# Patient Record
Sex: FEMALE | Race: ASIAN | NOT HISPANIC OR LATINO | URBAN - METROPOLITAN AREA
[De-identification: names, ages, dates, MRNs, and addresses within clinical notes are randomized per-mention and may not be internally consistent; named-entity substitution may affect disease eponyms.]

---

## 2019-06-01 ENCOUNTER — OUTPATIENT (OUTPATIENT)
Dept: OUTPATIENT SERVICES | Facility: HOSPITAL | Age: 44
LOS: 1 days | End: 2019-06-01
Payer: MEDICAID

## 2019-06-01 PROCEDURE — G9001: CPT

## 2019-06-06 DIAGNOSIS — Z71.89 OTHER SPECIFIED COUNSELING: ICD-10-CM

## 2019-12-26 PROBLEM — Z00.00 ENCOUNTER FOR PREVENTIVE HEALTH EXAMINATION: Status: ACTIVE | Noted: 2019-12-26

## 2020-01-10 ENCOUNTER — APPOINTMENT (OUTPATIENT)
Dept: NEUROLOGY | Facility: CLINIC | Age: 45
End: 2020-01-10
Payer: MEDICAID

## 2020-01-10 VITALS
WEIGHT: 135 LBS | HEART RATE: 75 BPM | BODY MASS INDEX: 23.92 KG/M2 | SYSTOLIC BLOOD PRESSURE: 100 MMHG | RESPIRATION RATE: 17 BRPM | OXYGEN SATURATION: 98 % | TEMPERATURE: 97.8 F | HEIGHT: 63 IN | DIASTOLIC BLOOD PRESSURE: 56 MMHG

## 2020-01-10 DIAGNOSIS — F20.9 SCHIZOPHRENIA, UNSPECIFIED: ICD-10-CM

## 2020-01-10 DIAGNOSIS — Z78.9 OTHER SPECIFIED HEALTH STATUS: ICD-10-CM

## 2020-01-10 PROCEDURE — 99204 OFFICE O/P NEW MOD 45 MIN: CPT

## 2020-01-10 NOTE — CONSULT LETTER
[Dear  ___] : Dear  [unfilled], [FreeTextEntry1] : \par \par Thank you very much for allowing me to participate in the care of your patient. Please don't hesitate to contact me with any questions or concerns. \par \par Sincerely,\par Marly Cano MD\par Neurology\par [facility]\par [address]\[place] [address]\par Tel: [phone]\par Email: [email or web address] \par

## 2020-01-10 NOTE — HISTORY OF PRESENT ILLNESS
[FreeTextEntry1] : 44 W who has been diagnosed with schizophrenia who had many admissions to psychiatric hospital and now has court mandated treatment. She only speak Mandarin Chinese. She came with a letter from Dr. canada but I was only able to get . Called brother  and parents  and not able to leave . She states she goes outside at night because she wants to go home to her parents. When asked why she is on so many medications, she states she doesn't know. She knows that her parents don't want her to come home right now.

## 2020-01-10 NOTE — PHYSICAL EXAM
[Person] : oriented to person [FreeTextEntry1] : child like behavior [General Appearance - Alert] : alert [Time] : disoriented to time [Place] : disoriented to place [Fluency] : fluency intact [Cranial Nerves Optic (II)] : visual acuity intact bilaterally,  visual fields full to confrontation, pupils equal round and reactive to light [Current Events] : adequate knowledge of current events [Cranial Nerves Trigeminal (V)] : facial sensation intact symmetrically [Cranial Nerves Oculomotor (III)] : extraocular motion intact [Cranial Nerves Facial (VII)] : face symmetrical [Cranial Nerves Vestibulocochlear (VIII)] : hearing was intact bilaterally [Cranial Nerves Glossopharyngeal (IX)] : tongue and palate midline [Cranial Nerves Hypoglossal (XII)] : there was no tongue deviation with protrusion [Cranial Nerves Accessory (XI - Cranial And Spinal)] : head turning and shoulder shrug symmetric [Motor Strength] : muscle strength was normal in all four extremities [Motor Tone] : muscle tone was normal in all four extremities [Sensation Tactile Decrease] : light touch was intact [Coordination - Dysmetria Impaired Finger-to-Nose Bilateral] : not present [2+] : Patella left 2+ [Hearing Threshold Finger Rub Not [place]] : hearing was normal [Extraocular Movements] : extraocular movements were intact [Abnormal Walk] : normal gait [] : no rash [Full Pulse] : the pedal pulses are present

## 2020-01-14 ENCOUNTER — EMERGENCY (EMERGENCY)
Facility: HOSPITAL | Age: 45
LOS: 1 days | Discharge: HOME | End: 2020-01-14
Attending: EMERGENCY MEDICINE | Admitting: EMERGENCY MEDICINE
Payer: MEDICAID

## 2020-01-14 VITALS
WEIGHT: 130.07 LBS | DIASTOLIC BLOOD PRESSURE: 78 MMHG | SYSTOLIC BLOOD PRESSURE: 111 MMHG | OXYGEN SATURATION: 95 % | HEIGHT: 64 IN | TEMPERATURE: 98 F | RESPIRATION RATE: 18 BRPM | HEART RATE: 88 BPM

## 2020-01-14 PROCEDURE — 99282 EMERGENCY DEPT VISIT SF MDM: CPT

## 2020-01-14 PROCEDURE — 99283 EMERGENCY DEPT VISIT LOW MDM: CPT

## 2020-01-14 NOTE — ED PROVIDER NOTE - ATTENDING CONTRIBUTION TO CARE
pt was sent from [facility] for evaluation. pt lives at the group home , has h/o schizophrenia, left group home and was out for 2 hrs.  pt denies any complaints. no si/hi/hallucinations. no fall /injury.     exam:   General: well appearing, NAD.   HEENT: eyes perrl, nose normal, OP no erythema/exudate/swelling.   cor: RRR, s1s2, 2+rad pulses.   lungs: ctabl, no resp distress.   abd: soft, ntnd.   neuro: a&ox3, cn2-12 intact, CHIRINOS, 5/5 strength c nl sensation all extremities, nl coordination.   MSK: no extremity swelling.  Skin: normal, no rash  psych: no si/hi/hallucinations.  coherent.    AP: for med eval after pt left group home. no acute psych condition. no signs of injury or trauma. pt can return to group home.
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
## 2020-01-14 NOTE — ED PROVIDER NOTE - PATIENT PORTAL LINK FT
You can access the FollowMyHealth Patient Portal offered by [facility] by registering at the following website: [email or web address]. By joining [facility]’s FollowMyHealth portal, you will also be able to view your health information using other applications (apps) compatible with our system.

## 2020-01-14 NOTE — ED ADULT NURSE NOTE - CHIEF COMPLAINT QUOTE
Pt BIB EMS after eloping from [facility] where she is a resident. Pt denies any complaints, pain, SI/HI.

## 2020-01-14 NOTE — ED ADULT NURSE NOTE - TEMPLATE LIST FOR HEAD TO TOE ASSESSMENT
Consent: The patient's consent was obtained including but not limited to risks of crusting, scabbing, blistering, scarring, darker or lighter pigmentary change, recurrence, incomplete removal and infection. Render Post-Care Instructions In Note?: no Medical Necessity Information: It is in your best interest to select a reason for this procedure from the list below. All of these items fulfill various CMS LCD requirements except the new and changing color options. Post-Care Instructions: I reviewed with the patient in detail post-care instructions. Patient is to wear sunprotection, and avoid picking at any of the treated lesions. Pt may apply Vaseline to crusted or scabbing areas. Pared With?: 15 blade Medical Necessity Clause: This procedure was medically necessary because the lesions that were treated were: Detail Level: Simple General Number Of Freeze-Thaw Cycles: 2 freeze-thaw cycles

## 2020-01-14 NOTE — ED ADULT TRIAGE NOTE - CHIEF COMPLAINT QUOTE
Pt BIB EMS after eloping from [facility] where she is a resident. Pt denies any complaints, pain, SI/HI. No

## 2020-01-14 NOTE — ED PROVIDER NOTE - PHYSICAL EXAMINATION
General:     NAD, well-nourished, well-appearing  Eyes: PERRL  Head:     NC/AT, missing teeth  Neck:     trachea midline  Lungs:     CTA b/l  CVS:     RRR  Abd:     +BS, s/nt/nd  Ext:   no deformities, feet covered in dirt   Neuro: Alert, nonfocal

## 2020-01-20 DIAGNOSIS — Z03.89 ENCOUNTER FOR OBSERVATION FOR OTHER SUSPECTED DISEASES AND CONDITIONS RULED OUT: ICD-10-CM

## 2020-01-24 ENCOUNTER — EMERGENCY (EMERGENCY)
Facility: HOSPITAL | Age: 45
LOS: 1 days | Discharge: HOME | End: 2020-01-24
Attending: EMERGENCY MEDICINE | Admitting: EMERGENCY MEDICINE
Payer: MEDICAID

## 2020-01-24 VITALS
RESPIRATION RATE: 17 BRPM | OXYGEN SATURATION: 96 % | SYSTOLIC BLOOD PRESSURE: 105 MMHG | DIASTOLIC BLOOD PRESSURE: 61 MMHG | WEIGHT: 130.07 LBS | TEMPERATURE: 98 F | HEART RATE: 108 BPM

## 2020-01-24 DIAGNOSIS — F79 UNSPECIFIED INTELLECTUAL DISABILITIES: ICD-10-CM

## 2020-01-24 DIAGNOSIS — F23 BRIEF PSYCHOTIC DISORDER: ICD-10-CM

## 2020-01-24 LAB
ALBUMIN SERPL ELPH-MCNC: 2.9 G/DL — LOW (ref 3.3–5)
ALP SERPL-CCNC: 55 U/L — SIGNIFICANT CHANGE UP (ref 40–120)
ALT FLD-CCNC: 14 U/L — SIGNIFICANT CHANGE UP (ref 10–45)
AMPHET UR-MCNC: NEGATIVE — SIGNIFICANT CHANGE UP
ANION GAP SERPL CALC-SCNC: 8 MMOL/L — SIGNIFICANT CHANGE UP (ref 5–17)
APAP SERPL-MCNC: <1 UG/ML — LOW (ref 10–30)
APPEARANCE UR: CLEAR — SIGNIFICANT CHANGE UP
AST SERPL-CCNC: 12 U/L — SIGNIFICANT CHANGE UP (ref 10–40)
BACTERIA # UR AUTO: ABNORMAL /HPF
BARBITURATES UR SCN-MCNC: NEGATIVE — SIGNIFICANT CHANGE UP
BASOPHILS # BLD AUTO: 0.03 K/UL — SIGNIFICANT CHANGE UP (ref 0–0.2)
BASOPHILS NFR BLD AUTO: 0.3 % — SIGNIFICANT CHANGE UP (ref 0–2)
BENZODIAZ UR-MCNC: NEGATIVE — SIGNIFICANT CHANGE UP
BILIRUB SERPL-MCNC: 0.2 MG/DL — SIGNIFICANT CHANGE UP (ref 0.2–1.2)
BILIRUB UR-MCNC: NEGATIVE — SIGNIFICANT CHANGE UP
BUN SERPL-MCNC: 16 MG/DL — SIGNIFICANT CHANGE UP (ref 7–23)
CALCIUM SERPL-MCNC: 8.4 MG/DL — SIGNIFICANT CHANGE UP (ref 8.4–10.5)
CHLORIDE SERPL-SCNC: 108 MMOL/L — SIGNIFICANT CHANGE UP (ref 96–108)
CO2 SERPL-SCNC: 28 MMOL/L — SIGNIFICANT CHANGE UP (ref 22–31)
COCAINE METAB.OTHER UR-MCNC: NEGATIVE — SIGNIFICANT CHANGE UP
COLOR SPEC: YELLOW — SIGNIFICANT CHANGE UP
COMMENT - URINE: SIGNIFICANT CHANGE UP
CREAT SERPL-MCNC: 0.73 MG/DL — SIGNIFICANT CHANGE UP (ref 0.5–1.3)
DIFF PNL FLD: NEGATIVE — SIGNIFICANT CHANGE UP
EOSINOPHIL # BLD AUTO: 0.07 K/UL — SIGNIFICANT CHANGE UP (ref 0–0.5)
EOSINOPHIL NFR BLD AUTO: 0.8 % — SIGNIFICANT CHANGE UP (ref 0–6)
EPI CELLS # UR: SIGNIFICANT CHANGE UP
ETHANOL SERPL-MCNC: <3 MG/DL — SIGNIFICANT CHANGE UP (ref 0–3)
GLUCOSE SERPL-MCNC: 100 MG/DL — HIGH (ref 70–99)
GLUCOSE UR QL: NEGATIVE — SIGNIFICANT CHANGE UP
HCT VFR BLD CALC: 35.8 % — SIGNIFICANT CHANGE UP (ref 34.5–45)
HGB BLD-MCNC: 11.2 G/DL — LOW (ref 11.5–15.5)
IMM GRANULOCYTES NFR BLD AUTO: 1.4 % — SIGNIFICANT CHANGE UP (ref 0–1.5)
KETONES UR-MCNC: NEGATIVE — SIGNIFICANT CHANGE UP
LEUKOCYTE ESTERASE UR-ACNC: ABNORMAL
LYMPHOCYTES # BLD AUTO: 1.85 K/UL — SIGNIFICANT CHANGE UP (ref 1–3.3)
LYMPHOCYTES # BLD AUTO: 20.5 % — SIGNIFICANT CHANGE UP (ref 13–44)
MCHC RBC-ENTMCNC: 30.9 PG — SIGNIFICANT CHANGE UP (ref 27–34)
MCHC RBC-ENTMCNC: 31.3 GM/DL — LOW (ref 32–36)
MCV RBC AUTO: 98.9 FL — SIGNIFICANT CHANGE UP (ref 80–100)
METHADONE UR-MCNC: NEGATIVE — SIGNIFICANT CHANGE UP
MONOCYTES # BLD AUTO: 1.28 K/UL — HIGH (ref 0–0.9)
MONOCYTES NFR BLD AUTO: 14.2 % — HIGH (ref 2–14)
NEUTROPHILS # BLD AUTO: 5.65 K/UL — SIGNIFICANT CHANGE UP (ref 1.8–7.4)
NEUTROPHILS NFR BLD AUTO: 62.8 % — SIGNIFICANT CHANGE UP (ref 43–77)
NITRITE UR-MCNC: NEGATIVE — SIGNIFICANT CHANGE UP
NRBC # BLD: 0 /100 WBCS — SIGNIFICANT CHANGE UP (ref 0–0)
OPIATES UR-MCNC: NEGATIVE — SIGNIFICANT CHANGE UP
PCP SPEC-MCNC: SIGNIFICANT CHANGE UP
PCP UR-MCNC: NEGATIVE — SIGNIFICANT CHANGE UP
PH UR: 8 — SIGNIFICANT CHANGE UP (ref 5–8)
PLATELET # BLD AUTO: 145 K/UL — LOW (ref 150–400)
POTASSIUM SERPL-MCNC: 4.3 MMOL/L — SIGNIFICANT CHANGE UP (ref 3.5–5.3)
POTASSIUM SERPL-SCNC: 4.3 MMOL/L — SIGNIFICANT CHANGE UP (ref 3.5–5.3)
PROT SERPL-MCNC: 6.4 G/DL — SIGNIFICANT CHANGE UP (ref 6–8.3)
PROT UR-MCNC: NEGATIVE — SIGNIFICANT CHANGE UP
RBC # BLD: 3.62 M/UL — LOW (ref 3.8–5.2)
RBC # FLD: 13.4 % — SIGNIFICANT CHANGE UP (ref 10.3–14.5)
RBC CASTS # UR COMP ASSIST: NEGATIVE /HPF — SIGNIFICANT CHANGE UP (ref 0–4)
SALICYLATES SERPL-MCNC: 0.6 MG/DL — LOW (ref 3–30)
SODIUM SERPL-SCNC: 144 MMOL/L — SIGNIFICANT CHANGE UP (ref 135–145)
SP GR SPEC: 1.01 — SIGNIFICANT CHANGE UP (ref 1.01–1.02)
THC UR QL: NEGATIVE — SIGNIFICANT CHANGE UP
UROBILINOGEN FLD QL: NEGATIVE — SIGNIFICANT CHANGE UP
WBC # BLD: 9.01 K/UL — SIGNIFICANT CHANGE UP (ref 3.8–10.5)
WBC # FLD AUTO: 9.01 K/UL — SIGNIFICANT CHANGE UP (ref 3.8–10.5)
WBC UR QL: SIGNIFICANT CHANGE UP /HPF (ref 0–5)

## 2020-01-24 PROCEDURE — 80053 COMPREHEN METABOLIC PANEL: CPT

## 2020-01-24 PROCEDURE — 36415 COLL VENOUS BLD VENIPUNCTURE: CPT

## 2020-01-24 PROCEDURE — 93005 ELECTROCARDIOGRAM TRACING: CPT

## 2020-01-24 PROCEDURE — 90792 PSYCH DIAG EVAL W/MED SRVCS: CPT | Mod: GT,GC

## 2020-01-24 PROCEDURE — 80164 ASSAY DIPROPYLACETIC ACD TOT: CPT

## 2020-01-24 PROCEDURE — 81025 URINE PREGNANCY TEST: CPT

## 2020-01-24 PROCEDURE — 70450 CT HEAD/BRAIN W/O DYE: CPT | Mod: 26

## 2020-01-24 PROCEDURE — 85027 COMPLETE CBC AUTOMATED: CPT

## 2020-01-24 PROCEDURE — 87086 URINE CULTURE/COLONY COUNT: CPT

## 2020-01-24 PROCEDURE — 99285 EMERGENCY DEPT VISIT HI MDM: CPT

## 2020-01-24 PROCEDURE — 80307 DRUG TEST PRSMV CHEM ANLYZR: CPT

## 2020-01-24 PROCEDURE — 81001 URINALYSIS AUTO W/SCOPE: CPT

## 2020-01-24 PROCEDURE — 70450 CT HEAD/BRAIN W/O DYE: CPT

## 2020-01-24 PROCEDURE — 93010 ELECTROCARDIOGRAM REPORT: CPT

## 2020-01-24 RX ORDER — CEFUROXIME AXETIL 250 MG
500 TABLET ORAL ONCE
Refills: 0 | Status: COMPLETED | OUTPATIENT
Start: 2020-01-24 | End: 2020-01-24

## 2020-01-24 RX ADMIN — Medication 500 MILLIGRAM(S): at 22:54

## 2020-01-24 NOTE — ED BEHAVIORAL HEALTH ASSESSMENT NOTE - NS ED BHA MED ROS RESPIRATORY
How Severe Are They?: mild
Is This A New Presentation, Or A Follow-Up?: Follow Up [facility]
No complaints

## 2020-01-24 NOTE — ED BEHAVIORAL HEALTH ASSESSMENT NOTE - RISK ASSESSMENT
Chronically, patient with elevated suicide risk including psychotic disorder, non compliance with medication, poor social support, and prior possible attempts. Acutely, patient is at low risk of self harm as she denies current suicidal ideation, intent or plan. Low Acute Suicide Risk
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
## 2020-01-24 NOTE — ED BEHAVIORAL HEALTH ASSESSMENT NOTE - OTHER PAST PSYCHIATRIC HISTORY (INCLUDE DETAILS REGARDING ONSET, COURSE OF ILLNESS, INPATIENT/OUTPATIENT TREATMENT)
Hx of multiple prior inpatient admissions and possible hx of prior suicide attempts per collateral. Currently seen by ACT team and living in CR at [facility].

## 2020-01-24 NOTE — ED BEHAVIORAL HEALTH NOTE - BEHAVIORAL HEALTH NOTE
===================  PRE-HOSPITAL COURSE  ===================  SOURCE:  BIB EMS, no runsheet available   DETAILS:   Per RN - EMS reportedly found pt. roaming parking lot behind school nearby her group home/facility     ============  ED COURSE   ============  SOURCE:  Primary RN Nickie   ARRIVAL:  EMS, unaccompanied   BELONGINGS:  clothing/coat   BEHAVIOR: pt. hygiene/grooming fair, calm/cooperative, provided blood and urine willingly, has eaten and drank in ED, flat affect reported   TREATMENT:  no medications or restraints needed   VISITORS:  no visitors observed

## 2020-01-24 NOTE — ED BEHAVIORAL HEALTH ASSESSMENT NOTE - MEDICAL ISSUES AND PLAN (INCLUDE STANDING AND PRN MEDICATION)
Patient started on Ceftin in ED for UTI and given 500mg. Will continue at 250mg BID for 6 days. Can restart patient on omeprazole 20mg for GERD.

## 2020-01-24 NOTE — ED ADULT TRIAGE NOTE - CHIEF COMPLAINT QUOTE
pt presents to ED with EMS. Pt was found in a parking lot behind a school in [place] walking around for 2 hours. The pt is chinese speaking.  was used and as per , the pt is repeating the same thing over and over. pt presents to ED with EMS. Pt was found in a parking lot behind a school in [place] walking around for 2 hours. The pt is mandarin speaking.  was used and as per , the pt is repeating the same thing over and over.

## 2020-01-24 NOTE — ED PROVIDER NOTE - PHYSICAL EXAMINATION
General:     NAD  Eyes: PERRL  Head:     NC/AT, EOMI, oral mucosa moist  Neck:     trachea midline  Lungs:     CTA b/l  CVS:     RRR  Abd:     +BS, s/nt/nd  Ext:   no deformities   Neuro: knows name and . unsure where she is

## 2020-01-24 NOTE — ED BEHAVIORAL HEALTH ASSESSMENT NOTE - OTHER
ACT Team Personnel Sarah Carrion [phone] Police other residents at the CR None "mad" patient is not a native English speaker.

## 2020-01-24 NOTE — ED PROVIDER NOTE - CLINICAL SUMMARY MEDICAL DECISION MAKING FREE TEXT BOX
pt 58 yo f hx schizophrenia from [facility] presents to ED with EMS. Pt was found in a parking lot behind a school in [place] walking around for 2 hours. The pt is mandarin speaking.  was used and as per , the pt is repeating the same thing over and over.  pt came in as noname and recognized by provider. Called pts housing facility and spoke to Ryne who stated pt was supposed to be supervised 24 hours and they were not aware she was missing. [phone]  pt resides at [address] in [place]- now called nadira [facility].   spoke to Sarah from ACT team [phone] and pt is court mandated to take her medications. has been refusing medications, hearing voices, past hx suicidal ideations. pts family is not involved in her care  seroquel 100mg, depakote 1000, invegra 250mg   used mandarin  raleigh [id number]- pt knew name and  but would not speak or answer any more questions  awaiting telepsych evaluation

## 2020-01-24 NOTE — ED ADULT NURSE NOTE - CHIEF COMPLAINT QUOTE
pt presents to ED with EMS. Pt was found in a parking lot behind a school in [place] walking around for 2 hours. The pt is mandarin speaking.  was used and as per , the pt is repeating the same thing over and over.

## 2020-01-24 NOTE — ED BEHAVIORAL HEALTH ASSESSMENT NOTE - ACTIVATING EVENTS/STRESSORS
Current or pending social isolation/Perceived burden on family or others/Inadequate social supports/Non-compliant or not receiving treatment

## 2020-01-24 NOTE — ED PROVIDER NOTE - OBJECTIVE STATEMENT
pt 58 yo f hx schizophrenia from [facility] presents to ED with EMS. Pt was found in a parking lot behind a school in [place] walking around for 2 hours. The pt is mandarin speaking.  was used and as per , the pt is repeating the same thing over and over.  pt came in as noname and recognized by provider. Called pts housing facility and spoke to Ryne who stated pt was supossed to be supervised 24 hours and they were not aware she was missing. [phone] pt 56 yo f hx schizophrenia from [facility] presents to ED with EMS. Pt was found in a parking lot behind a school in [place] walking around for 2 hours. The pt is mandarin speaking.  was used and as per , the pt is repeating the same thing over and over.  pt came in as noname and recognized by provider. Called pts housing facility and spoke to Ryne who stated pt was supposed to be supervised 24 hours and they were not aware she was missing. [phone]  pt resides at [address] [place] [address] in [place]- now called nadira [facility].   spoke to Sarah from ACT team [phone] and pt is court mandated to take her medications. has been refusing medications, hearing voices, past hx suicidal ideations. pts family is not involved in her care  seroquel 100mg, depakote 1000, invegra 250mg   used mandarin  raleigh [id number]- pt knew name and  but would not speak or answer any more questions

## 2020-01-24 NOTE — ED PROVIDER NOTE - PROGRESS NOTE DETAILS
dr Mojica:  pt is medically cleared for inpatient psych.  UA + for UTI. pt otherwise asymptomatic. given ceftin 500mg in ED. recommend ceftin 500mg PO BID for 5 days. pt evaluated by telepsych, who recommends 2PC,psych admission

## 2020-01-24 NOTE — ED BEHAVIORAL HEALTH ASSESSMENT NOTE - VIOLENCE RISK FACTORS:
Noncompliance with treatment/Irritability/Community stressors that increase the risk of destabilization

## 2020-01-24 NOTE — ED BEHAVIORAL HEALTH ASSESSMENT NOTE - HPI (INCLUDE ILLNESS QUALITY, SEVERITY, DURATION, TIMING, CONTEXT, MODIFYING FACTORS, ASSOCIATED SIGNS AND SYMPTOMS)
45 year old Chinese woman, Mandarin speaking only currently domiciled at [facility] with PMH of allergic rhinitis, vitamin D deficiency, GERD per psyches and past psychiatric hx of schizophrenia, non compliant with outpatient care x 1 day, BIB police after being found loitering around local school. On interview and exam, patient is alert and oriented to person only. She does not know the month (answers December, but does know that it is winter) or year (thinks it is 1974) and says that her father brought her into the hospital. She denies issues with sleep or appetite. She notes low energy and is unsure of how her concentration has been. She denies currently suicidal ideation, intent or plan and she denies current homicidal ideation, intent or plan. She notes that she does her voices and when asked what they say to her, she responds in unintelligible language. She denies anxiety or assault. She does not know what meds she takes and she does not know what allergies she has. She denies tobacco, alcohol or drug use but when asked about sexual activity, she refuses to respond.  She says that she has no friends, spends all of her day in the house, doesn't enjoy music or television and doesn't like to have fun. She says that her mood is "mad" for "no reason." Today, she says that she took medication and she is asking for someone to get her brother.     Per Sarah Carrion, ACT team personnel, patient's symptoms have been worsening over the past month with unknown provocation. She has been leaving the house, acting erratically, has not been completing ADLs, and does not know her address which are all changes from baseline. Patient moved to the  during the summer of 2019 after being abandoned by her parents and brothers, who [place] between the [place] and [place]. Per Sarah, patient does have a possible hx of suicide attempts and will have trantrums when she becomes upset. To Sarah's knowledge, patient is not on OCPs, and may have the cognitive level of a young child. Patient has several prior admissions to inpatient psychiatry. She occasionally has followed with a Mandarin speaking psychiatrist at [facility] where she has had prior inpatient admissions. Patient has her own power of  and financially managed by the ACT team, receiving SSI and SSD. Trauma hx is unknown. Per Sarah, patient receives monthly Invega IM (last injection the beginning of January 2020), VPA 1000mg QAM (refused today).    Per chart review, patient presented to same ED one week prior on 1/14/20 after being found loitering outside. At that time, patient was found to be hypothermic and was treated and discharged back to CR.

## 2020-01-24 NOTE — ED BEHAVIORAL HEALTH ASSESSMENT NOTE - CASE SUMMARY
Patient is a 45 year old Chinese woman currently domiciled at a  with a past psychiatric history significant for schizophrenia, currently followed by ACT team, who presents to the ED in a decompensated psychotic state. Based on presentation patient needs inpatient admission for psychiatric stabilization and medication optimization.

## 2020-01-24 NOTE — ED BEHAVIORAL HEALTH ASSESSMENT NOTE - SUMMARY
45 year old Chinese woman, Mandarin speaking only currently domiciled at [facility] with PMH of allergic rhinitis, vitamin D deficiency, GERD per psyches and past psychiatric hx of schizophrenia, non compliant with outpatient care x 1 day, BIB police after being found loitering around local school. Patient with similar presentation to ED 1 week prior and was discharged back to . Per ACT team, patient has progressively deteriorated over the past month and has been non compliant with medication over the past day. Patient with unknown trauma hx, history of possible suicide attempts, and numerous prior admissions. She denies suicidal ideation, intent or plan or homicidal ideation, intent or plan but does endorse AH. On exam, patient with very poor dentition, tachycardia, blunted affected and irritable mood.   leuks on UA and patient started on abx in the ED. Given known psychiatric hx and subacute decline, plan to admit to inpatient for higher level of care.

## 2020-01-24 NOTE — ED BEHAVIORAL HEALTH ASSESSMENT NOTE - DESCRIPTION
Presented to ED and found to have tachycardia on initial vitals. Tolerated EKG, CBC, BMP, CT Brain and was calm and cooperative. Found to have bacteria and moderate leuks on UA and started on 500mg dose of ceftin. CT Brain with no acute concern but found to have advanced cerebral volume loss. Per psyches, patient with GERD, allergic rhinitis, vitamin D deficiency Currently lives in [place]; abandoned by family

## 2020-01-24 NOTE — ED BEHAVIORAL HEALTH ASSESSMENT NOTE - NS ED MD SCRIBE BH ASMENT SECTIONS
MEDICATION/MEDICAL REVIEW OF SYSTEMS/FORMULATION/SUICIDALITY RISK ASSESSMENT/HOMICIDALITY / AGGRESSION/PLAN/TELEPSYCHIATRY/DEMOGRAPHICS/BACKGROUND INFORMATION/HPI/SUBSTANCE USE/REVIEW OF ED CHART/ED COURSE/OTHER PAST PSYCHIATRY HISTORY/PAST MEDICAL HISTORY/FAMILY HISTORY/SOCIAL HISTORY/MENTAL STATUS EXAM/AXIS

## 2020-01-24 NOTE — ED BEHAVIORAL HEALTH ASSESSMENT NOTE - DETAILS
Limited capacity Per collateral, patient with possible prior suicide attempts. Currently, denies suicidal ideation, intent or plan. Plan discussed with ED attending. ED attending to complete legals. Will discuss case with KALIE

## 2020-01-24 NOTE — ED PROVIDER NOTE - ATTENDING CONTRIBUTION TO CARE
Kristopher with [facility]. pt 58 yo f hx schizophrenia from [facility] presents to ED with EMS. Pt was found in a parking lot behind a school in [place] walking around for 2 hours. The pt is mandarin speaking.  was used and as per , the pt is repeating the same thing over and over.  pt came in as noname and recognized by provider. Called pts housing facility and spoke to Ryne who stated pt was supposed to be supervised 24 hours and they were not aware she was missing. [phone]  pt resides at [address] in [place]- now called nadira [facility].   spoke to Sarah from ACT team [phone] and pt is court mandated to take her medications. has been refusing medications, hearing voices, past hx suicidal ideations. pts family is not involved in her care  seroquel 100mg, depakote 1000, invegra 250mg   used mandarin  raleigh [id number]- pt knew name and  but would not speak or answer any more questions  awaiting telepsych evaluation  Patient signed out to incoming physician.  All decisions regarding the progression of care will be made at their discretion.   I performed a face to face bedside interview with patient regarding history of present illness, review of symptoms and past medical history. I completed an independent physical exam.  I have discussed the patient's plan of care with Physician Assistant (PA). I agree with note as stated above, having amended the EMR as needed to reflect my findings.   This includes History of Present Illness, HIV, Past Medical/Surgical/Family/Social History, Allergies and Home Medications, Review of Systems, Physical Exam, and any Progress Notes during the time I functioned as the attending physician for this patient.

## 2020-01-25 ENCOUNTER — INPATIENT (INPATIENT)
Facility: HOSPITAL | Age: 45
LOS: 26 days | Discharge: HOME | End: 2020-02-21
Attending: PSYCHIATRY & NEUROLOGY | Admitting: PSYCHIATRY & NEUROLOGY
Payer: MEDICAID

## 2020-01-25 VITALS — HEIGHT: 60 IN | WEIGHT: 141.1 LBS | TEMPERATURE: 97 F

## 2020-01-25 VITALS
SYSTOLIC BLOOD PRESSURE: 111 MMHG | TEMPERATURE: 98 F | RESPIRATION RATE: 16 BRPM | DIASTOLIC BLOOD PRESSURE: 75 MMHG | OXYGEN SATURATION: 98 % | HEART RATE: 88 BPM

## 2020-01-25 DIAGNOSIS — F23 BRIEF PSYCHOTIC DISORDER: ICD-10-CM

## 2020-01-25 PROBLEM — F25.9 SCHIZOAFFECTIVE DISORDER, UNSPECIFIED: Chronic | Status: ACTIVE | Noted: 2020-01-14

## 2020-01-25 PROCEDURE — 99222 1ST HOSP IP/OBS MODERATE 55: CPT

## 2020-01-25 RX ORDER — DIPHENHYDRAMINE HCL 50 MG
50 CAPSULE ORAL EVERY 6 HOURS
Refills: 0 | Status: DISCONTINUED | OUTPATIENT
Start: 2020-01-25 | End: 2020-02-21

## 2020-01-25 RX ORDER — CEFUROXIME AXETIL 250 MG
500 TABLET ORAL EVERY 12 HOURS
Refills: 0 | Status: DISCONTINUED | OUTPATIENT
Start: 2020-01-25 | End: 2020-01-27

## 2020-01-25 RX ORDER — HALOPERIDOL DECANOATE 100 MG/ML
5 INJECTION INTRAMUSCULAR EVERY 6 HOURS
Refills: 0 | Status: DISCONTINUED | OUTPATIENT
Start: 2020-01-25 | End: 2020-02-21

## 2020-01-25 RX ORDER — DIPHENHYDRAMINE HCL 50 MG
50 CAPSULE ORAL ONCE
Refills: 0 | Status: DISCONTINUED | OUTPATIENT
Start: 2020-01-25 | End: 2020-02-21

## 2020-01-25 RX ORDER — DIVALPROEX SODIUM 500 MG/1
1000 TABLET, DELAYED RELEASE ORAL AT BEDTIME
Refills: 0 | Status: DISCONTINUED | OUTPATIENT
Start: 2020-01-25 | End: 2020-02-21

## 2020-01-25 RX ORDER — HALOPERIDOL DECANOATE 100 MG/ML
5 INJECTION INTRAMUSCULAR ONCE
Refills: 0 | Status: DISCONTINUED | OUTPATIENT
Start: 2020-01-25 | End: 2020-02-21

## 2020-01-25 RX ORDER — PANTOPRAZOLE SODIUM 20 MG/1
40 TABLET, DELAYED RELEASE ORAL
Refills: 0 | Status: DISCONTINUED | OUTPATIENT
Start: 2020-01-25 | End: 2020-02-21

## 2020-01-25 RX ADMIN — HALOPERIDOL DECANOATE 5 MILLIGRAM(S): 100 INJECTION INTRAMUSCULAR at 11:00

## 2020-01-25 RX ADMIN — Medication 50 MILLIGRAM(S): at 11:00

## 2020-01-25 RX ADMIN — Medication 2 MILLIGRAM(S): at 11:00

## 2020-01-25 RX ADMIN — Medication 500 MILLIGRAM(S): at 21:46

## 2020-01-25 RX ADMIN — DIVALPROEX SODIUM 1000 MILLIGRAM(S): 500 TABLET, DELAYED RELEASE ORAL at 21:46

## 2020-01-25 RX ADMIN — PANTOPRAZOLE SODIUM 40 MILLIGRAM(S): 20 TABLET, DELAYED RELEASE ORAL at 08:27

## 2020-01-25 RX ADMIN — Medication 500 MILLIGRAM(S): at 09:05

## 2020-01-26 LAB
CULTURE RESULTS: SIGNIFICANT CHANGE UP
SPECIMEN SOURCE: SIGNIFICANT CHANGE UP
VALPROATE SERPL-MCNC: 81 UG/ML — SIGNIFICANT CHANGE UP (ref 50–100)

## 2020-01-26 PROCEDURE — 99232 SBSQ HOSP IP/OBS MODERATE 35: CPT

## 2020-01-26 RX ADMIN — Medication 500 MILLIGRAM(S): at 20:56

## 2020-01-26 RX ADMIN — Medication 500 MILLIGRAM(S): at 09:09

## 2020-01-26 RX ADMIN — DIVALPROEX SODIUM 1000 MILLIGRAM(S): 500 TABLET, DELAYED RELEASE ORAL at 20:56

## 2020-01-26 RX ADMIN — PANTOPRAZOLE SODIUM 40 MILLIGRAM(S): 20 TABLET, DELAYED RELEASE ORAL at 09:09

## 2020-01-26 NOTE — CHART NOTE - NSCHARTNOTEFT_GEN_A_CORE
Screening Medical Evaluation  Patient Admitted from: [place] ED    [facility] admitting diagnosis: Brief psychotic disorder    PAST MEDICAL & SURGICAL HISTORY:  Schizoaffective disorder        Allergies    No Known Allergies    Intolerances        Social History:     FAMILY HISTORY:      MEDICATIONS  (STANDING):  cefuroxime   Tablet 500 milliGRAM(s) Oral every 12 hours  diVALproex ER 1000 milliGRAM(s) Oral at bedtime  pantoprazole    Tablet 40 milliGRAM(s) Oral before breakfast    MEDICATIONS  (PRN):  diphenhydrAMINE 50 milliGRAM(s) Oral every 6 hours PRN agitation  diphenhydrAMINE   Injectable 50 milliGRAM(s) IntraMuscular once PRN Assaultive behavior  haloperidol     Tablet 5 milliGRAM(s) Oral every 6 hours PRN agitation  haloperidol    Injectable 5 milliGRAM(s) IntraMuscular once PRN agitation  LORazepam     Tablet 2 milliGRAM(s) Oral every 6 hours PRN anxiety/agitation  LORazepam   Injectable 2 milliGRAM(s) IntraMuscular once PRN Agitation      Vital Signs Last 24 Hrs  T(C): 36.9 (25 Jan 2020 19:52), Max: 36.9 (25 Jan 2020 19:52)  T(F): 98.4 (25 Jan 2020 19:52), Max: 98.4 (25 Jan 2020 19:52)  HR: 94  BP: 119/76  BP(mean): --  RR: 18  SpO2: 100  CAPILLARY BLOOD GLUCOSE            PHYSICAL EXAM:  GENERAL: NAD, well-developed  HEAD:  Atraumatic, Normocephalic  EYES: EOMI, PERRLA, conjunctiva and sclera clear  NECK: Supple.  CHEST/LUNG: Clear to auscultation bilaterally; No wheeze  HEART: Regular rate and rhythm; No murmurs, rubs, or gallops  ABDOMEN: Soft, Nontender, Nondistended; Bowel sounds present  EXTREMITIES:  2+ Peripheral Pulses, No cyanosis, or edema  PSYCH: AAOx3  NEUROLOGY: non-focal  SKIN: No rashes or lesions    LABS:                    RADIOLOGY & ADDITIONAL TESTS:    Assessment and Plan:  45 year old female Mandarin speaking presenting today from [place] ED to [facility] with admitting diagnosis of  Brief psychotic disorder with PMH of GERD. +Leuks on UA and patient started on abx in the ED. Continue ceftin 500mg BID. With help of phone  Denies  any fever, chills, headache, pain, chest pain, SOB, abdominal pain, N/V/D/C. Physical exam unremarkable.  1)  Brief psychotic disorder : Follow care plan as per primary team.  2) UTI: Continue ceftin 500mg BID x 5days.  3) GERD: Continue protonix 40mg oral daily.

## 2020-01-27 PROCEDURE — 99232 SBSQ HOSP IP/OBS MODERATE 35: CPT

## 2020-01-27 RX ORDER — CEFUROXIME AXETIL 250 MG
500 TABLET ORAL EVERY 12 HOURS
Refills: 0 | Status: COMPLETED | OUTPATIENT
Start: 2020-01-27 | End: 2020-01-30

## 2020-01-27 RX ORDER — QUETIAPINE FUMARATE 200 MG/1
50 TABLET, FILM COATED ORAL AT BEDTIME
Refills: 0 | Status: DISCONTINUED | OUTPATIENT
Start: 2020-01-27 | End: 2020-01-29

## 2020-01-27 RX ADMIN — PANTOPRAZOLE SODIUM 40 MILLIGRAM(S): 20 TABLET, DELAYED RELEASE ORAL at 09:35

## 2020-01-27 RX ADMIN — QUETIAPINE FUMARATE 50 MILLIGRAM(S): 200 TABLET, FILM COATED ORAL at 20:47

## 2020-01-27 RX ADMIN — Medication 500 MILLIGRAM(S): at 20:47

## 2020-01-27 RX ADMIN — Medication 500 MILLIGRAM(S): at 09:35

## 2020-01-27 RX ADMIN — DIVALPROEX SODIUM 1000 MILLIGRAM(S): 500 TABLET, DELAYED RELEASE ORAL at 20:47

## 2020-01-28 PROCEDURE — 99232 SBSQ HOSP IP/OBS MODERATE 35: CPT

## 2020-01-28 RX ADMIN — Medication 500 MILLIGRAM(S): at 20:32

## 2020-01-28 RX ADMIN — DIVALPROEX SODIUM 1000 MILLIGRAM(S): 500 TABLET, DELAYED RELEASE ORAL at 20:32

## 2020-01-28 RX ADMIN — QUETIAPINE FUMARATE 50 MILLIGRAM(S): 200 TABLET, FILM COATED ORAL at 20:32

## 2020-01-28 RX ADMIN — PANTOPRAZOLE SODIUM 40 MILLIGRAM(S): 20 TABLET, DELAYED RELEASE ORAL at 09:09

## 2020-01-28 RX ADMIN — Medication 500 MILLIGRAM(S): at 09:09

## 2020-01-29 LAB
CHOLEST SERPL-MCNC: 151 MG/DL — SIGNIFICANT CHANGE UP (ref 120–199)
HBA1C BLD-MCNC: 5.2 % — SIGNIFICANT CHANGE UP (ref 4–5.6)
HDLC SERPL-MCNC: 46 MG/DL — SIGNIFICANT CHANGE UP (ref 45–65)
LIPID PNL WITH DIRECT LDL SERPL: 87 MG/DL — SIGNIFICANT CHANGE UP
TRIGL SERPL-MCNC: 87 MG/DL — SIGNIFICANT CHANGE UP (ref 10–149)
TSH SERPL-MCNC: 1.68 UIU/ML — SIGNIFICANT CHANGE UP (ref 0.27–4.2)

## 2020-01-29 PROCEDURE — 99231 SBSQ HOSP IP/OBS SF/LOW 25: CPT

## 2020-01-29 RX ORDER — ARIPIPRAZOLE 15 MG/1
5 TABLET ORAL DAILY
Refills: 0 | Status: DISCONTINUED | OUTPATIENT
Start: 2020-01-29 | End: 2020-01-30

## 2020-01-29 RX ADMIN — Medication 500 MILLIGRAM(S): at 08:04

## 2020-01-29 RX ADMIN — PANTOPRAZOLE SODIUM 40 MILLIGRAM(S): 20 TABLET, DELAYED RELEASE ORAL at 08:04

## 2020-01-29 RX ADMIN — DIVALPROEX SODIUM 1000 MILLIGRAM(S): 500 TABLET, DELAYED RELEASE ORAL at 20:51

## 2020-01-29 RX ADMIN — Medication 500 MILLIGRAM(S): at 20:51

## 2020-01-30 DIAGNOSIS — R41.0 DISORIENTATION, UNSPECIFIED: ICD-10-CM

## 2020-01-30 PROCEDURE — 99231 SBSQ HOSP IP/OBS SF/LOW 25: CPT

## 2020-01-30 RX ORDER — OLANZAPINE 15 MG/1
5 TABLET, FILM COATED ORAL AT BEDTIME
Refills: 0 | Status: DISCONTINUED | OUTPATIENT
Start: 2020-01-30 | End: 2020-02-03

## 2020-01-30 RX ADMIN — DIVALPROEX SODIUM 1000 MILLIGRAM(S): 500 TABLET, DELAYED RELEASE ORAL at 20:38

## 2020-01-30 RX ADMIN — Medication 2 MILLIGRAM(S): at 00:31

## 2020-01-30 RX ADMIN — ARIPIPRAZOLE 5 MILLIGRAM(S): 15 TABLET ORAL at 08:39

## 2020-01-30 RX ADMIN — Medication 500 MILLIGRAM(S): at 08:39

## 2020-01-30 RX ADMIN — OLANZAPINE 5 MILLIGRAM(S): 15 TABLET, FILM COATED ORAL at 20:38

## 2020-01-30 RX ADMIN — Medication 2 MILLIGRAM(S): at 23:22

## 2020-01-30 RX ADMIN — PANTOPRAZOLE SODIUM 40 MILLIGRAM(S): 20 TABLET, DELAYED RELEASE ORAL at 08:39

## 2020-01-31 PROCEDURE — 99232 SBSQ HOSP IP/OBS MODERATE 35: CPT

## 2020-01-31 RX ORDER — CLONAZEPAM 1 MG
0.5 TABLET ORAL AT BEDTIME
Refills: 0 | Status: DISCONTINUED | OUTPATIENT
Start: 2020-01-31 | End: 2020-02-04

## 2020-01-31 RX ADMIN — Medication 0.5 MILLIGRAM(S): at 21:50

## 2020-01-31 RX ADMIN — DIVALPROEX SODIUM 1000 MILLIGRAM(S): 500 TABLET, DELAYED RELEASE ORAL at 21:50

## 2020-01-31 RX ADMIN — Medication 50 MILLIGRAM(S): at 21:50

## 2020-01-31 RX ADMIN — OLANZAPINE 5 MILLIGRAM(S): 15 TABLET, FILM COATED ORAL at 21:50

## 2020-01-31 RX ADMIN — PANTOPRAZOLE SODIUM 40 MILLIGRAM(S): 20 TABLET, DELAYED RELEASE ORAL at 08:51

## 2020-01-31 RX ADMIN — Medication 2 MILLIGRAM(S): at 21:50

## 2020-01-31 RX ADMIN — HALOPERIDOL DECANOATE 5 MILLIGRAM(S): 100 INJECTION INTRAMUSCULAR at 21:50

## 2020-02-01 ENCOUNTER — OUTPATIENT (OUTPATIENT)
Dept: OUTPATIENT SERVICES | Facility: HOSPITAL | Age: 45
LOS: 1 days | End: 2020-02-01

## 2020-02-01 RX ADMIN — PANTOPRAZOLE SODIUM 40 MILLIGRAM(S): 20 TABLET, DELAYED RELEASE ORAL at 09:42

## 2020-02-01 RX ADMIN — OLANZAPINE 5 MILLIGRAM(S): 15 TABLET, FILM COATED ORAL at 20:06

## 2020-02-01 RX ADMIN — Medication 0.5 MILLIGRAM(S): at 20:06

## 2020-02-01 RX ADMIN — DIVALPROEX SODIUM 1000 MILLIGRAM(S): 500 TABLET, DELAYED RELEASE ORAL at 20:06

## 2020-02-02 RX ADMIN — Medication 50 MILLIGRAM(S): at 14:41

## 2020-02-02 RX ADMIN — DIVALPROEX SODIUM 1000 MILLIGRAM(S): 500 TABLET, DELAYED RELEASE ORAL at 20:22

## 2020-02-02 RX ADMIN — HALOPERIDOL DECANOATE 5 MILLIGRAM(S): 100 INJECTION INTRAMUSCULAR at 14:41

## 2020-02-02 RX ADMIN — OLANZAPINE 5 MILLIGRAM(S): 15 TABLET, FILM COATED ORAL at 20:22

## 2020-02-02 RX ADMIN — PANTOPRAZOLE SODIUM 40 MILLIGRAM(S): 20 TABLET, DELAYED RELEASE ORAL at 08:46

## 2020-02-02 RX ADMIN — Medication 0.5 MILLIGRAM(S): at 20:22

## 2020-02-03 PROCEDURE — 99231 SBSQ HOSP IP/OBS SF/LOW 25: CPT

## 2020-02-03 RX ORDER — OLANZAPINE 15 MG/1
10 TABLET, FILM COATED ORAL AT BEDTIME
Refills: 0 | Status: DISCONTINUED | OUTPATIENT
Start: 2020-02-03 | End: 2020-02-07

## 2020-02-03 RX ADMIN — Medication 0.5 MILLIGRAM(S): at 20:40

## 2020-02-03 RX ADMIN — PANTOPRAZOLE SODIUM 40 MILLIGRAM(S): 20 TABLET, DELAYED RELEASE ORAL at 08:51

## 2020-02-03 RX ADMIN — HALOPERIDOL DECANOATE 5 MILLIGRAM(S): 100 INJECTION INTRAMUSCULAR at 13:40

## 2020-02-03 RX ADMIN — DIVALPROEX SODIUM 1000 MILLIGRAM(S): 500 TABLET, DELAYED RELEASE ORAL at 20:40

## 2020-02-03 RX ADMIN — Medication 50 MILLIGRAM(S): at 13:40

## 2020-02-03 RX ADMIN — OLANZAPINE 10 MILLIGRAM(S): 15 TABLET, FILM COATED ORAL at 20:40

## 2020-02-04 PROCEDURE — 99232 SBSQ HOSP IP/OBS MODERATE 35: CPT

## 2020-02-04 RX ORDER — CLONAZEPAM 1 MG
0.5 TABLET ORAL AT BEDTIME
Refills: 0 | Status: DISCONTINUED | OUTPATIENT
Start: 2020-02-04 | End: 2020-02-11

## 2020-02-04 RX ADMIN — DIVALPROEX SODIUM 1000 MILLIGRAM(S): 500 TABLET, DELAYED RELEASE ORAL at 20:42

## 2020-02-04 RX ADMIN — PANTOPRAZOLE SODIUM 40 MILLIGRAM(S): 20 TABLET, DELAYED RELEASE ORAL at 08:04

## 2020-02-04 RX ADMIN — OLANZAPINE 10 MILLIGRAM(S): 15 TABLET, FILM COATED ORAL at 20:42

## 2020-02-04 RX ADMIN — Medication 0.5 MILLIGRAM(S): at 20:04

## 2020-02-05 PROCEDURE — 99232 SBSQ HOSP IP/OBS MODERATE 35: CPT

## 2020-02-05 RX ADMIN — PANTOPRAZOLE SODIUM 40 MILLIGRAM(S): 20 TABLET, DELAYED RELEASE ORAL at 07:38

## 2020-02-05 RX ADMIN — OLANZAPINE 10 MILLIGRAM(S): 15 TABLET, FILM COATED ORAL at 20:27

## 2020-02-05 RX ADMIN — Medication 0.5 MILLIGRAM(S): at 20:00

## 2020-02-05 RX ADMIN — DIVALPROEX SODIUM 1000 MILLIGRAM(S): 500 TABLET, DELAYED RELEASE ORAL at 20:27

## 2020-02-06 LAB — VALPROATE SERPL-MCNC: 75.2 UG/ML — SIGNIFICANT CHANGE UP (ref 50–100)

## 2020-02-06 PROCEDURE — 99232 SBSQ HOSP IP/OBS MODERATE 35: CPT

## 2020-02-06 RX ADMIN — DIVALPROEX SODIUM 1000 MILLIGRAM(S): 500 TABLET, DELAYED RELEASE ORAL at 20:57

## 2020-02-06 RX ADMIN — PANTOPRAZOLE SODIUM 40 MILLIGRAM(S): 20 TABLET, DELAYED RELEASE ORAL at 07:59

## 2020-02-06 RX ADMIN — OLANZAPINE 10 MILLIGRAM(S): 15 TABLET, FILM COATED ORAL at 20:57

## 2020-02-06 RX ADMIN — Medication 0.5 MILLIGRAM(S): at 20:57

## 2020-02-07 DIAGNOSIS — Z71.89 OTHER SPECIFIED COUNSELING: ICD-10-CM

## 2020-02-07 PROCEDURE — 99231 SBSQ HOSP IP/OBS SF/LOW 25: CPT

## 2020-02-07 RX ORDER — OLANZAPINE 15 MG/1
10 TABLET, FILM COATED ORAL AT BEDTIME
Refills: 0 | Status: COMPLETED | OUTPATIENT
Start: 2020-02-07 | End: 2020-02-07

## 2020-02-07 RX ORDER — OLANZAPINE 15 MG/1
12.5 TABLET, FILM COATED ORAL AT BEDTIME
Refills: 0 | Status: DISCONTINUED | OUTPATIENT
Start: 2020-02-08 | End: 2020-02-14

## 2020-02-07 RX ADMIN — PANTOPRAZOLE SODIUM 40 MILLIGRAM(S): 20 TABLET, DELAYED RELEASE ORAL at 09:02

## 2020-02-07 RX ADMIN — DIVALPROEX SODIUM 1000 MILLIGRAM(S): 500 TABLET, DELAYED RELEASE ORAL at 20:28

## 2020-02-07 RX ADMIN — Medication 0.5 MILLIGRAM(S): at 20:28

## 2020-02-07 RX ADMIN — OLANZAPINE 10 MILLIGRAM(S): 15 TABLET, FILM COATED ORAL at 20:28

## 2020-02-08 RX ADMIN — DIVALPROEX SODIUM 1000 MILLIGRAM(S): 500 TABLET, DELAYED RELEASE ORAL at 21:31

## 2020-02-08 RX ADMIN — OLANZAPINE 12.5 MILLIGRAM(S): 15 TABLET, FILM COATED ORAL at 21:31

## 2020-02-08 RX ADMIN — Medication 0.5 MILLIGRAM(S): at 21:31

## 2020-02-09 RX ADMIN — Medication 0.5 MILLIGRAM(S): at 21:10

## 2020-02-09 RX ADMIN — DIVALPROEX SODIUM 1000 MILLIGRAM(S): 500 TABLET, DELAYED RELEASE ORAL at 21:10

## 2020-02-09 RX ADMIN — PANTOPRAZOLE SODIUM 40 MILLIGRAM(S): 20 TABLET, DELAYED RELEASE ORAL at 08:28

## 2020-02-09 RX ADMIN — OLANZAPINE 12.5 MILLIGRAM(S): 15 TABLET, FILM COATED ORAL at 21:10

## 2020-02-10 PROCEDURE — 99232 SBSQ HOSP IP/OBS MODERATE 35: CPT

## 2020-02-10 RX ADMIN — OLANZAPINE 12.5 MILLIGRAM(S): 15 TABLET, FILM COATED ORAL at 20:35

## 2020-02-10 RX ADMIN — Medication 0.5 MILLIGRAM(S): at 20:16

## 2020-02-10 RX ADMIN — DIVALPROEX SODIUM 1000 MILLIGRAM(S): 500 TABLET, DELAYED RELEASE ORAL at 20:35

## 2020-02-10 RX ADMIN — PANTOPRAZOLE SODIUM 40 MILLIGRAM(S): 20 TABLET, DELAYED RELEASE ORAL at 07:36

## 2020-02-11 PROCEDURE — 99231 SBSQ HOSP IP/OBS SF/LOW 25: CPT

## 2020-02-11 RX ADMIN — DIVALPROEX SODIUM 1000 MILLIGRAM(S): 500 TABLET, DELAYED RELEASE ORAL at 21:11

## 2020-02-11 RX ADMIN — OLANZAPINE 12.5 MILLIGRAM(S): 15 TABLET, FILM COATED ORAL at 21:10

## 2020-02-11 RX ADMIN — PANTOPRAZOLE SODIUM 40 MILLIGRAM(S): 20 TABLET, DELAYED RELEASE ORAL at 07:49

## 2020-02-12 PROCEDURE — 99231 SBSQ HOSP IP/OBS SF/LOW 25: CPT

## 2020-02-12 RX ORDER — CLONAZEPAM 1 MG
0.5 TABLET ORAL AT BEDTIME
Refills: 0 | Status: DISCONTINUED | OUTPATIENT
Start: 2020-02-12 | End: 2020-02-18

## 2020-02-12 RX ADMIN — Medication 0.5 MILLIGRAM(S): at 20:41

## 2020-02-12 RX ADMIN — OLANZAPINE 12.5 MILLIGRAM(S): 15 TABLET, FILM COATED ORAL at 20:41

## 2020-02-12 RX ADMIN — HALOPERIDOL DECANOATE 5 MILLIGRAM(S): 100 INJECTION INTRAMUSCULAR at 00:48

## 2020-02-12 RX ADMIN — Medication 50 MILLIGRAM(S): at 00:48

## 2020-02-12 RX ADMIN — DIVALPROEX SODIUM 1000 MILLIGRAM(S): 500 TABLET, DELAYED RELEASE ORAL at 20:41

## 2020-02-13 PROCEDURE — 99232 SBSQ HOSP IP/OBS MODERATE 35: CPT

## 2020-02-13 RX ORDER — PALIPERIDONE 1.5 MG/1
234 TABLET, EXTENDED RELEASE ORAL ONCE
Refills: 0 | Status: COMPLETED | OUTPATIENT
Start: 2020-02-16 | End: 2020-02-16

## 2020-02-13 RX ORDER — TUBERCULIN PURIFIED PROTEIN DERIVATIVE 5 [IU]/.1ML
5 INJECTION, SOLUTION INTRADERMAL ONCE
Refills: 0 | Status: COMPLETED | OUTPATIENT
Start: 2020-02-14 | End: 2020-02-14

## 2020-02-13 RX ADMIN — PANTOPRAZOLE SODIUM 40 MILLIGRAM(S): 20 TABLET, DELAYED RELEASE ORAL at 09:04

## 2020-02-13 RX ADMIN — DIVALPROEX SODIUM 1000 MILLIGRAM(S): 500 TABLET, DELAYED RELEASE ORAL at 20:43

## 2020-02-13 RX ADMIN — OLANZAPINE 12.5 MILLIGRAM(S): 15 TABLET, FILM COATED ORAL at 20:43

## 2020-02-13 RX ADMIN — Medication 0.5 MILLIGRAM(S): at 20:08

## 2020-02-14 PROCEDURE — 99231 SBSQ HOSP IP/OBS SF/LOW 25: CPT

## 2020-02-14 RX ORDER — OLANZAPINE 15 MG/1
12.5 TABLET, FILM COATED ORAL AT BEDTIME
Refills: 0 | Status: DISCONTINUED | OUTPATIENT
Start: 2020-02-14 | End: 2020-02-18

## 2020-02-14 RX ADMIN — TUBERCULIN PURIFIED PROTEIN DERIVATIVE 5 UNIT(S): 5 INJECTION, SOLUTION INTRADERMAL at 10:10

## 2020-02-14 RX ADMIN — Medication 0.5 MILLIGRAM(S): at 21:11

## 2020-02-14 RX ADMIN — OLANZAPINE 12.5 MILLIGRAM(S): 15 TABLET, FILM COATED ORAL at 21:11

## 2020-02-14 RX ADMIN — DIVALPROEX SODIUM 1000 MILLIGRAM(S): 500 TABLET, DELAYED RELEASE ORAL at 21:11

## 2020-02-14 RX ADMIN — PANTOPRAZOLE SODIUM 40 MILLIGRAM(S): 20 TABLET, DELAYED RELEASE ORAL at 08:13

## 2020-02-15 RX ORDER — LANOLIN ALCOHOL/MO/W.PET/CERES
3 CREAM (GRAM) TOPICAL ONCE
Refills: 0 | Status: COMPLETED | OUTPATIENT
Start: 2020-02-15 | End: 2020-02-15

## 2020-02-15 RX ADMIN — DIVALPROEX SODIUM 1000 MILLIGRAM(S): 500 TABLET, DELAYED RELEASE ORAL at 20:04

## 2020-02-15 RX ADMIN — OLANZAPINE 12.5 MILLIGRAM(S): 15 TABLET, FILM COATED ORAL at 20:04

## 2020-02-15 RX ADMIN — PANTOPRAZOLE SODIUM 40 MILLIGRAM(S): 20 TABLET, DELAYED RELEASE ORAL at 08:44

## 2020-02-15 RX ADMIN — Medication 3 MILLIGRAM(S): at 01:47

## 2020-02-15 RX ADMIN — Medication 0.5 MILLIGRAM(S): at 20:04

## 2020-02-16 RX ADMIN — Medication 0.5 MILLIGRAM(S): at 20:02

## 2020-02-16 RX ADMIN — TUBERCULIN PURIFIED PROTEIN DERIVATIVE 5 UNIT(S): 5 INJECTION, SOLUTION INTRADERMAL at 09:21

## 2020-02-16 RX ADMIN — PANTOPRAZOLE SODIUM 40 MILLIGRAM(S): 20 TABLET, DELAYED RELEASE ORAL at 08:44

## 2020-02-16 RX ADMIN — DIVALPROEX SODIUM 1000 MILLIGRAM(S): 500 TABLET, DELAYED RELEASE ORAL at 20:02

## 2020-02-16 RX ADMIN — PALIPERIDONE 234 MILLIGRAM(S): 1.5 TABLET, EXTENDED RELEASE ORAL at 09:19

## 2020-02-16 RX ADMIN — OLANZAPINE 12.5 MILLIGRAM(S): 15 TABLET, FILM COATED ORAL at 20:02

## 2020-02-17 RX ADMIN — PANTOPRAZOLE SODIUM 40 MILLIGRAM(S): 20 TABLET, DELAYED RELEASE ORAL at 09:46

## 2020-02-18 PROCEDURE — 99232 SBSQ HOSP IP/OBS MODERATE 35: CPT

## 2020-02-18 RX ORDER — OLANZAPINE 15 MG/1
15 TABLET, FILM COATED ORAL AT BEDTIME
Refills: 0 | Status: DISCONTINUED | OUTPATIENT
Start: 2020-02-18 | End: 2020-02-21

## 2020-02-18 RX ORDER — BENZTROPINE MESYLATE 1 MG
0.5 TABLET ORAL
Refills: 0 | Status: DISCONTINUED | OUTPATIENT
Start: 2020-02-18 | End: 2020-02-21

## 2020-02-18 RX ORDER — CLONAZEPAM 1 MG
1 TABLET ORAL AT BEDTIME
Refills: 0 | Status: DISCONTINUED | OUTPATIENT
Start: 2020-02-18 | End: 2020-02-21

## 2020-02-18 RX ADMIN — PANTOPRAZOLE SODIUM 40 MILLIGRAM(S): 20 TABLET, DELAYED RELEASE ORAL at 08:14

## 2020-02-18 RX ADMIN — OLANZAPINE 15 MILLIGRAM(S): 15 TABLET, FILM COATED ORAL at 20:38

## 2020-02-18 RX ADMIN — Medication 0.5 MILLIGRAM(S): at 08:59

## 2020-02-18 RX ADMIN — DIVALPROEX SODIUM 1000 MILLIGRAM(S): 500 TABLET, DELAYED RELEASE ORAL at 20:38

## 2020-02-18 RX ADMIN — Medication 1 MILLIGRAM(S): at 20:38

## 2020-02-18 RX ADMIN — Medication 0.5 MILLIGRAM(S): at 20:38

## 2020-02-19 PROCEDURE — 99232 SBSQ HOSP IP/OBS MODERATE 35: CPT

## 2020-02-19 PROCEDURE — 71045 X-RAY EXAM CHEST 1 VIEW: CPT | Mod: 26

## 2020-02-19 RX ADMIN — PANTOPRAZOLE SODIUM 40 MILLIGRAM(S): 20 TABLET, DELAYED RELEASE ORAL at 08:19

## 2020-02-19 RX ADMIN — Medication 1 MILLIGRAM(S): at 20:11

## 2020-02-19 RX ADMIN — Medication 0.5 MILLIGRAM(S): at 08:19

## 2020-02-19 RX ADMIN — Medication 0.5 MILLIGRAM(S): at 20:18

## 2020-02-19 RX ADMIN — OLANZAPINE 15 MILLIGRAM(S): 15 TABLET, FILM COATED ORAL at 20:18

## 2020-02-19 RX ADMIN — DIVALPROEX SODIUM 1000 MILLIGRAM(S): 500 TABLET, DELAYED RELEASE ORAL at 20:18

## 2020-02-20 RX ADMIN — OLANZAPINE 15 MILLIGRAM(S): 15 TABLET, FILM COATED ORAL at 20:28

## 2020-02-20 RX ADMIN — Medication 0.5 MILLIGRAM(S): at 09:33

## 2020-02-20 RX ADMIN — Medication 50 MILLIGRAM(S): at 00:30

## 2020-02-20 RX ADMIN — DIVALPROEX SODIUM 1000 MILLIGRAM(S): 500 TABLET, DELAYED RELEASE ORAL at 20:28

## 2020-02-20 RX ADMIN — Medication 1 MILLIGRAM(S): at 20:28

## 2020-02-20 RX ADMIN — PANTOPRAZOLE SODIUM 40 MILLIGRAM(S): 20 TABLET, DELAYED RELEASE ORAL at 09:33

## 2020-02-20 RX ADMIN — Medication 50 MILLIGRAM(S): at 23:58

## 2020-02-20 RX ADMIN — Medication 0.5 MILLIGRAM(S): at 20:28

## 2020-02-21 VITALS — TEMPERATURE: 98 F | HEART RATE: 89 BPM | SYSTOLIC BLOOD PRESSURE: 132 MMHG | DIASTOLIC BLOOD PRESSURE: 70 MMHG

## 2020-02-21 PROCEDURE — 99238 HOSP IP/OBS DSCHRG MGMT 30/<: CPT

## 2020-02-21 RX ORDER — CLONAZEPAM 1 MG
1 TABLET ORAL
Qty: 14 | Refills: 0
Start: 2020-02-21 | End: 2020-03-05

## 2020-02-21 RX ORDER — CLONAZEPAM 1 MG
1 TABLET ORAL
Qty: 30 | Refills: 0
Start: 2020-02-21 | End: 2020-03-21

## 2020-02-21 RX ORDER — DIVALPROEX SODIUM 500 MG/1
2 TABLET, DELAYED RELEASE ORAL
Qty: 28 | Refills: 0
Start: 2020-02-21 | End: 2020-03-05

## 2020-02-21 RX ORDER — BENZTROPINE MESYLATE 1 MG
0 TABLET ORAL
Qty: 0 | Refills: 0 | DISCHARGE

## 2020-02-21 RX ORDER — PALIPERIDONE 1.5 MG/1
0 TABLET, EXTENDED RELEASE ORAL
Qty: 0 | Refills: 0 | DISCHARGE

## 2020-02-21 RX ORDER — DIVALPROEX SODIUM 500 MG/1
2 TABLET, DELAYED RELEASE ORAL
Qty: 60 | Refills: 0
Start: 2020-02-21 | End: 2020-03-21

## 2020-02-21 RX ORDER — DIVALPROEX SODIUM 500 MG/1
2 TABLET, DELAYED RELEASE ORAL
Qty: 0 | Refills: 0 | DISCHARGE

## 2020-02-21 RX ORDER — BENZTROPINE MESYLATE 1 MG
1 TABLET ORAL
Qty: 60 | Refills: 0
Start: 2020-02-21 | End: 2020-03-21

## 2020-02-21 RX ORDER — FLUOXETINE HCL 10 MG
1 CAPSULE ORAL
Qty: 0 | Refills: 0 | DISCHARGE

## 2020-02-21 RX ORDER — BENZTROPINE MESYLATE 1 MG
1 TABLET ORAL
Qty: 28 | Refills: 0
Start: 2020-02-21 | End: 2020-03-05

## 2020-02-21 RX ORDER — OLANZAPINE 15 MG/1
1 TABLET, FILM COATED ORAL
Qty: 14 | Refills: 0
Start: 2020-02-21 | End: 2020-03-05

## 2020-02-21 RX ORDER — OLANZAPINE 15 MG/1
1 TABLET, FILM COATED ORAL
Qty: 30 | Refills: 0
Start: 2020-02-21 | End: 2020-03-21

## 2020-02-21 RX ADMIN — PANTOPRAZOLE SODIUM 40 MILLIGRAM(S): 20 TABLET, DELAYED RELEASE ORAL at 07:54

## 2020-02-21 RX ADMIN — Medication 0.5 MILLIGRAM(S): at 07:54

## 2020-12-16 ENCOUNTER — EMERGENCY (EMERGENCY)
Facility: HOSPITAL | Age: 45
LOS: 1 days | Discharge: HOME | End: 2020-12-16
Attending: EMERGENCY MEDICINE
Payer: MEDICAID

## 2020-12-16 VITALS
DIASTOLIC BLOOD PRESSURE: 82 MMHG | OXYGEN SATURATION: 95 % | SYSTOLIC BLOOD PRESSURE: 132 MMHG | RESPIRATION RATE: 18 BRPM | TEMPERATURE: 98 F | HEART RATE: 111 BPM

## 2020-12-16 DIAGNOSIS — F25.9 SCHIZOAFFECTIVE DISORDER, UNSPECIFIED: ICD-10-CM

## 2020-12-16 LAB
ALBUMIN SERPL ELPH-MCNC: 4.4 G/DL — SIGNIFICANT CHANGE UP (ref 3.3–5)
ALP SERPL-CCNC: 76 U/L — SIGNIFICANT CHANGE UP (ref 40–120)
ALT FLD-CCNC: 24 U/L — SIGNIFICANT CHANGE UP (ref 10–45)
ANION GAP SERPL CALC-SCNC: 12 MMOL/L — SIGNIFICANT CHANGE UP (ref 5–17)
APAP SERPL-MCNC: <15 UG/ML — SIGNIFICANT CHANGE UP (ref 10–30)
APPEARANCE UR: CLEAR — SIGNIFICANT CHANGE UP
AST SERPL-CCNC: 17 U/L — SIGNIFICANT CHANGE UP (ref 10–40)
BACTERIA # UR AUTO: NEGATIVE — SIGNIFICANT CHANGE UP
BASOPHILS # BLD AUTO: 0.03 K/UL — SIGNIFICANT CHANGE UP (ref 0–0.2)
BASOPHILS NFR BLD AUTO: 0.4 % — SIGNIFICANT CHANGE UP (ref 0–2)
BILIRUB SERPL-MCNC: 0.3 MG/DL — SIGNIFICANT CHANGE UP (ref 0.2–1.2)
BILIRUB UR-MCNC: NEGATIVE — SIGNIFICANT CHANGE UP
BUN SERPL-MCNC: 19 MG/DL — SIGNIFICANT CHANGE UP (ref 7–23)
CALCIUM SERPL-MCNC: 9.7 MG/DL — SIGNIFICANT CHANGE UP (ref 8.4–10.5)
CHLORIDE SERPL-SCNC: 101 MMOL/L — SIGNIFICANT CHANGE UP (ref 96–108)
CO2 SERPL-SCNC: 25 MMOL/L — SIGNIFICANT CHANGE UP (ref 22–31)
COLOR SPEC: YELLOW — SIGNIFICANT CHANGE UP
CREAT SERPL-MCNC: 0.63 MG/DL — SIGNIFICANT CHANGE UP (ref 0.5–1.3)
DIFF PNL FLD: NEGATIVE — SIGNIFICANT CHANGE UP
EOSINOPHIL # BLD AUTO: 0.12 K/UL — SIGNIFICANT CHANGE UP (ref 0–0.5)
EOSINOPHIL NFR BLD AUTO: 1.5 % — SIGNIFICANT CHANGE UP (ref 0–6)
EPI CELLS # UR: 4 /HPF — SIGNIFICANT CHANGE UP
ETHANOL SERPL-MCNC: SIGNIFICANT CHANGE UP MG/DL (ref 0–10)
GLUCOSE SERPL-MCNC: 148 MG/DL — HIGH (ref 70–99)
GLUCOSE UR QL: ABNORMAL
HCG SERPL-ACNC: <2 MIU/ML — SIGNIFICANT CHANGE UP
HCT VFR BLD CALC: 40.6 % — SIGNIFICANT CHANGE UP (ref 34.5–45)
HGB BLD-MCNC: 12.9 G/DL — SIGNIFICANT CHANGE UP (ref 11.5–15.5)
HYALINE CASTS # UR AUTO: 0 /LPF — SIGNIFICANT CHANGE UP (ref 0–2)
IMM GRANULOCYTES NFR BLD AUTO: 0.6 % — SIGNIFICANT CHANGE UP (ref 0–1.5)
KETONES UR-MCNC: NEGATIVE — SIGNIFICANT CHANGE UP
LEUKOCYTE ESTERASE UR-ACNC: ABNORMAL
LYMPHOCYTES # BLD AUTO: 2.49 K/UL — SIGNIFICANT CHANGE UP (ref 1–3.3)
LYMPHOCYTES # BLD AUTO: 30.7 % — SIGNIFICANT CHANGE UP (ref 13–44)
MAGNESIUM SERPL-MCNC: 2 MG/DL — SIGNIFICANT CHANGE UP (ref 1.6–2.6)
MCHC RBC-ENTMCNC: 29.9 PG — SIGNIFICANT CHANGE UP (ref 27–34)
MCHC RBC-ENTMCNC: 31.8 GM/DL — LOW (ref 32–36)
MCV RBC AUTO: 94 FL — SIGNIFICANT CHANGE UP (ref 80–100)
MONOCYTES # BLD AUTO: 0.71 K/UL — SIGNIFICANT CHANGE UP (ref 0–0.9)
MONOCYTES NFR BLD AUTO: 8.7 % — SIGNIFICANT CHANGE UP (ref 2–14)
NEUTROPHILS # BLD AUTO: 4.72 K/UL — SIGNIFICANT CHANGE UP (ref 1.8–7.4)
NEUTROPHILS NFR BLD AUTO: 58.1 % — SIGNIFICANT CHANGE UP (ref 43–77)
NITRITE UR-MCNC: NEGATIVE — SIGNIFICANT CHANGE UP
NRBC # BLD: 0 /100 WBCS — SIGNIFICANT CHANGE UP (ref 0–0)
PH UR: 6 — SIGNIFICANT CHANGE UP (ref 5–8)
PHOSPHATE SERPL-MCNC: 3.6 MG/DL — SIGNIFICANT CHANGE UP (ref 2.5–4.5)
PLATELET # BLD AUTO: 286 K/UL — SIGNIFICANT CHANGE UP (ref 150–400)
POTASSIUM SERPL-MCNC: 4 MMOL/L — SIGNIFICANT CHANGE UP (ref 3.5–5.3)
POTASSIUM SERPL-SCNC: 4 MMOL/L — SIGNIFICANT CHANGE UP (ref 3.5–5.3)
PROT SERPL-MCNC: 7.2 G/DL — SIGNIFICANT CHANGE UP (ref 6–8.3)
PROT UR-MCNC: SIGNIFICANT CHANGE UP
RBC # BLD: 4.32 M/UL — SIGNIFICANT CHANGE UP (ref 3.8–5.2)
RBC # FLD: 12.8 % — SIGNIFICANT CHANGE UP (ref 10.3–14.5)
RBC CASTS # UR COMP ASSIST: 1 /HPF — SIGNIFICANT CHANGE UP (ref 0–4)
SALICYLATES SERPL-MCNC: <2 MG/DL — LOW (ref 15–30)
SARS-COV-2 RNA SPEC QL NAA+PROBE: SIGNIFICANT CHANGE UP
SODIUM SERPL-SCNC: 138 MMOL/L — SIGNIFICANT CHANGE UP (ref 135–145)
SP GR SPEC: 1.03 — HIGH (ref 1.01–1.02)
UROBILINOGEN FLD QL: ABNORMAL
WBC # BLD: 8.12 K/UL — SIGNIFICANT CHANGE UP (ref 3.8–10.5)
WBC # FLD AUTO: 8.12 K/UL — SIGNIFICANT CHANGE UP (ref 3.8–10.5)
WBC UR QL: 11 /HPF — HIGH (ref 0–5)

## 2020-12-16 PROCEDURE — 99285 EMERGENCY DEPT VISIT HI MDM: CPT

## 2020-12-16 PROCEDURE — 71045 X-RAY EXAM CHEST 1 VIEW: CPT | Mod: 26

## 2020-12-16 PROCEDURE — 90792 PSYCH DIAG EVAL W/MED SRVCS: CPT | Mod: 95

## 2020-12-16 RX ORDER — CEFTRIAXONE 500 MG/1
1000 INJECTION, POWDER, FOR SOLUTION INTRAMUSCULAR; INTRAVENOUS ONCE
Refills: 0 | Status: DISCONTINUED | OUTPATIENT
Start: 2020-12-16 | End: 2020-12-16

## 2020-12-16 RX ORDER — CEFPODOXIME PROXETIL 100 MG
100 TABLET ORAL ONCE
Refills: 0 | Status: COMPLETED | OUTPATIENT
Start: 2020-12-16 | End: 2020-12-16

## 2020-12-16 NOTE — ED BEHAVIORAL HEALTH ASSESSMENT NOTE - RISK ASSESSMENT
increased risk of aggression due to unpredictable behavior, impulsivity, aggression earlier today, lack of insight, medication non-adherence Low Acute Suicide Risk
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
## 2020-12-16 NOTE — ED BEHAVIORAL HEALTH ASSESSMENT NOTE - DETAILS
discussed with SW from residence pending bed availability per chart, probable past suicide attempt but nothing recent
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
## 2020-12-16 NOTE — ED ADULT NURSE NOTE - ED STAT RN HANDOFF DETAILS
report given to Sahra Roe RN Report given to EMS, night nurse Lew already gave report to 2N RN at [facility]
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
## 2020-12-16 NOTE — ED BEHAVIORAL HEALTH ASSESSMENT NOTE - CURRENT MEDICATION
zyprexa 15mg daily  klon 1mg daily  depakote 500mg bid   invega sustenna 234 RAMOS monthly last received 12/15/2020
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
## 2020-12-16 NOTE — ED BEHAVIORAL HEALTH ASSESSMENT NOTE - OTHER PAST PSYCHIATRIC HISTORY (INCLUDE DETAILS REGARDING ONSET, COURSE OF ILLNESS, INPATIENT/OUTPATIENT TREATMENT)
AOT order from 6/28/2019 to 6/28/2020  ACT team: [facility] [employer]   Inpatient Hosp: [facility] 1/2016, [place] 2/2016 (66 days), [facility] 4/2016 (433 days), [facility] 1/2020 (27 days).
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
## 2020-12-16 NOTE — ED PROVIDER NOTE - PROGRESS NOTE DETAILS
Dr. Jayesh Loja, PGY-3: spoke w/  Epi. Reports pt has been noncompliant w/ meds for approx 1 month. Has been having outbursts at the group home where she creams and cries and then will be quiet. Denies to staff that she has any problems. Psychiatrist is Dr. Gatito Torres [phone]. Dr. Jayesh Loja, PGY-3: spoke w/ tele psych who will eval pt. Dr. Jayesh Loja, PGY-3: spoke w/ tele psych. Pt will be 2PC. +UA. Ordered cefpodoxime. Pt to take 100 mg of cefpodoxime twice a day for 5 days Attending Maura Carty: pt's heart rate improved. pt afebrile. nontoxic appearing. d/w psych. . start abx for UTI
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
## 2020-12-16 NOTE — ED PROVIDER NOTE - PHYSICAL EXAMINATION
Gen: NAD, AOx3, able to make needs known, non-toxic  Head: NCAT  HEENT: EOMI, oral mucosa moist, normal conjunctiva  Lung: CTAB, no respiratory distress, no wheezes/rhonchi/rales B/L, speaking in full sentences  CV: RRR, no murmurs  Abd: soft, NTND, no guarding, no CVA tenderness  MSK: no visible deformities  Neuro: Appears non focal  Skin: Warm, well perfused  Psych: poor eye contact, internally preoccupied, calm, cooperative, no tangential speech Gen: NAD, AOx3, able to make needs known, non-toxic  Head: NCAT  HEENT: EOMI, oral mucosa moist, normal conjunctiva  Lung: CTAB, no respiratory distress, no wheezes/rhonchi/rales B/L, speaking in full sentences  CV: RRR, no murmurs  Abd: soft, NTND, no guarding, no CVA tenderness  MSK: no visible deformities  Neuro: Appears non focal  Skin: Warm, well perfused  Psych: poor eye contact, internally preoccupied, calm, cooperative, no tangential speech  Attending Maura Carty: Gen: NAD, heent: atrauamtic, eomi, perrla, mmm, op pink, uvula midline, neck; nttp, no nuchal rigidity, chest: nttp, no crepitus, cv: rrr, no murmurs, lungs: ctab, abd: soft, nontender, nondistended, no peritoneal signs, +BS, no guarding, ext: wwp, neg homans, skin: no rash, neuro: awake and alert, following commands,stable gait, moving all extremities, strength intact

## 2020-12-16 NOTE — ED PROVIDER NOTE - ATTENDING CONTRIBUTION TO CARE
Attending MD Maura Carty:  I personally have seen and examined this patient.  Resident note reviewed and agree on plan of care and except where noted.  See HPI, PE, and MDM for details.

## 2020-12-17 ENCOUNTER — INPATIENT (INPATIENT)
Facility: HOSPITAL | Age: 45
LOS: 27 days | Discharge: HOME | End: 2021-01-14
Attending: PSYCHIATRY & NEUROLOGY | Admitting: PSYCHIATRY & NEUROLOGY
Payer: MEDICAID

## 2020-12-17 VITALS
RESPIRATION RATE: 18 BRPM | TEMPERATURE: 98 F | DIASTOLIC BLOOD PRESSURE: 88 MMHG | HEART RATE: 99 BPM | OXYGEN SATURATION: 97 % | SYSTOLIC BLOOD PRESSURE: 132 MMHG

## 2020-12-17 VITALS — TEMPERATURE: 98 F | HEIGHT: 56 IN

## 2020-12-17 DIAGNOSIS — F33.9 MAJOR DEPRESSIVE DISORDER, RECURRENT, UNSPECIFIED: ICD-10-CM

## 2020-12-17 PROBLEM — F20.9 SCHIZOPHRENIA, UNSPECIFIED: Chronic | Status: ACTIVE | Noted: 2020-01-24

## 2020-12-17 LAB
AMMONIA BLD-MCNC: 21 UMOL/L — SIGNIFICANT CHANGE UP (ref 11–55)
SARS-COV-2 IGG SERPL QL IA: NEGATIVE — SIGNIFICANT CHANGE UP
SARS-COV-2 IGM SERPL IA-ACNC: <0.1 INDEX — SIGNIFICANT CHANGE UP
TSH SERPL-MCNC: 1.34 UIU/ML — SIGNIFICANT CHANGE UP (ref 0.27–4.2)
VALPROATE SERPL-MCNC: <3 UG/ML — LOW (ref 50–100)

## 2020-12-17 PROCEDURE — 80053 COMPREHEN METABOLIC PANEL: CPT

## 2020-12-17 PROCEDURE — 83735 ASSAY OF MAGNESIUM: CPT

## 2020-12-17 PROCEDURE — 82140 ASSAY OF AMMONIA: CPT

## 2020-12-17 PROCEDURE — 85025 COMPLETE CBC W/AUTO DIFF WBC: CPT

## 2020-12-17 PROCEDURE — 71045 X-RAY EXAM CHEST 1 VIEW: CPT

## 2020-12-17 PROCEDURE — 80164 ASSAY DIPROPYLACETIC ACD TOT: CPT

## 2020-12-17 PROCEDURE — 81001 URINALYSIS AUTO W/SCOPE: CPT

## 2020-12-17 PROCEDURE — 93005 ELECTROCARDIOGRAM TRACING: CPT

## 2020-12-17 PROCEDURE — 99285 EMERGENCY DEPT VISIT HI MDM: CPT

## 2020-12-17 PROCEDURE — 84443 ASSAY THYROID STIM HORMONE: CPT

## 2020-12-17 PROCEDURE — 87635 SARS-COV-2 COVID-19 AMP PRB: CPT

## 2020-12-17 PROCEDURE — 87086 URINE CULTURE/COLONY COUNT: CPT

## 2020-12-17 PROCEDURE — 84100 ASSAY OF PHOSPHORUS: CPT

## 2020-12-17 PROCEDURE — 86769 SARS-COV-2 COVID-19 ANTIBODY: CPT

## 2020-12-17 PROCEDURE — 80307 DRUG TEST PRSMV CHEM ANLYZR: CPT

## 2020-12-17 PROCEDURE — 84702 CHORIONIC GONADOTROPIN TEST: CPT

## 2020-12-17 RX ORDER — DIVALPROEX SODIUM 500 MG/1
500 TABLET, DELAYED RELEASE ORAL
Refills: 0 | Status: DISCONTINUED | OUTPATIENT
Start: 2020-12-17 | End: 2020-12-24

## 2020-12-17 RX ORDER — OLANZAPINE 15 MG/1
15 TABLET, FILM COATED ORAL AT BEDTIME
Refills: 0 | Status: DISCONTINUED | OUTPATIENT
Start: 2020-12-17 | End: 2021-01-05

## 2020-12-17 RX ORDER — DIPHENHYDRAMINE HCL 50 MG
50 CAPSULE ORAL EVERY 4 HOURS
Refills: 0 | Status: DISCONTINUED | OUTPATIENT
Start: 2020-12-17 | End: 2021-01-14

## 2020-12-17 RX ORDER — HALOPERIDOL DECANOATE 100 MG/ML
7.5 INJECTION INTRAMUSCULAR EVERY 4 HOURS
Refills: 0 | Status: DISCONTINUED | OUTPATIENT
Start: 2020-12-17 | End: 2020-12-22

## 2020-12-17 RX ORDER — DIPHENHYDRAMINE HCL 50 MG
50 CAPSULE ORAL ONCE
Refills: 0 | Status: DISCONTINUED | OUTPATIENT
Start: 2020-12-17 | End: 2021-01-14

## 2020-12-17 RX ORDER — CLONAZEPAM 1 MG
1 TABLET ORAL
Refills: 0 | Status: DISCONTINUED | OUTPATIENT
Start: 2020-12-17 | End: 2020-12-22

## 2020-12-17 RX ORDER — HALOPERIDOL DECANOATE 100 MG/ML
7.5 INJECTION INTRAMUSCULAR ONCE
Refills: 0 | Status: DISCONTINUED | OUTPATIENT
Start: 2020-12-17 | End: 2020-12-22

## 2020-12-17 RX ADMIN — Medication 100 MILLIGRAM(S): at 00:32

## 2020-12-17 RX ADMIN — OLANZAPINE 15 MILLIGRAM(S): 15 TABLET, FILM COATED ORAL at 21:37

## 2020-12-17 RX ADMIN — Medication 1 MILLIGRAM(S): at 21:37

## 2020-12-17 RX ADMIN — DIVALPROEX SODIUM 500 MILLIGRAM(S): 500 TABLET, DELAYED RELEASE ORAL at 21:37

## 2020-12-17 NOTE — CHART NOTE - NSCHARTNOTEFT_GEN_A_CORE
ED SW: Chart reviewed for inpatient mental health authorization. Telepsych  transfer to [address]. Admitting dx: Schizoaffective d/o. Legal Status: 2PC.  authorization obtained and emailed to Ju[employer].    Insurance: [employer]  Policy #: [id number]  AUTH #: [id number]  Days approved: 14 (12/17-12/30)  Next review: 12/30 with Tory (P. [phone] ext. [phone])

## 2020-12-17 NOTE — BH INPATIENT PSYCHIATRY ASSESSMENT NOTE - NSBHCHARTREVIEWVS_PSY_A_CORE FT
Vital Signs Last 24 Hrs  T(C): 36.8 (17 Dec 2020 10:37), Max: 37.2 (16 Dec 2020 21:18)  T(F): 98.3 (17 Dec 2020 10:37), Max: 98.9 (16 Dec 2020 21:18)  HR: 99 (17 Dec 2020 09:05) (92 - 120)  BP: 132/88 (17 Dec 2020 09:05) (106/64 - 132/88)  BP(mean): 103 (17 Dec 2020 09:05) (78 - 103)  RR: 18 (17 Dec 2020 09:05) (18 - 20)  SpO2: 97% (17 Dec 2020 09:05) (95% - 97%)

## 2020-12-17 NOTE — BH INPATIENT PSYCHIATRY ASSESSMENT NOTE - CURRENT MEDICATION
MEDICATIONS  (STANDING):  clonazePAM  Tablet 1 milliGRAM(s) Oral two times a day  diVALproex  milliGRAM(s) Oral two times a day  OLANZapine 15 milliGRAM(s) Oral at bedtime    MEDICATIONS  (PRN):  diphenhydrAMINE 50 milliGRAM(s) Oral every 4 hours PRN Rash and/or Itching  diphenhydrAMINE   Injectable 50 milliGRAM(s) IntraMuscular once PRN agitation  haloperidol     Tablet 7.5 milliGRAM(s) Oral every 4 hours PRN agitation  haloperidol    Injectable 7.5 milliGRAM(s) IntraMuscular once PRN agitation  LORazepam     Tablet 3 milliGRAM(s) Oral every 4 hours PRN agitation  LORazepam   Injectable 3 milliGRAM(s) IntraMuscular once PRN Agitation

## 2020-12-17 NOTE — ED ADULT NURSE REASSESSMENT NOTE - NS ED NURSE REASSESS COMMENT FT1
EMS to tx patient to [facility].  phone used to inform patient, all questions answered (English [phone]). VS reassessed. Safety maintained. Belongings returned, no valuables.

## 2020-12-17 NOTE — BH INPATIENT PSYCHIATRY ASSESSMENT NOTE - NSTXDCOPNOINTERMD_PSY_ALL_CORE
Pt will be encouraged to be compliant with medications and treatment in the hospital and community.
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
## 2020-12-17 NOTE — BH INPATIENT PSYCHIATRY ASSESSMENT NOTE - MSE UNSTRUCTURED FT
woman, appears discheveled, fair grooming, pleasant, minimally cooperative with interview, AAO to person but not place or situation, appropriate eye contact. Motor: No psychomotor abnormalities. Speech: Poverty of speech, difficult to assess due to use of . Mood: "good" Affect: euthymic, oddly related, smiles inappropriately TP: disorganized, TC: No SI/HI/AH/VH elicited. Perceptions: internally preoccupied. Insight: poor. Judgement: poor. Attention: fair. Gait: normal.

## 2020-12-17 NOTE — BH SOCIAL WORK INITIAL PSYCHOSOCIAL EVALUATION - OTHER PAST PSYCHIATRIC HISTORY (INCLUDE DETAILS REGARDING ONSET, COURSE OF ILLNESS, INPATIENT/OUTPATIENT TREATMENT)
As per ED Behavioral Health Assessment Note on 12/16/2020: "44yo Chinese woman, Mandarin-speaking, domiciled at [facility], PMHx of allergic rhinitis, GERD, and a PPHx of schizoaffective disorder, multiple hospitalizations including a 440-day hospitalization from 4/2016-6/2017, most recently hospitalized in 1/2020 for 1 month. She is brought to the hospital by EMS activated by her residence for agitation, aggression and medication non-adherence."

## 2020-12-17 NOTE — BH INPATIENT PSYCHIATRY ASSESSMENT NOTE - OTHER PAST PSYCHIATRIC HISTORY (INCLUDE DETAILS REGARDING ONSET, COURSE OF ILLNESS, INPATIENT/OUTPATIENT TREATMENT)
Per Chart Review:  - PPH Dx of schizoaffective disorder  - Inpatient Hosp: [facility] 1/2016, [facility] 2/2016 (66 days), [facility] 4/2016 (433 days), [facility] 1/2020 (27 days).  - AOT order from 6/28/2019 to 6/28/2020  - [facility]Anna Coleman #[phone])

## 2020-12-17 NOTE — BH INPATIENT PSYCHIATRY ASSESSMENT NOTE - CASE SUMMARY
45F w/schizoaffective disorder.  Pt was admitted with aggression in context of medication noncompliance.  Restarting home meds.

## 2020-12-17 NOTE — ED ADULT NURSE REASSESSMENT NOTE - NS ED NURSE REASSESS COMMENT FT1
RN: Assumed care from RN Lew @0700. Pt asleep. 1:1 constant observer at bedside. Safe environment maintained. To be transferred to [facility]. Awaiting transportation.
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
## 2020-12-17 NOTE — BH INPATIENT PSYCHIATRY ASSESSMENT NOTE - NSDCCRITERIA_PSY_ALL_CORE
When pt is no longer an acute or imminent risk of harm to self or others, and is able to care for self safely, pt may then be discharged.

## 2020-12-17 NOTE — BH INPATIENT PSYCHIATRY ASSESSMENT NOTE - DESCRIPTION
- Domiciled at [facility] (now [facility])  - Per chart, was abandoned by family, pt has her own power of  (as of 1/2020)
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
## 2020-12-17 NOTE — BH INPATIENT PSYCHIATRY ASSESSMENT NOTE - NSBHPSYCHMEDSHX_PSY_A_CORE
Normocephalic and atraumatic. Musculoskeletal:         General: Swelling, tenderness and signs of injury present. Left wrist: He exhibits tenderness and bony tenderness. Arms:    Skin:     Capillary Refill: Capillary refill takes less than 2 seconds. Neurological:      Mental Status: He is alert. Sensory: No sensory deficit. Psychiatric:         Mood and Affect: Mood normal.         Behavior: Behavior normal.         DIFFERENTIAL DIAGNOSIS:   Distal radius fracture     DIAGNOSTIC RESULTS     EKG: All EKG's are interpreted by the Emergency Department Physician who either signs or Co-signs this chart in the absence of a cardiologist.      RADIOLOGY: non-plain film images(s) such as CT, Ultrasound and MRI are read by the radiologist.       XR RADIUS ULNA LEFT (2 VIEWS) (Final result)   Result time 11/07/20 13:21:35   Final result by Lani Cyr MD (11/07/20 13:21:35)                 Impression:      Acute fractures of the distal left radial diaphysis and metaphysis with mild volar angulation. **This report has been created using voice recognition software. It may contain minor errors which are inherent in voice recognition technology. **     Final report electronically signed by Dr. Lani Cyr on 11/7/2020 1:21 PM             Narrative:     PROCEDURE: XR RADIUS ULNA LEFT (2 VIEWS)     CLINICAL INFORMATION: trauma. Danielle Half. Pain. COMPARISON: No prior study. TECHNIQUE: AP and lateral views of the left forearm. FINDINGS:         There is an acute obliquely oriented fracture through the distal left radial diaphysis. This extends to the metaphysis. There is mild volar regulation.      There is no fracture of the ulna. The distal radial and ulnar epiphyses and physes are normal. The soft tissues are normal. There are no abnormalities at the level of the elbow or wrist joint.                    LABS:   Labs Reviewed - No data to display    EMERGENCY DEPARTMENT COURSE:   Vitals: Vitals:    11/07/20 1258   BP: 122/73   Pulse: 101   Resp: 18   Temp: 98.1 °F (36.7 °C)   Weight: 60 lb (27.2 kg)     On exam patient seems to demonstrate distal radius pain there is some swelling to the distal radius. X-ray of the left radius ulna shows an acute fracture of the distal left radius diaphysis and metaphysis with mild volar angulation. Good distal pulses are noted good capillary refill is noted. No proximal tenderness is noted no elbow tenderness is noted. Sugar tong splint was applied. Post splint no neurovascular compromise was noted. The patient will be scheduled to meet with orthopedics. Care instructions were discussed with the father. Tylenol or Motrin for pain is recommended. CRITICAL CARE:       CONSULTS:      PROCEDURES:  None    FINAL IMPRESSION      1. Closed fracture of distal end of left radius, unspecified fracture morphology, initial encounter          DISPOSITION/PLAN   Home. Care instructions provided. Follow up with orthopedics. PATIENT REFERRED TO:  No follow-up provider specified.     DISCHARGE MEDICATIONS:  Discharge Medication List as of 11/7/2020  1:41 PM          (Please note that portions of this note were completed with a voice recognition program.  Efforts were made to edit the dictations but occasionally words are mis-transcribed.)    MD Mickie David MD  11/07/20 7145 yes...

## 2020-12-17 NOTE — BH PATIENT PROFILE - HOME MEDICATIONS
clonazePAM 1 mg oral tablet , 1 tab(s) orally once a day (at bedtime) MDD:MAX DAILY DOSE 1 MG  OLANZapine 15 mg oral tablet , 1 tab(s) orally once a day (at bedtime)  benztropine 0.5 mg oral tablet , 1 tab(s) orally 2 times a day  divalproex sodium 500 mg oral tablet, extended release , 2 tab(s) orally once a day (at bedtime)

## 2020-12-17 NOTE — BH INPATIENT PSYCHIATRY ASSESSMENT NOTE - NSBHMETABOLIC_PSY_ALL_CORE_FT
BMI: BMI (kg/m2): 35.4 (12-17-20 @ 10:37)  HbA1c: Hemoglobin A1C, Whole Blood: 5.2 % (01-29-20 @ 08:52)    Glucose:   BP: --  Lipid Panel: Date/Time: 01-29-20 @ 08:52  Cholesterol, Serum: 151  Direct LDL: 87  HDL Cholesterol, Serum: 46  Total Cholesterol/HDL Ration Measurement: --  Triglycerides, Serum: 87

## 2020-12-17 NOTE — BH INPATIENT PSYCHIATRY ASSESSMENT NOTE - NSTXPSYCHOINTERMD_PSY_ALL_CORE
Assessment: 46yo Chinese woman with PPHx of schizoaffective disorder admitted for agitation, aggression and medication non-adherence, presentation on admission c/w psychosis. Patient is likely decompensation in setting of medication non-adherence, as per collateral patient may not have been taking medication as prescribed. Given her hx of chronic delusions resulting in medication non-compliance, patient may benefit from RAMOS.     Plan:  - Admit to 2N  - Patient requires inpatient admission for safety and stabilization.  - Continue home meds. Encourage medication compliance. Consider RAMOS when closer to discharge.   - Legal: 2PC  - Safety: Patient is risk for aggression. At this time does not merit CO. Will continue to monitor.  - Dispo: pending sxs improvement.   - Collateral.  Assessment: 46yo Chinese woman with PPHx of schizoaffective disorder admitted for agitation, aggression and medication non-adherence, presentation on admission c/w psychosis. Patient is likely decompensation in setting of medication non-adherence, as per collateral patient may not have been taking medication as prescribed.      Plan:  - Admit to 2N  - Patient requires inpatient admission for safety and stabilization.  - Continue home meds. Encourage medication compliance.   - Psych, standing: Depakote 500mg twice daily, klonopin 1mg once daily, olanzapine 15mg once daily, and invega sustenna 234mg RAMOS monthly  - Psych, PRNs: Haldol 7.5 mg PO/IM, Ativan 3 mg PO/IM, Benadryl 50 PO/IM PRN aggression/agitation.  - Medical: NTD  - Substance: NTD  - Legal: 2PC  - Safety: Patient is risk for aggression. At this time does not merit CO. Will continue to monitor.  - Dispo: pending sxs improvement.   - Collateral.

## 2020-12-17 NOTE — BH INPATIENT PSYCHIATRY ASSESSMENT NOTE - NSBHASSESSSUMMFT_PSY_ALL_CORE
44yo Chinese woman with PPHx of schizoaffective disorder admitted for agitation, aggression and medication non-adherence. Assessment: 44yo Chinese woman with PPHx of schizoaffective disorder admitted for agitation, aggression and medication non-adherence, presentation on admission c/w psychosis. Patient is likely decompensation in setting of medication non-adherence, as per collateral patient may not have been taking medication as prescribed.      Plan:  - Admit to 2N  - Patient requires inpatient admission for safety and stabilization.  - Continue home meds. Encourage medication compliance.   - Psych, standing: Depakote 500mg twice daily, klonopin 1mg once daily, olanzapine 15mg once daily, and invega sustenna 234mg RAMOS monthly  - Psych, PRNs: Haldol 7.5 mg PO/IM, Ativan 3 mg PO/IM, Benadryl 50 PO/IM PRN aggression/agitation.  - Medical: NTD  - Substance: NTD  - Legal: 2PC  - Safety: Patient is risk for aggression. At this time does not merit CO. Will continue to monitor.  - Dispo: pending sxs improvement.   - Collateral.

## 2020-12-17 NOTE — PSYCHIATRIC REHAB INITIAL EVALUATION - NSBHPRRECOMMEND_PSY_ALL_CORE
Writer met with patient in order to orient patient to unit, and introduce patient to psychiatric staff and department functions. Pt’s primary language is Mandarin. Writer is fluent in Mandarin. Therefore, the meeting was conduct in Mandarin. Patient’s thought was disorganized. Pt provided writer with limited information. Pt was calm during initial interview. However, pt was unable to tolerate the meeting and left in middle of the meeting.  Writer was unable to collaborate with patient to establish a psych rehab goal. Therefore, pt’s psych rehab goal was picked by writer.  Psychiatric rehabilitation staff will continue to engage patient daily in order to develop therapeutic rapport. In response to COVID19, unit programming will be re-evaluated on a consistent basis in effort to maintain safety guidelines.

## 2020-12-17 NOTE — ED ADULT NURSE REASSESSMENT NOTE - NS ED NURSE REASSESS COMMENT FT1
report given to Lisette RN @ [facility], 2 [facility] ems was called for transport. 1:1 CO for aggression maintained.

## 2020-12-17 NOTE — BH INPATIENT PSYCHIATRY ASSESSMENT NOTE - PAST PSYCHOTROPIC MEDICATION
zyprexa 15mg daily  klonopin 1mg daily  depakote 500mg bid   invega sustenna 234 RAMOS monthly, last received 12/15/2020

## 2020-12-18 LAB
CULTURE RESULTS: SIGNIFICANT CHANGE UP
SPECIMEN SOURCE: SIGNIFICANT CHANGE UP

## 2020-12-18 PROCEDURE — 99231 SBSQ HOSP IP/OBS SF/LOW 25: CPT

## 2020-12-18 RX ADMIN — DIVALPROEX SODIUM 500 MILLIGRAM(S): 500 TABLET, DELAYED RELEASE ORAL at 21:12

## 2020-12-18 RX ADMIN — OLANZAPINE 15 MILLIGRAM(S): 15 TABLET, FILM COATED ORAL at 21:12

## 2020-12-18 RX ADMIN — DIVALPROEX SODIUM 500 MILLIGRAM(S): 500 TABLET, DELAYED RELEASE ORAL at 08:43

## 2020-12-18 RX ADMIN — Medication 1 MILLIGRAM(S): at 21:12

## 2020-12-18 RX ADMIN — Medication 1 MILLIGRAM(S): at 08:43

## 2020-12-18 NOTE — BH INPATIENT PSYCHIATRY PROGRESS NOTE - MSE UNSTRUCTURED FT
woman, sitting in bed, appears disheveled, fair grooming, pleasant, minimally cooperative with interview, appropriate eye contact. Motor: No psychomotor abnormalities. Speech: Normal rate/volume/tone. Poverty of speech, at times mumbled, difficult to assess due to use of . Mood: "pretty good" Affect: euthymic, oddly related, smiles inappropriately TP: Grossly linear, able to answer direct questions, difficult to assess due to POC TC: POC, No SI/HI/AH/VH elicited. Perceptions: internally preoccupied. Insight: poor. Judgement: poor. Attention: fair. Gait: normal.

## 2020-12-18 NOTE — BH INPATIENT PSYCHIATRY PROGRESS NOTE - CASE SUMMARY
Pt continues to minimize or deny all symptoms or events leading to admission.  Pt known to have poor insight.  Continue meds as ordered for now.

## 2020-12-18 NOTE — BH INPATIENT PSYCHIATRY PROGRESS NOTE - NSBHASSESSSUMMFT_PSY_ALL_CORE
Assessment: 46yo mandarin-speaking woman w/ schizoaffective disorder admitted for agitation, aggression in context of medication non-adherence, presentation on admission c/w psychosis. Today, patient remains in good behavioral control, speech disorganized, appears internally preoccupied, still with limited insight into illness or admission.     Plan:  - Patient requires inpatient admission for safety and stabilization.  - Continue home meds. Encourage medication compliance.   - Psych, standing: Depakote 500mg twice daily, klonopin 1mg once daily, olanzapine 15mg once daily, and invega sustenna 234mg RAMOS monthly  - Psych, PRNs: Haldol 7.5 mg PO/IM, Ativan 3 mg PO/IM, Benadryl 50 PO/IM PRN aggression/agitation.  - Medical: NTD  - Substance: NTD  - Legal: 2PC  - Safety: Patient is risk for aggression. At this time does not merit CO. Will continue to monitor.  - Dispo: pending sxs improvement.   - Collateral.

## 2020-12-18 NOTE — BH TREATMENT PLAN - NSTXPSYCHOINTERMD_PSY_ALL_CORE
Assessment: 44yo Chinese woman with PPHx of schizoaffective disorder admitted for agitation, aggression and medication non-adherence, presentation on admission c/w psychosis. Patient is likely decompensation in setting of medication non-adherence, as per collateral patient may not have been taking medication as prescribed.      Plan:  - Admit to 2N  - Patient requires inpatient admission for safety and stabilization.  - Continue home meds. Encourage medication compliance.   - Psych, standing: Depakote 500mg twice daily, klonopin 1mg once daily, olanzapine 15mg once daily, and invega sustenna 234mg RAMOS monthly  - Psych, PRNs: Haldol 7.5 mg PO/IM, Ativan 3 mg PO/IM, Benadryl 50 PO/IM PRN aggression/agitation.  - Medical: NTD  - Substance: NTD  - Legal: 2PC  - Safety: Patient is risk for aggression. At this time does not merit CO. Will continue to monitor.  - Dispo: pending sxs improvement.   - Collateral.  Medication management

## 2020-12-18 NOTE — BH INPATIENT PSYCHIATRY PROGRESS NOTE - NSBHFUPINTERVALHXFT_PSY_A_CORE
Chart reviewed, and case discussed with treatment team. No acute overnight events reported. Slept well per sleep log. Required no PRN over past 24 hours.   Patient seen this afternoon in her bedroom by writer. Interview conducted with [employer] , id #[id number] (Benjamin). Of note, history limited as  reported patient responses often mumbled, and  could not tell at times if she was speaking in Mandarin. Pt reports feeling "pretty good." Denies depressed mood, anxiety, SI/HI, AH/VH. No issues with peers or staff. No complaints.

## 2020-12-19 LAB
AMPHET UR-MCNC: NEGATIVE — SIGNIFICANT CHANGE UP
BARBITURATES, URINE.: NEGATIVE — SIGNIFICANT CHANGE UP
BENZODIAZ UR-MCNC: NEGATIVE — SIGNIFICANT CHANGE UP
COCAINE METAB.OTHER UR-MCNC: NEGATIVE — SIGNIFICANT CHANGE UP
CREATININE, URINE THERAPEUTIC: 112.7 MG/DL — SIGNIFICANT CHANGE UP
METHADONE UR-MCNC: NEGATIVE — SIGNIFICANT CHANGE UP
METHAQUALONE UR QL: NEGATIVE — SIGNIFICANT CHANGE UP
METHAQUALONE UR-MCNC: NEGATIVE — SIGNIFICANT CHANGE UP
OPIATES UR-MCNC: NEGATIVE — SIGNIFICANT CHANGE UP
PCP UR-MCNC: NEGATIVE — SIGNIFICANT CHANGE UP
PROPOXYPH UR QL: NEGATIVE — SIGNIFICANT CHANGE UP
THC UR QL: NEGATIVE — SIGNIFICANT CHANGE UP

## 2020-12-19 PROCEDURE — 99231 SBSQ HOSP IP/OBS SF/LOW 25: CPT

## 2020-12-19 RX ADMIN — OLANZAPINE 15 MILLIGRAM(S): 15 TABLET, FILM COATED ORAL at 21:26

## 2020-12-19 RX ADMIN — Medication 1 MILLIGRAM(S): at 08:38

## 2020-12-19 RX ADMIN — DIVALPROEX SODIUM 500 MILLIGRAM(S): 500 TABLET, DELAYED RELEASE ORAL at 08:38

## 2020-12-19 RX ADMIN — DIVALPROEX SODIUM 500 MILLIGRAM(S): 500 TABLET, DELAYED RELEASE ORAL at 21:25

## 2020-12-19 RX ADMIN — Medication 1 MILLIGRAM(S): at 21:26

## 2020-12-19 NOTE — BH INPATIENT PSYCHIATRY PROGRESS NOTE - NSBHFUPINTERVALHXFT_PSY_A_CORE
No overnight events.   [id number].  Pt reports she is sleeping and eating well.  Sometimes hears things when she is waking up from sleep.

## 2020-12-19 NOTE — BH INPATIENT PSYCHIATRY PROGRESS NOTE - MSE UNSTRUCTURED FT
Pt found sitting in day room.  Calm.  Speech laconic.  Mood is "ok," affect neutral.  [place] TP, limited associations.  Poverty of TC, but denies SIIP or HIIP.  Insight limited, judgment fair on interview, language fluent, gait intact.  Attention fair.

## 2020-12-20 PROCEDURE — 99231 SBSQ HOSP IP/OBS SF/LOW 25: CPT

## 2020-12-20 RX ADMIN — Medication 1 MILLIGRAM(S): at 20:19

## 2020-12-20 RX ADMIN — OLANZAPINE 15 MILLIGRAM(S): 15 TABLET, FILM COATED ORAL at 20:20

## 2020-12-20 RX ADMIN — DIVALPROEX SODIUM 500 MILLIGRAM(S): 500 TABLET, DELAYED RELEASE ORAL at 20:19

## 2020-12-20 RX ADMIN — DIVALPROEX SODIUM 500 MILLIGRAM(S): 500 TABLET, DELAYED RELEASE ORAL at 08:22

## 2020-12-20 RX ADMIN — Medication 1 MILLIGRAM(S): at 08:22

## 2020-12-20 NOTE — BH INPATIENT PSYCHIATRY PROGRESS NOTE - MSE UNSTRUCTURED FT
Pt found resting in room.  Calm.  No psychomotor slowing or agitation.  Speech regular rate.  Mood is "good," affect neutral.  [place] TP.  Limited associations.  TC: Denies AVH, SIIP, HIIP.  Insight limited, judgment fair on interview, language fluent, gait intact.

## 2020-12-20 NOTE — BH INPATIENT PSYCHIATRY PROGRESS NOTE - NSBHFUPINTERVALHXFT_PSY_A_CORE
No overnight events.   [id number].  Pt reports she is feeling good and calm.  No other questions or complaints today.

## 2020-12-21 PROCEDURE — 99231 SBSQ HOSP IP/OBS SF/LOW 25: CPT

## 2020-12-21 RX ADMIN — OLANZAPINE 15 MILLIGRAM(S): 15 TABLET, FILM COATED ORAL at 20:31

## 2020-12-21 RX ADMIN — Medication 1 MILLIGRAM(S): at 20:31

## 2020-12-21 RX ADMIN — Medication 1 MILLIGRAM(S): at 08:12

## 2020-12-21 RX ADMIN — DIVALPROEX SODIUM 500 MILLIGRAM(S): 500 TABLET, DELAYED RELEASE ORAL at 20:31

## 2020-12-21 RX ADMIN — DIVALPROEX SODIUM 500 MILLIGRAM(S): 500 TABLET, DELAYED RELEASE ORAL at 08:12

## 2020-12-21 NOTE — BH INPATIENT PSYCHIATRY PROGRESS NOTE - CASE SUMMARY
Pt continues to deny all symptoms.  In behavioral control.  Suspect mostly negative sx of psychosis at this point.  Continue meds.

## 2020-12-21 NOTE — BH INPATIENT PSYCHIATRY PROGRESS NOTE - NSBHFUPINTERVALHXFT_PSY_A_CORE
No overnight events. No PRNs over past 24 hours. Patient compliant with medications.   #[id number].  Pt reports she is feeling "very good."  No other questions or complaints today. Denies SI/HI, AH/VH. No pain/constipation.

## 2020-12-21 NOTE — BH INPATIENT PSYCHIATRY PROGRESS NOTE - MSE UNSTRUCTURED FT
Pt found in day room. Calm.  No psychomotor slowing or agitation.  Speech regular rate.  Mood is "very good," affect neutral.  [place] TP.  Limited associations.  TC: Denies AVH, SIIP, HIIP.  Insight limited, judgment fair on interview, language fluent, gait intact.

## 2020-12-22 PROCEDURE — 99231 SBSQ HOSP IP/OBS SF/LOW 25: CPT

## 2020-12-22 RX ORDER — CLONAZEPAM 1 MG
1 TABLET ORAL
Refills: 0 | Status: DISCONTINUED | OUTPATIENT
Start: 2020-12-22 | End: 2020-12-28

## 2020-12-22 RX ORDER — HALOPERIDOL DECANOATE 100 MG/ML
7.5 INJECTION INTRAMUSCULAR ONCE
Refills: 0 | Status: DISCONTINUED | OUTPATIENT
Start: 2020-12-22 | End: 2021-01-14

## 2020-12-22 RX ORDER — HALOPERIDOL DECANOATE 100 MG/ML
7.5 INJECTION INTRAMUSCULAR EVERY 4 HOURS
Refills: 0 | Status: DISCONTINUED | OUTPATIENT
Start: 2020-12-22 | End: 2021-01-14

## 2020-12-22 RX ADMIN — Medication 1 MILLIGRAM(S): at 20:22

## 2020-12-22 RX ADMIN — Medication 1 MILLIGRAM(S): at 08:12

## 2020-12-22 RX ADMIN — DIVALPROEX SODIUM 500 MILLIGRAM(S): 500 TABLET, DELAYED RELEASE ORAL at 08:12

## 2020-12-22 RX ADMIN — OLANZAPINE 15 MILLIGRAM(S): 15 TABLET, FILM COATED ORAL at 20:22

## 2020-12-22 RX ADMIN — DIVALPROEX SODIUM 500 MILLIGRAM(S): 500 TABLET, DELAYED RELEASE ORAL at 20:23

## 2020-12-22 NOTE — BH INPATIENT PSYCHIATRY PROGRESS NOTE - MSE UNSTRUCTURED FT
Pt found in day room. Calm, cooperative  No psychomotor slowing or agitation.  Speech regular rate, soft volume, mumbled.  Mood is "very good," affect neutral.  [place] TP.  Limited associations.  TC: POC. Denies AVH, SIIP, HIIP.  Insight limited, judgment fair on interview, language fluent, gait intact.

## 2020-12-22 NOTE — BH INPATIENT PSYCHIATRY PROGRESS NOTE - NSBHFUPINTERVALHXFT_PSY_A_CORE
No overnight events. No PRNs over past 24 hours. Patient compliant with medications.   #[id number] (Dena).  Pt states "I feel great today." No other questions or complaints. Denies SI/HI.

## 2020-12-22 NOTE — BH INPATIENT PSYCHIATRY PROGRESS NOTE - CASE SUMMARY
Pt continues to deny all symptoms.  In behavioral control.  Suspect mostly negative sx of psychosis at this point.  Continue meds.   Pt in behavioral control.  Continue meds as ordered.

## 2020-12-23 RX ADMIN — Medication 1 MILLIGRAM(S): at 08:06

## 2020-12-23 RX ADMIN — DIVALPROEX SODIUM 500 MILLIGRAM(S): 500 TABLET, DELAYED RELEASE ORAL at 20:27

## 2020-12-23 RX ADMIN — DIVALPROEX SODIUM 500 MILLIGRAM(S): 500 TABLET, DELAYED RELEASE ORAL at 08:06

## 2020-12-23 RX ADMIN — Medication 1 MILLIGRAM(S): at 20:26

## 2020-12-23 RX ADMIN — OLANZAPINE 15 MILLIGRAM(S): 15 TABLET, FILM COATED ORAL at 20:26

## 2020-12-23 NOTE — BH INPATIENT PSYCHIATRY PROGRESS NOTE - MSE UNSTRUCTURED FT
Pt found walking in day room, appears disheveled, dressed appropriately, pleasant, calm, cooperative. Motor: psychomotor slowing.  Speech regular rate, soft volume, regular tone.  Mood is "very good." Affect neutral, flat. TP concrete, limited associations.  TC: POC. Denies AVH, SI/HI.  Insight limited, judgment fair on interview, language fluent, gait intact.

## 2020-12-23 NOTE — BH INPATIENT PSYCHIATRY PROGRESS NOTE - MODIFICATIONS
Modifications were made to above resident note.  Patient interviewed and evaluated with resident present to follow up on symptoms and the case has been reviewed with the treatment team this morning.   I was physically present during the service to the patient and personally examined the patient and was directly involved in the management and recommendations of the care provided to the patient.  I have reviewed the resident's progress note and the mental status examination and I agree with its contents and made changes as indicated

## 2020-12-23 NOTE — BH INPATIENT PSYCHIATRY PROGRESS NOTE - CASE SUMMARY
Pt in behavioral control.  Continue meds as ordered. Patient remains acutely ill with symptoms as noted above and requires acute inpatient care for acute treatment of psychosis.   Patient does not require constant observation at this time and will follow with Q 20 minute checks. Will continue with treatment plan as described above and will follow for response.
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
## 2020-12-24 PROCEDURE — 99231 SBSQ HOSP IP/OBS SF/LOW 25: CPT

## 2020-12-24 RX ORDER — DIVALPROEX SODIUM 500 MG/1
1000 TABLET, DELAYED RELEASE ORAL AT BEDTIME
Refills: 0 | Status: DISCONTINUED | OUTPATIENT
Start: 2020-12-24 | End: 2020-12-24

## 2020-12-24 RX ORDER — DIVALPROEX SODIUM 500 MG/1
1000 TABLET, DELAYED RELEASE ORAL AT BEDTIME
Refills: 0 | Status: DISCONTINUED | OUTPATIENT
Start: 2020-12-24 | End: 2021-01-14

## 2020-12-24 RX ADMIN — DIVALPROEX SODIUM 500 MILLIGRAM(S): 500 TABLET, DELAYED RELEASE ORAL at 08:27

## 2020-12-24 RX ADMIN — Medication 1 MILLIGRAM(S): at 21:00

## 2020-12-24 RX ADMIN — OLANZAPINE 15 MILLIGRAM(S): 15 TABLET, FILM COATED ORAL at 21:00

## 2020-12-24 RX ADMIN — Medication 1 MILLIGRAM(S): at 08:31

## 2020-12-24 RX ADMIN — DIVALPROEX SODIUM 1000 MILLIGRAM(S): 500 TABLET, DELAYED RELEASE ORAL at 21:00
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
## 2020-12-24 NOTE — BH INPATIENT PSYCHIATRY PROGRESS NOTE - MODIFICATIONS
Patient interviewed and evaluated with resident present to follow up on symptoms and the case has been reviewed with the treatment team this morning.   I was physically present during the service to the patient and personally examined the patient and was directly involved in the management and recommendations of the care provided to the patient.  I have reviewed the resident's progress note and the mental status examination and I agree with its contents and made changes as indicated Patient interviewed and evaluated with resident present with Kaushik   to follow up on symptoms and the case has been reviewed with the treatment team this morning.   I was physically present during the service to the patient and personally examined the patient and was directly involved in the management and recommendations of the care provided to the patient.  I have reviewed the resident's progress note and the mental status examination and I agree with its contents and made changes as indicated

## 2020-12-24 NOTE — BH INPATIENT PSYCHIATRY PROGRESS NOTE - MSE UNSTRUCTURED FT
Pt found walking in day room, appears disheveled, dressed appropriately, pleasant, calm, cooperative, somnolent. Motor: psychomotor slowing.  Speech regular rate, soft volume, regular tone, mumbled and difficult to understand.  Mood is "very good." Affect neutral, flat. TP concrete, limited associations.  TC: POC. Denies AVH, SI/HI.  Insight limited, judgment fair on interview, language fluent, gait intact.
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
## 2020-12-24 NOTE — BH INPATIENT PSYCHIATRY PROGRESS NOTE - CASE SUMMARY
Patient remains acutely ill with symptoms as noted above and requires acute inpatient care for acute treatment of psychosis.   Patient does not require constant observation at this time and will follow with Q 20 minute checks. Will continue with treatment plan as described above and will follow for response.

## 2020-12-24 NOTE — BH INPATIENT PSYCHIATRY PROGRESS NOTE - NSBHFUPINTERVALHXFT_PSY_A_CORE
No overnight events. No PRNs over past 24 hours. Slept well per sleep log, awaking around 5 am. Patient seen by team in quiet room, history taken with Mandarin  #[id number]. Of note, patient appeared somnolent and per  was mumbling and poor historian. Pt states she feels "fine." Complains of being sleepy and wanting to sleep. Endorses good sleep and appetite. Denies depressed mood, anxiety, SI/HI, AH/VH. Denies pain/constipation.

## 2020-12-25 LAB
ALBUMIN SERPL ELPH-MCNC: 3.9 G/DL — SIGNIFICANT CHANGE UP (ref 3.3–5)
ALP SERPL-CCNC: 59 U/L — SIGNIFICANT CHANGE UP (ref 40–120)
ALT FLD-CCNC: 16 U/L — SIGNIFICANT CHANGE UP (ref 4–33)
ANION GAP SERPL CALC-SCNC: 15 MMOL/L — HIGH (ref 7–14)
AST SERPL-CCNC: 15 U/L — SIGNIFICANT CHANGE UP (ref 4–32)
BASOPHILS # BLD AUTO: 0.02 K/UL — SIGNIFICANT CHANGE UP (ref 0–0.2)
BASOPHILS NFR BLD AUTO: 0.3 % — SIGNIFICANT CHANGE UP (ref 0–2)
BILIRUB SERPL-MCNC: 0.3 MG/DL — SIGNIFICANT CHANGE UP (ref 0.2–1.2)
BUN SERPL-MCNC: 17 MG/DL — SIGNIFICANT CHANGE UP (ref 7–23)
CALCIUM SERPL-MCNC: 9.2 MG/DL — SIGNIFICANT CHANGE UP (ref 8.4–10.5)
CHLORIDE SERPL-SCNC: 106 MMOL/L — SIGNIFICANT CHANGE UP (ref 98–107)
CO2 SERPL-SCNC: 21 MMOL/L — LOW (ref 22–31)
CREAT SERPL-MCNC: 0.59 MG/DL — SIGNIFICANT CHANGE UP (ref 0.5–1.3)
EOSINOPHIL # BLD AUTO: 0.08 K/UL — SIGNIFICANT CHANGE UP (ref 0–0.5)
EOSINOPHIL NFR BLD AUTO: 1.2 % — SIGNIFICANT CHANGE UP (ref 0–6)
GLUCOSE SERPL-MCNC: 193 MG/DL — HIGH (ref 70–99)
HCT VFR BLD CALC: 36.2 % — SIGNIFICANT CHANGE UP (ref 34.5–45)
HGB BLD-MCNC: 11.3 G/DL — LOW (ref 11.5–15.5)
IANC: 4.17 K/UL — SIGNIFICANT CHANGE UP (ref 1.5–8.5)
IMM GRANULOCYTES NFR BLD AUTO: 0.9 % — SIGNIFICANT CHANGE UP (ref 0–1.5)
LYMPHOCYTES # BLD AUTO: 1.67 K/UL — SIGNIFICANT CHANGE UP (ref 1–3.3)
LYMPHOCYTES # BLD AUTO: 25.9 % — SIGNIFICANT CHANGE UP (ref 13–44)
MCHC RBC-ENTMCNC: 28.8 PG — SIGNIFICANT CHANGE UP (ref 27–34)
MCHC RBC-ENTMCNC: 31.2 GM/DL — LOW (ref 32–36)
MCV RBC AUTO: 92.1 FL — SIGNIFICANT CHANGE UP (ref 80–100)
MONOCYTES # BLD AUTO: 0.46 K/UL — SIGNIFICANT CHANGE UP (ref 0–0.9)
MONOCYTES NFR BLD AUTO: 7.1 % — SIGNIFICANT CHANGE UP (ref 2–14)
NEUTROPHILS # BLD AUTO: 4.17 K/UL — SIGNIFICANT CHANGE UP (ref 1.8–7.4)
NEUTROPHILS NFR BLD AUTO: 64.6 % — SIGNIFICANT CHANGE UP (ref 43–77)
NRBC # BLD: 0 /100 WBCS — SIGNIFICANT CHANGE UP
NRBC # FLD: 0 K/UL — SIGNIFICANT CHANGE UP
PLATELET # BLD AUTO: 241 K/UL — SIGNIFICANT CHANGE UP (ref 150–400)
POTASSIUM SERPL-MCNC: 4 MMOL/L — SIGNIFICANT CHANGE UP (ref 3.5–5.3)
POTASSIUM SERPL-SCNC: 4 MMOL/L — SIGNIFICANT CHANGE UP (ref 3.5–5.3)
PROT SERPL-MCNC: 6.3 G/DL — SIGNIFICANT CHANGE UP (ref 6–8.3)
RBC # BLD: 3.93 M/UL — SIGNIFICANT CHANGE UP (ref 3.8–5.2)
RBC # FLD: 12.8 % — SIGNIFICANT CHANGE UP (ref 10.3–14.5)
SODIUM SERPL-SCNC: 142 MMOL/L — SIGNIFICANT CHANGE UP (ref 135–145)
VALPROATE SERPL-MCNC: 79.8 UG/ML — SIGNIFICANT CHANGE UP (ref 50–100)
WBC # BLD: 6.46 K/UL — SIGNIFICANT CHANGE UP (ref 3.8–10.5)
WBC # FLD AUTO: 6.46 K/UL — SIGNIFICANT CHANGE UP (ref 3.8–10.5)

## 2020-12-25 RX ADMIN — OLANZAPINE 15 MILLIGRAM(S): 15 TABLET, FILM COATED ORAL at 21:25

## 2020-12-25 RX ADMIN — Medication 1 MILLIGRAM(S): at 21:24

## 2020-12-25 RX ADMIN — DIVALPROEX SODIUM 1000 MILLIGRAM(S): 500 TABLET, DELAYED RELEASE ORAL at 21:24

## 2020-12-25 RX ADMIN — Medication 1 MILLIGRAM(S): at 08:33

## 2020-12-26 RX ADMIN — OLANZAPINE 15 MILLIGRAM(S): 15 TABLET, FILM COATED ORAL at 21:22

## 2020-12-26 RX ADMIN — DIVALPROEX SODIUM 1000 MILLIGRAM(S): 500 TABLET, DELAYED RELEASE ORAL at 21:22

## 2020-12-26 RX ADMIN — Medication 1 MILLIGRAM(S): at 09:01

## 2020-12-26 RX ADMIN — Medication 1 MILLIGRAM(S): at 21:22

## 2020-12-27 RX ADMIN — OLANZAPINE 15 MILLIGRAM(S): 15 TABLET, FILM COATED ORAL at 21:05

## 2020-12-27 RX ADMIN — Medication 1 MILLIGRAM(S): at 21:05

## 2020-12-27 RX ADMIN — Medication 1 MILLIGRAM(S): at 08:57

## 2020-12-27 RX ADMIN — DIVALPROEX SODIUM 1000 MILLIGRAM(S): 500 TABLET, DELAYED RELEASE ORAL at 21:05

## 2020-12-28 PROCEDURE — 99232 SBSQ HOSP IP/OBS MODERATE 35: CPT

## 2020-12-28 RX ORDER — CLONAZEPAM 1 MG
1 TABLET ORAL AT BEDTIME
Refills: 0 | Status: DISCONTINUED | OUTPATIENT
Start: 2020-12-28 | End: 2020-12-29

## 2020-12-28 RX ORDER — CLONAZEPAM 1 MG
0.5 TABLET ORAL DAILY
Refills: 0 | Status: DISCONTINUED | OUTPATIENT
Start: 2020-12-28 | End: 2020-12-29

## 2020-12-28 RX ADMIN — OLANZAPINE 15 MILLIGRAM(S): 15 TABLET, FILM COATED ORAL at 20:59

## 2020-12-28 RX ADMIN — Medication 1 MILLIGRAM(S): at 08:09

## 2020-12-28 RX ADMIN — Medication 1 MILLIGRAM(S): at 20:59

## 2020-12-28 RX ADMIN — DIVALPROEX SODIUM 1000 MILLIGRAM(S): 500 TABLET, DELAYED RELEASE ORAL at 20:59
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
## 2020-12-28 NOTE — BH INPATIENT PSYCHIATRY PROGRESS NOTE - NSBHFUPINTERVALHXFT_PSY_A_CORE
No acute overnight events. Awoke at 4:45 AM per sleep log. No PRNs over past 24 hours. Patient compliant with medications. Patient seen this am by NP and resident MD, history taken with Mandarin  #[id number]. Of note, patient appeared somnolent and was intermittently falling asleep during interview. Pt states she feels "very good." When questioned, patient endorses feeling sleepy. She denies issues with sleep overnight. Patient endorses depressed mood, stating “I’m missing home.” She further states her home “changed recently,” but was unable to clarify where home was. Otherwise no complaints. Patient denies SI/HI/thoughts of self-harm. Denies AH/VH. Denies pain/constipation.

## 2020-12-28 NOTE — BH INPATIENT PSYCHIATRY PROGRESS NOTE - MSE UNSTRUCTURED FT
Pt found walking in day room, disheveled, dressed appropriately, calm, cooperative, appears lethargic, intermittently falling asleep during interview. Motor: psychomotor slowing.  Speech regular rate, soft volume, regular tone, at times mumbled.  Mood is "very good." Affect neutral, flat. TP concrete, limited associations.  TC: POC. Denies AVH, SI/HI.  Insight limited, judgment fair on interview, language fluent, gait intact.

## 2020-12-29 PROCEDURE — 99231 SBSQ HOSP IP/OBS SF/LOW 25: CPT

## 2020-12-29 RX ORDER — CLONAZEPAM 1 MG
1 TABLET ORAL AT BEDTIME
Refills: 0 | Status: DISCONTINUED | OUTPATIENT
Start: 2020-12-29 | End: 2021-01-05

## 2020-12-29 RX ORDER — CLONAZEPAM 1 MG
0.5 TABLET ORAL DAILY
Refills: 0 | Status: DISCONTINUED | OUTPATIENT
Start: 2020-12-29 | End: 2020-12-31

## 2020-12-29 RX ADMIN — Medication 0.5 MILLIGRAM(S): at 08:34

## 2020-12-29 RX ADMIN — Medication 1 MILLIGRAM(S): at 20:11

## 2020-12-29 RX ADMIN — OLANZAPINE 15 MILLIGRAM(S): 15 TABLET, FILM COATED ORAL at 20:10

## 2020-12-29 RX ADMIN — DIVALPROEX SODIUM 1000 MILLIGRAM(S): 500 TABLET, DELAYED RELEASE ORAL at 20:10

## 2020-12-29 NOTE — BH INPATIENT PSYCHIATRY PROGRESS NOTE - CASE SUMMARY
Pt continues to be in behavioral control.  Presentation on inpatient unit difficult to reconcile with conflicting collateral reports.  Pt appeared oversedated on unit recently, however is on less meds as per ACT team now (no oral invega supplementation on unit), also unclear how much actual noncompliance occurred.  If pt was as compliant as ACT team states she may have been, then pt should have not exhibited behavioral issues and would have been more sedated.  CR report of noncompliance with reduced med regimen could be consistent with increase in behavioral disturbances.  Continue to monitor.

## 2020-12-29 NOTE — BH INPATIENT PSYCHIATRY PROGRESS NOTE - NSBHFUPINTERVALHXFT_PSY_A_CORE
No acute overnight events. Slept well per sleep log. No PRNs over past 24 hours. Patient compliant with medications. Patient seen this am by writer and attending, history taken with Mandarin  #[id number]. History limited as patient reportedly mumbling. Patient states she feels "very good." Reports she is not as tired today. When asked if she needs help with anything, patient states, "no help me." Otherwise no complaints. Denies pain/constipation.

## 2020-12-29 NOTE — BH INPATIENT PSYCHIATRY PROGRESS NOTE - MSE UNSTRUCTURED FT
Pt found walking in day room, disheveled, dressed appropriately, calm, cooperative, is more awake today. Motor: psychomotor slowing.  Speech regular rate, soft volume, regular tone, at times mumbled.  Mood is "very good." Affect neutral, flat. TP concrete, limited associations.  TC: POC. Denies AVH, SI/HI.  Insight limited, judgment fair on interview, language fluent, gait intact.
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
## 2020-12-30 PROCEDURE — 99231 SBSQ HOSP IP/OBS SF/LOW 25: CPT

## 2020-12-30 RX ADMIN — Medication 1 MILLIGRAM(S): at 20:19

## 2020-12-30 RX ADMIN — DIVALPROEX SODIUM 1000 MILLIGRAM(S): 500 TABLET, DELAYED RELEASE ORAL at 20:19

## 2020-12-30 RX ADMIN — OLANZAPINE 15 MILLIGRAM(S): 15 TABLET, FILM COATED ORAL at 20:19

## 2020-12-30 RX ADMIN — Medication 0.5 MILLIGRAM(S): at 09:22

## 2020-12-30 NOTE — BH INPATIENT PSYCHIATRY PROGRESS NOTE - CASE SUMMARY
Pt continues to be in behavioral control.  Presentation on inpatient unit difficult to reconcile with conflicting collateral reports.  Pt appeared oversedated on unit recently, however is on less meds as per ACT team now (no oral invega supplementation on unit), also unclear how much actual noncompliance occurred.  If pt was as compliant as ACT team states she may have been, then pt should have not exhibited behavioral issues and would have been more sedated.  CR report of noncompliance with reduced med regimen could be consistent with increase in behavioral disturbances.  Continue to monitor.   Limited cooperation with interview, vague, minimizing - known to be baseline.  Pt continues to be in behavioral control.  Continue meds as ordered.

## 2020-12-30 NOTE — BH INPATIENT PSYCHIATRY PROGRESS NOTE - MSE UNSTRUCTURED FT
Pt found walking in day room, dressed in gowns, calm. Motor: psychomotor slowing.  Speech regular rate, soft volume, regular tone, at times mumbled.  Mood is "good." Affect neutral, flat. TP concrete, limited associations.  TC: POC. Denies AVH, SI/HI.  Insight limited, judgment fair on interview, language fluent, gait intact.

## 2020-12-30 NOTE — BH INPATIENT PSYCHIATRY PROGRESS NOTE - NSBHFUPINTERVALHXFT_PSY_A_CORE
No acute overnight events. No PRNs over past 24 hours. Patient compliant with medications. Patient seen by writer and attending, history taken with Mandarin  #[id number]. Patient states she feels "not so good." When asked to clarify, patient states "nothing really happened, just don't feel good." Denies pain, physical discomfort. Reports her mood is "great mood." Interview cut short as patient states, "I don't want to talk about it anymore" and walked away.
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
## 2020-12-31 LAB — VALPROATE SERPL-MCNC: 106.9 UG/ML — HIGH (ref 50–100)

## 2020-12-31 PROCEDURE — 99231 SBSQ HOSP IP/OBS SF/LOW 25: CPT

## 2020-12-31 RX ORDER — CLONAZEPAM 1 MG
0.5 TABLET ORAL DAILY
Refills: 0 | Status: DISCONTINUED | OUTPATIENT
Start: 2020-12-31 | End: 2020-12-31

## 2020-12-31 RX ADMIN — DIVALPROEX SODIUM 1000 MILLIGRAM(S): 500 TABLET, DELAYED RELEASE ORAL at 21:32

## 2020-12-31 RX ADMIN — OLANZAPINE 15 MILLIGRAM(S): 15 TABLET, FILM COATED ORAL at 21:32

## 2020-12-31 RX ADMIN — Medication 1 MILLIGRAM(S): at 21:32

## 2020-12-31 NOTE — BH INPATIENT PSYCHIATRY PROGRESS NOTE - NSBHFUPINTERVALHXFT_PSY_A_CORE
No acute overnight events. No PRNs over past 24 hours. Patient compliant with medications. Patient seen by writer and attending, history taken with Mandarin  #[id number]. Patient states she feels very well. Denies pain. No complaints.

## 2020-12-31 NOTE — BH INPATIENT PSYCHIATRY PROGRESS NOTE - MSE UNSTRUCTURED FT
Pt found walking in day room, dressed in gowns, calm. Motor: psychomotor slowing.  Speech regular rate, soft volume, regular tone, at times mumbled.  Mood is "very well." Affect neutral, flat. TP concrete, limited associations.  TC: POC. Denies AVH, SI/HI.  Insight limited, judgment fair on interview, language fluent, gait intact.
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
## 2020-12-31 NOTE — BH INPATIENT PSYCHIATRY PROGRESS NOTE - CASE SUMMARY
Limited cooperation with interview, vague, minimizing - known to be baseline.  Pt continues to be in behavioral control.  Continue meds as ordered. Pt in behavioral control.  No complaints.  Dispo planning.

## 2021-01-01 RX ADMIN — OLANZAPINE 15 MILLIGRAM(S): 15 TABLET, FILM COATED ORAL at 20:23

## 2021-01-01 RX ADMIN — DIVALPROEX SODIUM 1000 MILLIGRAM(S): 500 TABLET, DELAYED RELEASE ORAL at 20:23

## 2021-01-01 RX ADMIN — Medication 1 MILLIGRAM(S): at 20:24

## 2021-01-02 RX ADMIN — OLANZAPINE 15 MILLIGRAM(S): 15 TABLET, FILM COATED ORAL at 20:54

## 2021-01-02 RX ADMIN — DIVALPROEX SODIUM 1000 MILLIGRAM(S): 500 TABLET, DELAYED RELEASE ORAL at 20:54

## 2021-01-02 RX ADMIN — Medication 1 MILLIGRAM(S): at 20:55

## 2021-01-03 RX ADMIN — DIVALPROEX SODIUM 1000 MILLIGRAM(S): 500 TABLET, DELAYED RELEASE ORAL at 20:47

## 2021-01-03 RX ADMIN — Medication 1 MILLIGRAM(S): at 20:47

## 2021-01-03 RX ADMIN — OLANZAPINE 15 MILLIGRAM(S): 15 TABLET, FILM COATED ORAL at 20:47

## 2021-01-04 PROCEDURE — 99232 SBSQ HOSP IP/OBS MODERATE 35: CPT

## 2021-01-04 RX ORDER — OLANZAPINE 15 MG/1
1 TABLET, FILM COATED ORAL
Qty: 30 | Refills: 0
Start: 2021-01-04 | End: 2021-02-02

## 2021-01-04 RX ORDER — DIVALPROEX SODIUM 500 MG/1
2 TABLET, DELAYED RELEASE ORAL
Qty: 60 | Refills: 0
Start: 2021-01-04 | End: 2021-02-02

## 2021-01-04 RX ORDER — PALIPERIDONE 1.5 MG/1
3 TABLET, EXTENDED RELEASE ORAL AT BEDTIME
Refills: 0 | Status: DISCONTINUED | OUTPATIENT
Start: 2021-01-04 | End: 2021-01-14

## 2021-01-04 RX ORDER — OLANZAPINE 15 MG/1
1 TABLET, FILM COATED ORAL
Qty: 0 | Refills: 0 | DISCHARGE
Start: 2021-01-04

## 2021-01-04 RX ORDER — DIVALPROEX SODIUM 500 MG/1
2 TABLET, DELAYED RELEASE ORAL
Qty: 0 | Refills: 0 | DISCHARGE
Start: 2021-01-04

## 2021-01-04 RX ORDER — CLONAZEPAM 1 MG
1 TABLET ORAL
Qty: 0 | Refills: 0 | DISCHARGE
Start: 2021-01-04

## 2021-01-04 RX ORDER — CLONAZEPAM 1 MG
1 TABLET ORAL
Qty: 30 | Refills: 0
Start: 2021-01-04 | End: 2021-02-02

## 2021-01-04 RX ORDER — BENZTROPINE MESYLATE 1 MG
0.5 TABLET ORAL
Refills: 0 | Status: DISCONTINUED | OUTPATIENT
Start: 2021-01-04 | End: 2021-01-14

## 2021-01-04 RX ADMIN — Medication 1 MILLIGRAM(S): at 20:48

## 2021-01-04 RX ADMIN — Medication 0.5 MILLIGRAM(S): at 20:49

## 2021-01-04 RX ADMIN — PALIPERIDONE 3 MILLIGRAM(S): 1.5 TABLET, EXTENDED RELEASE ORAL at 20:49

## 2021-01-04 RX ADMIN — OLANZAPINE 15 MILLIGRAM(S): 15 TABLET, FILM COATED ORAL at 20:49

## 2021-01-04 RX ADMIN — DIVALPROEX SODIUM 1000 MILLIGRAM(S): 500 TABLET, DELAYED RELEASE ORAL at 20:50

## 2021-01-04 NOTE — BH DISCHARGE NOTE NURSING/SOCIAL WORK/PSYCH REHAB - PATIENT PORTAL LINK FT
You can access the FollowMyHealth Patient Portal offered by [facility] by registering at the following website: [email or web address]. By joining [facility]’s FollowMyHealth portal, you will also be able to view your health information using other applications (apps) compatible with our system.

## 2021-01-04 NOTE — BH DISCHARGE NOTE NURSING/SOCIAL WORK/PSYCH REHAB - NSBHREFERPURPOSE1_PSY_ALL_CORE
Please note, you also have Stephanie ANDERSEN, your workers name is and her number is/[facility]) Please note, you also have Stephanie ANDERSEN, your workers name is Germania Bakerley and she can be reached at [phone]./[facility]) Please note, you also have Stephanie ANDERSEN, your workers name is Germania Romeo and she can be reached at [phone]. Thank you./[facility]) Please note, you also have Stephanie ANDERSEN, your workers name is Germania Romeo and she can be reached at [phone]. Thank you./[facility])/Ambulatory Clinic

## 2021-01-04 NOTE — BH INPATIENT PSYCHIATRY PROGRESS NOTE - MSE UNSTRUCTURED FT
Pt found walking in day room, dressed in gowns, calm. Motor: psychomotor slowing.  Speech regular rate, soft volume, regular tone, at times mumbled.  Mood is "ok." Affect neutral, flat. TP concrete, limited associations.  TC: POC. Denies AVH, SI/HI.  Insight limited, judgment fair on interview, language fluent, gait intact. Pt found walking in day room, dressed in gowns, calm. Motor: psychomotor slowing.  Speech regular rate, loud volume, mumbled. At times incoherent. Unclear if speaking jibberish or speaking an unknown dialect. Mood is "I'm not well." Affect irritable, labile. TP concrete, limited associations. TC: POC, + paranoid delusions. Denies AVH, SI/HI.  Insight limited, judgment limited, language fluent, gait intact.

## 2021-01-04 NOTE — BH INPATIENT PSYCHIATRY DISCHARGE NOTE - NSDCMRMEDTOKEN_GEN_ALL_CORE_FT
clonazePAM 1 mg oral tablet: 1 tab(s) orally once a day (at bedtime)  divalproex sodium 500 mg oral tablet, extended release: 2 tab(s) orally once a day (at bedtime)  OLANZapine 15 mg oral tablet: 1 tab(s) orally once a day (at bedtime)   clonazePAM 1 mg oral tablet: 1 tab(s) orally once a day (at bedtime) MDD:1mg  clonazePAM 1 mg oral tablet: 1 tab(s) orally once a day (at bedtime)  divalproex sodium 500 mg oral tablet, extended release: 2 tab(s) orally once a day (at bedtime)  OLANZapine 15 mg oral tablet: 1 tab(s) orally once a day (at bedtime)  OLANZapine 15 mg oral tablet: 1 tab(s) orally once a day (at bedtime) MDD:15mg

## 2021-01-04 NOTE — BH SAFETY PLAN - WARNING SIGN 1
Patient refused the safety plan.  Patient is discharged back to her group home. Writer encouraged the patient to seek group home staff support if needed.  Hearing voices

## 2021-01-04 NOTE — BH INPATIENT PSYCHIATRY PROGRESS NOTE - NSBHROSSYSTEMS_PSY_ALL_CORE
Psychiatric

## 2021-01-04 NOTE — BH DISCHARGE NOTE NURSING/SOCIAL WORK/PSYCH REHAB - NSDCPRRECOMMEND_PSY_ALL_CORE
Rehabilitation staff recommends patient continue to demonstrate medication/treatment compliance for symptom management.
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
## 2021-01-04 NOTE — BH INPATIENT PSYCHIATRY PROGRESS NOTE - CASE SUMMARY
Pt in behavioral control.  No complaints.  Dispo planning. Pt after 18 days of being in behavioral control, became agitated, screaming nonsensically (Mandarin speaking staff present and confirms pt incoherent), and posturing aggressively.  Also reported vaguely this AM that there is someone trying to attack her from the shadows (but noted it was not in hospital or at [facility]).  Will continue admission for now and add back 3 mg po Invega for this evening.

## 2021-01-04 NOTE — BH INPATIENT PSYCHIATRY DISCHARGE NOTE - HPI (INCLUDE ILLNESS QUALITY, SEVERITY, DURATION, TIMING, CONTEXT, MODIFYING FACTORS, ASSOCIATED SIGNS AND SYMPTOMS)
44yo Chinese woman, Mandarin-speaking, domiciled at [facility], PMHx of allergic rhinitis, GERD, and a PPHx of schizoaffective disorder, multiple hospitalizations including a 440-day hospitalization from 4/2016-6/2017, most recently hospitalized in 1/2020 for 1 month, admitted from ED to [facility] on 9.27 status for agitation, aggression and medication non-adherence. Patient seen in her room with treatment team. Of note, patient was a poor historian and history is limited. Patient states she is here for a "physical checkup of my entire body." When asked what is wrong with her body, patient states, "nothing is wrong, I don't know why I am here." When asked where she is, patient states, "a medical hospital, but I'm not sure the type." Per , patient was making statements that did not make sense, such as, "safe hospital inspection." Patient smiled and laughed unprompted during interview. When asked what was funny she said "nothing." Patient denies issues and aggression at her group home. Denies depression SI/HI. Denies acute psych ros. Patient gave permission to contact collateral. At this point, patient stated "no more questions."    See  Chart Note filed on this date for additional collateral.

## 2021-01-04 NOTE — BH INPATIENT PSYCHIATRY PROGRESS NOTE - NSBHFUPINTERVALHXFT_PSY_A_CORE
No acute overnight events. No PRNs over past 24 hours. Did not sleep well per sleep log. Patient compliant with medications. Patient seen by writer and attending, history taken with Mandarin  #[id number]. Patient states she feels well, no complaints. When told patient is going back to group home today, patient makes vague statement about concerns about "someone trying to hurt me always hiding in the shadow." When questioned, she states she feels safe returning to the group home. Denies SI/HI. Denies hallucinations.  No acute overnight events. No PRNs over past 24 hours. Did not sleep well per sleep log. Patient compliant with medications. Patient seen by writer and attending, history taken with Mandarin  #[id number]. Patient states she feels well, no complaints. When told patient the plan to go back to her group home today, patient maked a vague statement about concerns about "someone trying to hurt me, always hiding in the shadow." When questioned, she states she feels safe returning to the group home. Denies SI/HI. Denies hallucinations.     Later, patient refused to be discharged. Patient seen by writer and staff. Patient refusing to go home, stating to group specialist in [facility], "I'm not well, I am not recovered. I need to stay here." Patient became very agitated and aggressive toward staff member, and was seen raising her fist and waiving it at staff member.

## 2021-01-04 NOTE — BH DISCHARGE NOTE NURSING/SOCIAL WORK/PSYCH REHAB - NSDCPRGOAL_PSY_ALL_CORE
Pt’s psychiatric rehabilitation goal was addressed during this psychiatric hospitalization. Pt’s psych rehab goal was demonstrating mediation compliance and identify benefits of compliance. Pt has been compliance with medications. Pt is able to identify benefits of compliance as “staying calm”. Overall, pt has demonstrated improvement in sxs. Pt’s thought process is more organized. Pt is in good behavioral control on the unit. Pt is able to maintain ADLs appropriately. However, pt remains isolative. Pt attended 2 psych rehab groups during this hospitalization. Pt refused safety plan despite encouragement by writer. Pt is discharged back to her group home. Writer encouraged pt to seek help from group home staff if needed. Pt was also encouraged to continue working on medication compliance and verbalize feelings/concerns.  Pt’s psychiatric rehabilitation goal was addressed during this psychiatric hospitalization. Pt‘s psychiatric rehabilitation goal was demonstrating medication compliance as well as identify 1-2 benefits of compliance. Pt has complied with medications during this hospitalization. However, pt requires assistance with identifying personally relevant benefits of medication compliance such as decreasing voices, having better appetite and sleep. Pt reported that she is recovered and she is ready to be discharged. Pt was able to complete some part of safety plan with writer’s engagement. Overall, pt has demonstrated good behavioral control on the unit. Pt’s thought process is more organized. Pt is calmer. However, pt remains isolative due to language barriers. Pt is visible on the unit, watching TV or pacing. Pt attended several groups. Pt was unable to tolerate duration of groups.

## 2021-01-05 PROCEDURE — 99232 SBSQ HOSP IP/OBS MODERATE 35: CPT

## 2021-01-05 RX ORDER — OLANZAPINE 15 MG/1
20 TABLET, FILM COATED ORAL AT BEDTIME
Refills: 0 | Status: DISCONTINUED | OUTPATIENT
Start: 2021-01-05 | End: 2021-01-14

## 2021-01-05 RX ORDER — CLONAZEPAM 1 MG
1 TABLET ORAL AT BEDTIME
Refills: 0 | Status: DISCONTINUED | OUTPATIENT
Start: 2021-01-05 | End: 2021-01-12

## 2021-01-05 RX ADMIN — Medication 0.5 MILLIGRAM(S): at 08:38

## 2021-01-05 RX ADMIN — OLANZAPINE 20 MILLIGRAM(S): 15 TABLET, FILM COATED ORAL at 21:10

## 2021-01-05 RX ADMIN — DIVALPROEX SODIUM 1000 MILLIGRAM(S): 500 TABLET, DELAYED RELEASE ORAL at 21:10

## 2021-01-05 RX ADMIN — Medication 1 MILLIGRAM(S): at 21:10

## 2021-01-05 RX ADMIN — Medication 0.5 MILLIGRAM(S): at 21:10

## 2021-01-05 RX ADMIN — PALIPERIDONE 3 MILLIGRAM(S): 1.5 TABLET, EXTENDED RELEASE ORAL at 21:10

## 2021-01-05 NOTE — BH INPATIENT PSYCHIATRY PROGRESS NOTE - CASE SUMMARY
Pt after 18 days of being in behavioral control, became agitated, screaming nonsensically (Mandarin speaking staff present and confirms pt incoherent), and posturing aggressively.  Also reported vaguely this AM that there is someone trying to attack her from the shadows (but noted it was not in hospital or at [facility]).  Will continue admission for now and add back 3 mg po Invega for this evening. Continues to have suspected delusions but may be chronic.  Titrating antipsychotic for behavioral control.

## 2021-01-05 NOTE — BH INPATIENT PSYCHIATRY PROGRESS NOTE - NSBHFUPINTERVALHXFT_PSY_A_CORE
No acute overnight events. No PRNs over past 24 hours. Patient compliant with medications. Patient seen by writer and attending, history taken with Mandarin  #[id number]. History limited. Patient reports "feeling very good." No complaints. When asked about her refusal to be discharged to home yesterday, patient states, "my illness is not cured yet" and "my father told me that my diseases would be cured in two months." When team explained that care could be continued at home, patient states, "I don't know how to manage my medication." Team explained that writer would have help with medications, to which patient replied a vague, incoherent statement per , "my  is not home...in hospital being a doctor."

## 2021-01-05 NOTE — BH INPATIENT PSYCHIATRY PROGRESS NOTE - MSE UNSTRUCTURED FT
Pt found in bed, dressed in gowns, calm. Motor: psychomotor slowing.  Speech regular rate, loud volume, mumbled. At times incoherent. Unclear if speaking jibberish or speaking an unknown dialect. Mood is "very good." Affect neutral, restricted. TP concrete, limited associations. TC: POC, +delusions. No AVH, SI/HI elicited.  Insight limited, judgment limited, language fluent, gait intact.

## 2021-01-06 PROCEDURE — 99231 SBSQ HOSP IP/OBS SF/LOW 25: CPT

## 2021-01-06 RX ADMIN — DIVALPROEX SODIUM 1000 MILLIGRAM(S): 500 TABLET, DELAYED RELEASE ORAL at 20:21

## 2021-01-06 RX ADMIN — Medication 1 MILLIGRAM(S): at 20:22

## 2021-01-06 RX ADMIN — PALIPERIDONE 3 MILLIGRAM(S): 1.5 TABLET, EXTENDED RELEASE ORAL at 20:21

## 2021-01-06 RX ADMIN — Medication 0.5 MILLIGRAM(S): at 08:57

## 2021-01-06 RX ADMIN — OLANZAPINE 20 MILLIGRAM(S): 15 TABLET, FILM COATED ORAL at 20:21

## 2021-01-06 RX ADMIN — Medication 0.5 MILLIGRAM(S): at 20:22

## 2021-01-06 NOTE — BH SCALES AND SCREENS - NSBPRSBLUNTAFFECT_PSY_ALL_CORE
5 = Moderately Severe – e.g., flattening of affect, including at least two of the three features, severe lack of facial expression, monotonous voice, or restricted body gestures
7 = Very Severe – e.g., totally monotonous voice, and total lack of expressive gestures throughout the evaluation
Medical Necessity Information: It is in your best interest to select a reason for this procedure from the list below. All of these items fulfill various CMS LCD requirements except the new and changing color options.
Detail Level: Detailed
Include Z78.9 (Other Specified Conditions Influencing Health Status) As An Associated Diagnosis?: Yes
Anesthesia Volume In Cc: 0
Medical Necessity Clause: This procedure was medically necessary because the lesions that were treated were:
Consent: Written consent obtained and the risks of skin tag removal was reviewed with the patient including but not limited to bleeding, pigmentary change, infection, pain, and remote possibility of scarring.

## 2021-01-06 NOTE — BH INPATIENT PSYCHIATRY PROGRESS NOTE - MSE UNSTRUCTURED FT
Pt found in day room watching tv, dressed in gowns, calm. Motor: psychomotor slowing.  Speech regular rate, loud volume, mumbled. At times incoherent. Unclear if speaking jibberish or speaking an unknown dialect. Mood is "good." Affect neutral, slightly irritable, restricted. TP concrete, limited associations. TC: POC. No AVH, SI/HI elicited.  Insight limited, judgment limited, language fluent, gait intact.

## 2021-01-06 NOTE — BH INPATIENT PSYCHIATRY PROGRESS NOTE - NSBHFUPINTERVALHXFT_PSY_A_CORE
No acute overnight events. No PRNs over past 24 hours. Patient compliant with medications. No behavioral issues. Patient seen by writer and attending, history taken with Mandarin  #[id number]. History limited. Patient reports feeling " good." No complaints. Reports adequate sleep and appetite. Denies medication side effects. When discussing discharge back to [facility] home, patient states, "I've been taking medication long time" and walks away.

## 2021-01-06 NOTE — BH INPATIENT PSYCHIATRY PROGRESS NOTE - CASE SUMMARY
Continues to have suspected delusions but may be chronic.  Titrating antipsychotic for behavioral control. Resistant to interview today.  In behavioral control.  COntinue meds as ordered.

## 2021-01-06 NOTE — BH SCALES AND SCREENS - NSBPRSCONCDISORG_PSY_ALL_CORE
6 = Severe - formal thought disorder is present for most of the interview, and the interview is severely strained

## 2021-01-06 NOTE — BH INPATIENT PSYCHIATRY PROGRESS NOTE - NSBHASSESSSUMMFT_PSY_ALL_CORE
44 yo Chinese woman, Mandarin-speaking, domiciled at [facility], PMHx of allergic rhinitis, GERD, and a PPHx of schizoaffective disorder, multiple hospitalizations including a 440-day hospitalization from 4/2016-6/2017, most recently hospitalized in 1/2020 for 1 month. She is brought to the hospital by EMS activated by her residence for agitation, aggression (punched a peer and went to throw a walker) and medication non-adherence. Per ACT team, pt was taking the following: Zyprexa 15 mg qhs, Depakote 1000 mg daily, Invega Trinza 819 mg IM every 3 months (last given 12/15), Invega 6 mg qhs, Klonopin 1 mg daily, Cogentin 0.5 mg bid, bromocriptine 5 mg bid (for galactorrhea).  ACT team states group home had not given her Invega oral dose for some time.  ACT team states she only refuses doses occasionally, whereas group home reports she is largely noncompliant with all oral doses. On unit has been largely in good behavioral control, keeping to self, calm, quiet, limited interviews due to likely negative symptoms (no affect, limited motivation). Had planned for patient discharge this week, but patient became irritable, paranoid and agitated. Patient is known to be chronically psychotic, however given her increase in reported delusional TC, her home Invega was restarted and olanzapine was uptitrated. Patient is tolerating without side effects. Will continue to monitor on current medication regiment and begin discharge planning.    PLAN:  - Patient continues to require admission for safety and stabilization.  - C/w Olanzapine to 20 mg PO QHS (inc 1/5--)  - C/w Invega 3 mg QHS (will titrate to home dose of Invega 6 mg QHS) (1/4--)  - C/w Invega Trinza 819 mg IM every 3 months (last given 12/15)  - Restart home Cogentin 0.5 mg bid (1/4--)  - Continue Clonazepam 1 mg QHS   - Continue Divalproex DR 1000 mg PO QHS  - Hold home bromocriptine 5 mg bid (for galactorrhea) for now given no reported sxs.   - Psych, PRNs: Haldol 7.5 mg PO, Lorazepam 3 mg PO, Diphenhydramine 50 mg PO q4 hrs  - Labs: Valproic Acid Level was 108 H (12/31) <--  79.80 (12/25). CBC w/ diff, LFTs (12/25) stable  - Medical: NTD  - Substance: NTD  - Safety: Routine obs  - Legal: 9.27 (involuntary)  - Dispo: d/w social work. To group home with ACT.

## 2021-01-06 NOTE — BH SCALES AND SCREENS - NSBHSCALESCRN_PSY_ALL_CORE
Brief Psychiatric Rating Scale (BPRS)

## 2021-01-07 PROCEDURE — 99231 SBSQ HOSP IP/OBS SF/LOW 25: CPT

## 2021-01-07 RX ADMIN — OLANZAPINE 20 MILLIGRAM(S): 15 TABLET, FILM COATED ORAL at 20:25

## 2021-01-07 RX ADMIN — DIVALPROEX SODIUM 1000 MILLIGRAM(S): 500 TABLET, DELAYED RELEASE ORAL at 20:25

## 2021-01-07 RX ADMIN — Medication 0.5 MILLIGRAM(S): at 20:25

## 2021-01-07 RX ADMIN — PALIPERIDONE 3 MILLIGRAM(S): 1.5 TABLET, EXTENDED RELEASE ORAL at 20:25

## 2021-01-07 RX ADMIN — Medication 0.5 MILLIGRAM(S): at 09:06

## 2021-01-07 RX ADMIN — Medication 1 MILLIGRAM(S): at 20:25

## 2021-01-07 NOTE — BH INPATIENT PSYCHIATRY PROGRESS NOTE - MSE UNSTRUCTURED FT
Pt found resting in day room watching tv, dressed in gowns, calm. Motor: psychomotor slowing.  Speech regular rate and volume, mumbled. At times incoherent. Unclear if speaking jibberish or speaking an unknown dialect. Mood is "very good." Affect neutral, pleasant, restricted. TP concrete, limited associations. TC: POC. No AVH, SI/HI elicited.  Insight limited, judgment limited, language fluent, gait intact.

## 2021-01-07 NOTE — BH TREATMENT PLAN - NSTXPATIENTAGREEMENT_PSY_ALL_CORE
Patient unable to participate

## 2021-01-07 NOTE — BH TREATMENT PLAN - NSCMSPTSTRENGTHS_PSY_ALL_CORE
Unable to obtain (specify)

## 2021-01-07 NOTE — BH TREATMENT PLAN - NSBHPRIMARYDX_PSY_ALL_CORE
Schizoaffective disorder    

## 2021-01-07 NOTE — BH TREATMENT PLAN - NSTXMEDICINTERRN_PSY_ALL_CORE
Meets goals with regards to medication compliance.
Meets goals with regards to medication compliance.

## 2021-01-07 NOTE — BH TREATMENT PLAN - NSTXMEDICGOAL_PSY_ALL_CORE
Take all medications as prescribed
Take all medications as prescribed
Be able to describe the benefit of medication/treatment
Take all medications as prescribed

## 2021-01-07 NOTE — BH INPATIENT PSYCHIATRY PROGRESS NOTE - CASE SUMMARY
Resistant to interview today.  In behavioral control.  COntinue meds as ordered.   Better related today but still limited interview.  In behavioral control.  Slept better last night.

## 2021-01-07 NOTE — BH TREATMENT PLAN - NSTXPSYCHOGOAL_PSY_ALL_CORE
Will be able to report experiencing hallucinations to staff
Will be able to report experiencing hallucinations to staff
Be able to utilize coping skills to ignore hallucinations so that he/she could attend and participate constructively in groups
Will be able to report experiencing hallucinations to staff

## 2021-01-07 NOTE — BH TREATMENT PLAN - NSTXPSYCHOINTERRN_PSY_ALL_CORE
Will encourage to report any hallucinations to staff.
Will encourage to report any hallucinations to staff.

## 2021-01-07 NOTE — BH TREATMENT PLAN - NSPTSTATEDGOAL_PSY_ALL_CORE
Pt states she is in the hospital for a physical checkup. 

## 2021-01-07 NOTE — BH TREATMENT PLAN - NSTXDCOPNOINTERSW_PSY_ALL_CORE
Pt will be encouraged to be compliant with medications and treatment in the hospital and community. Pt currently on ACT/AOT and resides in a CR level of care.
Pt will be encouraged to be compliant with medications and treatment in the hospital and community. Pt currently on ACT/AOT and resides in a CR level of care.
Writer will work with pt and outpatient team to facilitate a discharge patient is agreeable to.
Pt will be encouraged to be compliant with medications and treatment in the hospital and community. Pt currently on ACT/AOT and resides in a CR level of care.

## 2021-01-07 NOTE — BH TREATMENT PLAN - NSTXDCOPNOINTERRN_PSY_ALL_CORE
Pt will be encouraged to maintain compliance with medications and treatment while in the hospital and community. Will also be encouraged to get adequate sleep as she does tend to awake in the middle of the night.
Pt will be encouraged to maintain compliance with medications and treatment while in the hospital and community. Will also be encouraged to get adequate sleep as she does tend to awake in the middle of the night.

## 2021-01-07 NOTE — BH TREATMENT PLAN - NSTXMEDICINTERPR_PSY_ALL_CORE
Patient will engage in 1:1 interventions and psych rehab groups with psychiatric rehabilitation staff in order to demonstrate medication compliance for sustained recovery by day 7.
Pt will continue to demonstate medication compliance as well as identify 1-2 benefits of compliance. Psych rehab staff will meet patient daily and engage the patient through groups in order to promote process.
Pt met her psych rehab goal which is taking all prescribed medications. Pt will continue to demonstate medication compliance as well as identify 1-2 benefits of compliance. Psych rehab staff will meet patient daily and engage the patient through groups in order to promote process.
Pt will continue to demonstate medication compliance as well as identify 1-2 benefits of compliance. Psych rehab staff will meet patient daily and engage the patient through groups in order to promote process.

## 2021-01-07 NOTE — BH INPATIENT PSYCHIATRY PROGRESS NOTE - NSBHFUPINTERVALHXFT_PSY_A_CORE
No acute overnight events. No PRNs over past 24 hours. Patient compliant with medications. No behavioral issues. Patient seen by writer and attending, history taken with Mandarin  #[id number]. History limited. Patient reports feeling "very good." Reports adequate sleep and appetite. No complaints.

## 2021-01-07 NOTE — BH TREATMENT PLAN - NSTXCAREGIVERPARTICIPATE_PSY_P_CORE
Family/Caregiver participated in defining interventions
Family/Caregiver participated in development of after care plan
Family/Caregiver participated in identification of needs/problems/goals for treatment

## 2021-01-07 NOTE — BH INPATIENT PSYCHIATRY PROGRESS NOTE - NSBHASSESSSUMMFT_PSY_ALL_CORE
46 yo Chinese woman, Mandarin-speaking, domiciled at [facility], PMHx of allergic rhinitis, GERD, and a PPHx of schizoaffective disorder, multiple hospitalizations including a 440-day hospitalization from 4/2016-6/2017, most recently hospitalized in 1/2020 for 1 month. She is brought to the hospital by EMS activated by her residence for agitation, aggression (punched a peer and went to throw a walker) and medication non-adherence. Per ACT team, pt was taking the following: Zyprexa 15 mg qhs, Depakote 1000 mg daily, Invega Trinza 819 mg IM every 3 months (last given 12/15), Invega 6 mg qhs, Klonopin 1 mg daily, Cogentin 0.5 mg bid, bromocriptine 5 mg bid (for galactorrhea).  ACT team states group home had not given her Invega oral dose for some time.  ACT team states she only refuses doses occasionally, whereas group home reports she is largely noncompliant with all oral doses. On unit has been largely in good behavioral control, keeping to self, calm, quiet, limited interviews due to likely negative symptoms (no affect, limited motivation). Had planned for patient discharge this week, but patient became irritable, paranoid and agitated. Patient is known to be chronically psychotic, however given her increase in reported delusional TC, her home Invega was restarted and olanzapine was uptitrated. Patient is tolerating without side effects.     Today pt continues to be in good behavioral control. Will continue to monitor on current medication regiment and continue discharge planning.    PLAN:  - Patient continues to require admission for safety and stabilization.  - C/w Olanzapine to 20 mg PO QHS (inc 1/5--)  - C/w Invega 3 mg QHS (1/4--)  - C/w Invega Trinza 819 mg IM every 3 months (last given 12/15)  - C/w home Cogentin 0.5 mg bid (1/4--)  - C/w Clonazepam 1 mg QHS   - C/w Divalproex DR 1000 mg PO QHS  - Hold home bromocriptine 5 mg bid (for galactorrhea) for now given no reported sxs.   - Psych, PRNs: Haldol 7.5 mg PO, Lorazepam 3 mg PO, Diphenhydramine 50 mg PO q4 hrs  - Labs: Valproic Acid Level was 108 H (12/31) <--  79.80 (12/25). CBC w/ diff, LFTs (12/25) stable  - Medical: NTD  - Substance: NTD  - Safety: Routine obs  - Legal: 9.27 (involuntary)  - Dispo: d/w social work. To group home with ACT.

## 2021-01-07 NOTE — BH TREATMENT PLAN - NSTXPATIENTPARTICIPATE_PSY_ALL_CORE
No, patient unwilling to participate
No, patient unwilling to participate
Patient participated in identification of needs/problems/goals for treatment
No, patient unwilling to participate

## 2021-01-07 NOTE — BH TREATMENT PLAN - NSTXPLANTHERAPYSESSIONSFT_PSY_ALL_CORE
12-31-20  Type of therapy: Symptom management  Type of session: Individual  Level of patient participation: Participated with encouragement  Duration of participation: 15 minutes  Therapy conducted by: Psych rehab  Therapy Summary: Writer met with patient to review progress towards psych rehab goal. Pt is Mandarin speaking. Writer is fluent in Mandarin. Therefore, all of meetings are conducted in Mandarin. Pt demonstrated improvement in her psych rehab goal which is demonstrating medication compliance and identifying benefits of compliance. With writer’s prompt, pt was able to identify a benefit of complying with medications as keep her staying calm. As per nursing staff, pt refused to answer her mother’s phone call. Pt reported that her mother abandoned her so she does not want to answer her phone call. Overall, pt has demonstrated improvement in her sxs. Pt is calmer and more cooperative. Pt is in good behavioral control on the unit.  However, Pt is isolative on the unit. Pt did not attend any groups over the past week Pt stated that she is stupid so she can not do anything in groups. Writer introduced the groups that we offered on the unit. Pt agreed to give it try after writer’s encouragement.  
  12-17-20  Type of therapy: Coping skills,Medication management,Relapse prevention  Type of session: Individual  --  --  --  --  
  12-18-20  Type of therapy: Coping skills,Daily living skills,Medication management,Relapse prevention  Type of session: Individual  --  --  --  --    12-18-20  Type of therapy: Coping skills,Relapse prevention  Type of session: Individual  Level of patient participation: Not engaged  Duration of participation: Less than 15 minutes  Therapy conducted by: Nursing  --    12-20-20  Type of therapy: Coping skills  Type of session: Individual  Level of patient participation: Not engaged  Duration of participation: Less than 15 minutes  Therapy conducted by: Nursing  --    12-21-20  Type of therapy: Medication management  --  --  --  --  --    12-24-20  Type of therapy: Psychoeducation  Type of session: Individual  Level of patient participation: Participated with encouragement  Duration of participation: Less than 15 minutes  Therapy conducted by: Psych rehab  Therapy Summary: Writer met with patient to review progress towards psych rehab goal. Pt is Mandarin speaking. Writer is fluent in Mandarin. Therefore, all of meetings are conducted in Mandarin. Pt met her specific goal which is medication compliance.  Therefore, pt's goal was modified to 1-2 benefits of compliance for wellness and emotional regulation within 7 days. Overall, pt has demonstrated improvement in her sxs. Pt is calmer and more cooperative. Pt is in good behavioral control on the unit.  However, pt remains disorganized though process. Pt reported that she came to the [place] with many people via a “moving house” instead of a plane. Pt is isolative on the unit. Pt did not attend any groups over the past week despite multiple encouragement by writer. Writer encouraged the pt to continue to attend psych rehab groups for socialization and psychoeducation.  
  12-31-20  Type of therapy: Symptom management  Type of session: Individual  Level of patient participation: Participated with encouragement  Duration of participation: 15 minutes  Therapy conducted by: Psych rehab  Therapy Summary: Writer met with patient to review progress towards psych rehab goal. Pt is Mandarin speaking. Writer is fluent in Mandarin. Therefore, all of meetings are conducted in Mandarin. Pt demonstrated improvement in her psych rehab goal which is demonstrating medication compliance and identifying benefits of compliance. With writer’s prompt, pt was able to identify a benefit of complying with medications as keep her staying calm. As per nursing staff, pt refused to answer her mother’s phone call. Pt reported that her mother abandoned her so she does not want to answer her phone call. Overall, pt has demonstrated improvement in her sxs. Pt is calmer and more cooperative. Pt is in good behavioral control on the unit.  However, Pt is isolative on the unit. Pt did not attend any groups over the past week Pt stated that she is stupid so she can not do anything in groups. Writer introduced the groups that we offered on the unit. Pt agreed to give it try after writer’s encouragement.

## 2021-01-08 PROCEDURE — 99231 SBSQ HOSP IP/OBS SF/LOW 25: CPT

## 2021-01-08 RX ADMIN — OLANZAPINE 20 MILLIGRAM(S): 15 TABLET, FILM COATED ORAL at 21:30

## 2021-01-08 RX ADMIN — Medication 0.5 MILLIGRAM(S): at 21:30

## 2021-01-08 RX ADMIN — Medication 1 MILLIGRAM(S): at 21:29

## 2021-01-08 RX ADMIN — PALIPERIDONE 3 MILLIGRAM(S): 1.5 TABLET, EXTENDED RELEASE ORAL at 23:22

## 2021-01-08 RX ADMIN — DIVALPROEX SODIUM 1000 MILLIGRAM(S): 500 TABLET, DELAYED RELEASE ORAL at 21:31

## 2021-01-08 RX ADMIN — Medication 0.5 MILLIGRAM(S): at 09:14

## 2021-01-08 NOTE — BH INPATIENT PSYCHIATRY PROGRESS NOTE - CASE SUMMARY
Better related today but still limited interview.  In behavioral control.  Slept better last night. Appears to be delusional today, will continue with current medication plan, give more time with recent increases as she is now on high dose multiple antipsychotics.

## 2021-01-08 NOTE — BH INPATIENT PSYCHIATRY PROGRESS NOTE - NSBHASSESSSUMMFT_PSY_ALL_CORE
46 yo Chinese woman, Mandarin-speaking, domiciled at [facility], PMHx of allergic rhinitis, GERD, and a PPHx of schizoaffective disorder, multiple hospitalizations including a 440-day hospitalization from 4/2016-6/2017, most recently hospitalized in 1/2020 for 1 month. She is brought to the hospital by EMS activated by her residence for agitation, aggression (punched a peer and went to throw a walker) and medication non-adherence. Per ACT team, pt was taking the following: Zyprexa 15 mg qhs, Depakote 1000 mg daily, Invega Trinza 819 mg IM every 3 months (last given 12/15), Invega 6 mg qhs, Klonopin 1 mg daily, Cogentin 0.5 mg bid, bromocriptine 5 mg bid (for galactorrhea).  ACT team states group home had not given her Invega oral dose for some time.  ACT team states she only refuses doses occasionally, whereas group home reports she is largely noncompliant with all oral doses. On unit has been largely in good behavioral control, keeping to self, calm, quiet, limited interviews due to likely negative symptoms (no affect, limited motivation). Had planned for patient discharge this week, but patient became irritable, paranoid and agitated. Patient is known to be chronically psychotic, however given her increase in reported delusional TC, her home Invega was restarted and olanzapine was uptitrated. Patient is tolerating without side effects.     Today pt continues to be in good behavioral control. 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 Will continue to monitor on current medication regiment and continue discharge planning.    PLAN:  - Patient continues to require admission for safety and stabilization.  - C/w Olanzapine to 20 mg PO QHS (inc 1/5--)  - C/w Invega 3 mg QHS (1/4--)  - C/w Invega Trinza 819 mg IM every 3 months (last given 12/15)  - C/w home Cogentin 0.5 mg bid (1/4--)  - C/w Clonazepam 1 mg QHS   - C/w Divalproex DR 1000 mg PO QHS  - Hold home bromocriptine 5 mg bid (for galactorrhea) for now given no reported sxs.   - Psych, PRNs: Haldol 7.5 mg PO, Lorazepam 3 mg PO, Diphenhydramine 50 mg PO q4 hrs  - Labs: Valproic Acid Level was 108 H (12/31) <--  79.80 (12/25). CBC w/ diff, LFTs (12/25) stable  - Medical: NTD  - Substance: NTD  - Safety: Routine obs  - Legal: 9.27 (involuntary)  - Dispo: d/w social work. To group home with ACT.

## 2021-01-08 NOTE — BH INPATIENT PSYCHIATRY PROGRESS NOTE - MSE UNSTRUCTURED FT
Pt found resting in day room watching tv, dressed in gowns, calm. Motor: psychomotor slowing.  Speech regular rate and volume, mumbled. At times incoherent. Unclear if speaking jibberish or speaking an unknown dialect. Mood is "very good." Affect neutral, pleasant, restricted. TP concrete, limited associations. TC: POC. +delusions of being a nurse and father being a doctor at the hospital. +possible hallucinations(?) of seeing father last night. No AVH, SI/HI elicited.  Insight limited, judgment limited, language fluent, gait intact.

## 2021-01-08 NOTE — BH INPATIENT PSYCHIATRY PROGRESS NOTE - NSBHFUPINTERVALHXFT_PSY_A_CORE
No acute overnight events. No PRNs over past 24 hours. Patient compliant with medications. No behavioral issues. Patient seen by writer and attending, history taken with Mandarin  #[id number]. History limited. Patient reports feeling "very good." Reports adequate sleep and appetite. No complaints. Patient states she saw her father yesterday. States he told her to take more medication. When questioned, patient says "my father is a doctor. He works here." Was not able to point him out on the unit. 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 I will go home after I finish my medications." Patient subsequently stated she did not feel like talking and ended interview.

## 2021-01-09 RX ADMIN — Medication 0.5 MILLIGRAM(S): at 08:17

## 2021-01-09 RX ADMIN — PALIPERIDONE 3 MILLIGRAM(S): 1.5 TABLET, EXTENDED RELEASE ORAL at 20:30

## 2021-01-09 RX ADMIN — DIVALPROEX SODIUM 1000 MILLIGRAM(S): 500 TABLET, DELAYED RELEASE ORAL at 20:31

## 2021-01-09 RX ADMIN — Medication 0.5 MILLIGRAM(S): at 20:30

## 2021-01-09 RX ADMIN — OLANZAPINE 20 MILLIGRAM(S): 15 TABLET, FILM COATED ORAL at 20:30

## 2021-01-09 RX ADMIN — Medication 1 MILLIGRAM(S): at 20:31

## 2021-01-10 RX ADMIN — Medication 0.5 MILLIGRAM(S): at 08:25

## 2021-01-10 RX ADMIN — DIVALPROEX SODIUM 1000 MILLIGRAM(S): 500 TABLET, DELAYED RELEASE ORAL at 20:32

## 2021-01-10 RX ADMIN — PALIPERIDONE 3 MILLIGRAM(S): 1.5 TABLET, EXTENDED RELEASE ORAL at 20:33

## 2021-01-10 RX ADMIN — OLANZAPINE 20 MILLIGRAM(S): 15 TABLET, FILM COATED ORAL at 20:32

## 2021-01-10 RX ADMIN — Medication 1 MILLIGRAM(S): at 20:32

## 2021-01-10 RX ADMIN — Medication 0.5 MILLIGRAM(S): at 20:33

## 2021-01-11 PROCEDURE — 99231 SBSQ HOSP IP/OBS SF/LOW 25: CPT

## 2021-01-11 RX ADMIN — OLANZAPINE 20 MILLIGRAM(S): 15 TABLET, FILM COATED ORAL at 20:20

## 2021-01-11 RX ADMIN — Medication 0.5 MILLIGRAM(S): at 20:20

## 2021-01-11 RX ADMIN — Medication 1 MILLIGRAM(S): at 20:19

## 2021-01-11 RX ADMIN — DIVALPROEX SODIUM 1000 MILLIGRAM(S): 500 TABLET, DELAYED RELEASE ORAL at 20:19

## 2021-01-11 RX ADMIN — PALIPERIDONE 3 MILLIGRAM(S): 1.5 TABLET, EXTENDED RELEASE ORAL at 20:20

## 2021-01-11 RX ADMIN — Medication 0.5 MILLIGRAM(S): at 09:16

## 2021-01-11 NOTE — BH INPATIENT PSYCHIATRY PROGRESS NOTE - MSE UNSTRUCTURED FT
Pt found pacing hallways.  Limited cooperation.  Speech laconic.  Mood is "very good," affect neutral.  [place] TP.  Fair associations.  TC: Denies SIIP or HIIP.  Insight limited, judgment fair, attention fair, language fluent, gait intact.

## 2021-01-11 NOTE — BH INPATIENT PSYCHIATRY PROGRESS NOTE - NSBHFUPINTERVALHXFT_PSY_A_CORE
Mandarin  [id number].  Pt on interview states she is feeling good.  Clarifies that she did not actually see her father on the unit, but spoke to him on phone last week.  States she may feel ready to go home this week.

## 2021-01-12 PROCEDURE — 99231 SBSQ HOSP IP/OBS SF/LOW 25: CPT

## 2021-01-12 RX ADMIN — PALIPERIDONE 3 MILLIGRAM(S): 1.5 TABLET, EXTENDED RELEASE ORAL at 20:18

## 2021-01-12 RX ADMIN — Medication 0.5 MILLIGRAM(S): at 08:01

## 2021-01-12 RX ADMIN — OLANZAPINE 20 MILLIGRAM(S): 15 TABLET, FILM COATED ORAL at 20:18

## 2021-01-12 RX ADMIN — Medication 0.5 MILLIGRAM(S): at 20:18

## 2021-01-12 RX ADMIN — DIVALPROEX SODIUM 1000 MILLIGRAM(S): 500 TABLET, DELAYED RELEASE ORAL at 20:18

## 2021-01-12 RX ADMIN — Medication 1 MILLIGRAM(S): at 20:18

## 2021-01-12 NOTE — BH INPATIENT PSYCHIATRY PROGRESS NOTE - MSE UNSTRUCTURED FT
Dressed in gowns, found pacing hallway.  Speech laconic.  Mood is "good," affect is neutral and constricted.  [place] TP.  Limited associations.  TC: Poverty of TC.  Insight limited, judgment limited, attention fair, language fluent, gait intact.

## 2021-01-12 NOTE — BH INPATIENT PSYCHIATRY PROGRESS NOTE - NSBHFUPINTERVALHXFT_PSY_A_CORE
Mandarin  [id number].  Pt states today she continues to feel well, eating and sleeping adequately.  No other requests or complaints today.

## 2021-01-12 NOTE — BH INPATIENT PSYCHIATRY PROGRESS NOTE - NSBHASSESSSUMMFT_PSY_ALL_CORE
Continues to be in behavioral control.  Likely mostly negative sx at this time.    Continue meds as ordered.  Dispo planning.

## 2021-01-13 PROCEDURE — 99231 SBSQ HOSP IP/OBS SF/LOW 25: CPT

## 2021-01-13 RX ORDER — PALIPERIDONE 1.5 MG/1
819 TABLET, EXTENDED RELEASE ORAL
Qty: 9 | Refills: 0
Start: 2021-01-13 | End: 2021-01-13

## 2021-01-13 RX ORDER — BENZTROPINE MESYLATE 1 MG
1 TABLET ORAL
Qty: 60 | Refills: 0
Start: 2021-01-13 | End: 2021-02-11

## 2021-01-13 RX ORDER — CLONAZEPAM 1 MG
1 TABLET ORAL
Qty: 30 | Refills: 0
Start: 2021-01-13 | End: 2021-02-11

## 2021-01-13 RX ORDER — PALIPERIDONE 1.5 MG/1
1 TABLET, EXTENDED RELEASE ORAL
Qty: 30 | Refills: 0
Start: 2021-01-13 | End: 2021-02-11

## 2021-01-13 RX ORDER — OLANZAPINE 15 MG/1
1 TABLET, FILM COATED ORAL
Qty: 30 | Refills: 0
Start: 2021-01-13 | End: 2021-02-11

## 2021-01-13 RX ORDER — CLONAZEPAM 1 MG
1 TABLET ORAL AT BEDTIME
Refills: 0 | Status: DISCONTINUED | OUTPATIENT
Start: 2021-01-13 | End: 2021-01-14

## 2021-01-13 RX ORDER — DIVALPROEX SODIUM 500 MG/1
2 TABLET, DELAYED RELEASE ORAL
Qty: 60 | Refills: 0
Start: 2021-01-13 | End: 2021-02-11

## 2021-01-13 RX ADMIN — OLANZAPINE 20 MILLIGRAM(S): 15 TABLET, FILM COATED ORAL at 20:24

## 2021-01-13 RX ADMIN — Medication 0.5 MILLIGRAM(S): at 20:24

## 2021-01-13 RX ADMIN — Medication 0.5 MILLIGRAM(S): at 08:37

## 2021-01-13 RX ADMIN — PALIPERIDONE 3 MILLIGRAM(S): 1.5 TABLET, EXTENDED RELEASE ORAL at 20:23

## 2021-01-13 RX ADMIN — Medication 1 MILLIGRAM(S): at 20:23

## 2021-01-13 RX ADMIN — DIVALPROEX SODIUM 1000 MILLIGRAM(S): 500 TABLET, DELAYED RELEASE ORAL at 20:23

## 2021-01-13 NOTE — BH INPATIENT PSYCHIATRY PROGRESS NOTE - NSBHFUPINTERVALHXFT_PSY_A_CORE
Mandarin  [phone].  Pt reports she is feeling well, has no complaints, still in agreement to transitioning out of hospital tomorrow.

## 2021-01-13 NOTE — BH INPATIENT PSYCHIATRY PROGRESS NOTE - MSE UNSTRUCTURED FT
Dressed in gowns found sitting with peers in dining area waiting for lunch.  Cooperative.  Speech laconic.  Mood is "good," affect neutral, flat.  [place] TP.  Limited associations.  TC: poverty of TC.  Insight limited, judgment fair on interview, language fluent, gait intact.

## 2021-01-13 NOTE — BH INPATIENT PSYCHIATRY PROGRESS NOTE - NSTXMEDICDATEEST_PSY_ALL_CORE
17-Dec-2020

## 2021-01-14 VITALS — TEMPERATURE: 98 F

## 2021-01-14 PROCEDURE — 99238 HOSP IP/OBS DSCHRG MGMT 30/<: CPT

## 2021-01-14 RX ADMIN — Medication 0.5 MILLIGRAM(S): at 08:16

## 2021-01-14 NOTE — BH INPATIENT PSYCHIATRY DISCHARGE NOTE - NSICDXPASTMEDICALHX_GEN_ALL_CORE_FT
PAST MEDICAL HISTORY:  Schizoaffective disorder     Schizophrenia     
PAST MEDICAL HISTORY:  Schizoaffective disorder     Schizophrenia

## 2021-01-14 NOTE — BH INPATIENT PSYCHIATRY PROGRESS NOTE - NSTXMEDICGOAL_PSY_ALL_CORE
Take all medications as prescribed
Be able to describe the benefit of medication/treatment
Be able to describe the benefit of medication/treatment
Take all medications as prescribed
Be able to describe the benefit of medication/treatment
Be able to describe the benefit of medication/treatment
Take all medications as prescribed
Take all medications as prescribed
Be able to describe the benefit of medication/treatment
Take all medications as prescribed
Be able to describe the benefit of medication/treatment
Take all medications as prescribed

## 2021-01-14 NOTE — BH INPATIENT PSYCHIATRY DISCHARGE NOTE - NSBHDCRISKMITIGATEFT_PSY_ALL_CORE
Pt has chronic risk factors of schizoaffective disorder, prior admissions, hx of agitation/aggression, hx of noncompliance, chronic medical problems, with acute risk factors of recent agitation/aggression, now treated with inpatient care.  Protective factors of stable structured housing, ACT team, no access to firearms.  Pt is high CHRONIC risk of harm, but is NO longer an acute or imminent risk of harm to self or others, can be discharged with outpatient follow up.

## 2021-01-14 NOTE — BH INPATIENT PSYCHIATRY DISCHARGE NOTE - NSDCPROCEDURES_PSY_ALL_CORE
No significant procedures or tests performed during this admission,
Significant procedures or tests performed during this admission,

## 2021-01-14 NOTE — BH INPATIENT PSYCHIATRY DISCHARGE NOTE - NSDCCPCAREPLAN_GEN_ALL_CORE_FT
PRINCIPAL DISCHARGE DIAGNOSIS  Diagnosis: Schizoaffective disorder  Assessment and Plan of Treatment:       
PRINCIPAL DISCHARGE DIAGNOSIS  Diagnosis: Schizoaffective disorder  Assessment and Plan of Treatment: med management

## 2021-01-14 NOTE — BH INPATIENT PSYCHIATRY DISCHARGE NOTE - HOSPITAL COURSE
Upon arrival to unit, pt presented as childlike, somewhat disorganized, with limited insight into her illness and admission.  Collateral from residence and ACt team indicated that pt had been more aggressive in residence with peers.  Pt's medicated baseline was described as disorganized, concrete, childlike, but without aggression.  Collateral sources were inconsistent and contradicting in reports of noncompliance and most recent outpatient medication regimen.  Pt was confirmed to have received her Invega Trinza 819 mg injection on 12/15/2020 and would not be due again until around 3/15/2021.  Pt was maintained in hospital on regimen of olanzapine 20 mg qhs, Depakote ER 1000 mg qhs, Invega 3 mg ER qhs, clonazepam 1 mg qhs, and benztropine 0.5 mg bid.  She was compliant with all medications.  Pt only exhibited very brief episode of verbal agitation once during admission on first attempt at transition out of hospital, however otherwise did not display any aggression on unit.  She was otherwise visible on unit, mostly keeping to self (likely language barrier + negative sx of psychosis), and attended to her own ADLs independently.  She also consistently denied any suicidality or homicidality.  Given that pt was no longer an acute or imminent risk of harm to self or others, she was discharged to residence with ACT follow up.     Upon arrival to unit, pt presented as childlike, somewhat disorganized, with limited insight into her illness and admission.  Collateral from residence and ACT team indicated that pt had been more aggressive in residence with peers.  Pt's medicated baseline was described as disorganized, concrete, childlike, but without aggression.  Collateral sources were inconsistent and contradicting in reports of noncompliance and most recent outpatient medication regimen.  Pt was confirmed to have received her Invega Trinza 819 mg injection on 12/15/2020 and would not be due again until around 3/15/2021.  Pt was maintained in hospital on regimen of olanzapine 20 mg qhs, Depakote ER 1000 mg qhs, Invega 3 mg ER qhs, clonazepam 1 mg qhs, and benztropine 0.5 mg bid.  She was compliant with all medications.  Pt only exhibited very brief episode of verbal agitation once during admission on first attempt at transition out of hospital, however otherwise did not display any aggression on unit.  She was otherwise visible on unit, mostly keeping to self (likely language barrier + negative sx of psychosis), and attended to her own ADLs independently.  She also consistently denied any suicidality or homicidality.  Given that pt was no longer an acute or imminent risk of harm to self or others, she was discharged to residence with ACT follow up.

## 2021-01-14 NOTE — BH INPATIENT PSYCHIATRY PROGRESS NOTE - NSTXDCOPNODATEEST_PSY_ALL_CORE
17-Dec-2020
17-Dec-2020
13-Jan-2021
17-Dec-2020

## 2021-01-14 NOTE — BH INPATIENT PSYCHIATRY PROGRESS NOTE - NSBHFUPINTERVALCCFT_PSY_A_CORE
behavioral disturbances

## 2021-01-14 NOTE — BH INPATIENT PSYCHIATRY PROGRESS NOTE - NSBHFUPINTERVALHXFT_PSY_A_CORE
Pt seen with Mandarin speaking clinical staff.  Pt reports she is feeling good, has no complaints, is in agreement with transitioning out of hospital today.

## 2021-01-14 NOTE — BH INPATIENT PSYCHIATRY PROGRESS NOTE - NSTXDCOPNOGOAL_PSY_ALL_CORE
Will agree to participate in appropriate outpatient care
Other...
Will agree to participate in appropriate outpatient care
Will agree to participate in appropriate outpatient care
Other...
Will agree to participate in appropriate outpatient care

## 2021-01-14 NOTE — BH INPATIENT PSYCHIATRY PROGRESS NOTE - MSE OPTIONS
Unstructured MSE

## 2021-01-14 NOTE — BH INPATIENT PSYCHIATRY PROGRESS NOTE - NSTXPSYCHOGOAL_PSY_ALL_CORE
Be able to utilize coping skills to ignore hallucinations so that he/she could attend and participate constructively in groups
Be able to utilize coping skills to ignore hallucinations so that he/she could attend and participate constructively in groups
Will be able to report experiencing hallucinations to staff
Be able to utilize coping skills to ignore hallucinations so that he/she could attend and participate constructively in groups
Be able to utilize coping skills to ignore hallucinations so that he/she could attend and participate constructively in groups
Will be able to report experiencing hallucinations to staff
Be able to utilize coping skills to ignore hallucinations so that he/she could attend and participate constructively in groups
Will be able to report experiencing hallucinations to staff
Be able to utilize coping skills to ignore hallucinations so that he/she could attend and participate constructively in groups
Will be able to report experiencing hallucinations to staff

## 2021-01-14 NOTE — BH INPATIENT PSYCHIATRY PROGRESS NOTE - NSBHCONSULTIPREASON_PSY_A_CORE
danger to others; mental illness expected to respond to inpatient care
danger to self; mental illness expected to respond to inpatient care
danger to others; mental illness expected to respond to inpatient care
danger to self; mental illness expected to respond to inpatient care
danger to others; mental illness expected to respond to inpatient care
danger to self; mental illness expected to respond to inpatient care
danger to self; mental illness expected to respond to inpatient care
danger to others; mental illness expected to respond to inpatient care
danger to self; mental illness expected to respond to inpatient care
other reason
other reason
danger to others; mental illness expected to respond to inpatient care
danger to self; mental illness expected to respond to inpatient care
danger to others; mental illness expected to respond to inpatient care
danger to self; mental illness expected to respond to inpatient care
other reason

## 2021-01-14 NOTE — BH INPATIENT PSYCHIATRY PROGRESS NOTE - NSTXPROBPSYCHO_PSY_ALL_CORE
PSYCHOTIC SYMPTOMS

## 2021-01-14 NOTE — BH INPATIENT PSYCHIATRY PROGRESS NOTE - NSBHMETABOLIC_PSY_ALL_CORE_FT
BMI: BMI (kg/m2): 35.4 (12-17-20 @ 10:37)  HbA1c: Hemoglobin A1C, Whole Blood: 5.2 % (01-29-20 @ 08:52)    Glucose:   BP: --  Lipid Panel: Date/Time: 01-29-20 @ 08:52  Cholesterol, Serum: 151  Direct LDL: 87  HDL Cholesterol, Serum: 46  Total Cholesterol/HDL Ration Measurement: --  Triglycerides, Serum: 87  
BMI: BMI (kg/m2): 35.4 (12-17-20 @ 10:37)  HbA1c: Hemoglobin A1C, Whole Blood: 5.2 % (01-29-20 @ 08:52)    Glucose:   BP: 124/72 (01-03-21 @ 06:36) (124/72 - 137/81)  Lipid Panel: Date/Time: 01-29-20 @ 08:52  Cholesterol, Serum: 151  Direct LDL: 87  HDL Cholesterol, Serum: 46  Total Cholesterol/HDL Ration Measurement: --  Triglycerides, Serum: 87  
BMI: BMI (kg/m2): 35.4 (12-17-20 @ 10:37)  HbA1c: Hemoglobin A1C, Whole Blood: 5.2 % (01-29-20 @ 08:52)    Glucose:   BP: --  Lipid Panel: Date/Time: 01-29-20 @ 08:52  Cholesterol, Serum: 151  Direct LDL: 87  HDL Cholesterol, Serum: 46  Total Cholesterol/HDL Ration Measurement: --  Triglycerides, Serum: 87  
BMI: BMI (kg/m2): 35.4 (12-17-20 @ 10:37)  HbA1c: Hemoglobin A1C, Whole Blood: 5.2 % (01-29-20 @ 08:52)    Glucose:   BP: 132/74 (12-22-20 @ 07:45) (132/74 - 132/74)  Lipid Panel: Date/Time: 01-29-20 @ 08:52  Cholesterol, Serum: 151  Direct LDL: 87  HDL Cholesterol, Serum: 46  Total Cholesterol/HDL Ration Measurement: --  Triglycerides, Serum: 87  
BMI: BMI (kg/m2): 35.4 (12-17-20 @ 10:37)  HbA1c: Hemoglobin A1C, Whole Blood: 5.2 % (01-29-20 @ 08:52)    Glucose:   BP: --  Lipid Panel: Date/Time: 01-29-20 @ 08:52  Cholesterol, Serum: 151  Direct LDL: 87  HDL Cholesterol, Serum: 46  Total Cholesterol/HDL Ration Measurement: --  Triglycerides, Serum: 87  
BMI: BMI (kg/m2): 35.4 (12-17-20 @ 10:37)  HbA1c: Hemoglobin A1C, Whole Blood: 5.2 % (01-29-20 @ 08:52)    Glucose:   BP: --  Lipid Panel: Date/Time: 01-29-20 @ 08:52  Cholesterol, Serum: 151  Direct LDL: 87  HDL Cholesterol, Serum: 46  Total Cholesterol/HDL Ration Measurement: --  Triglycerides, Serum: 87  
BMI: BMI (kg/m2): 35.4 (12-17-20 @ 10:37)  HbA1c: Hemoglobin A1C, Whole Blood: 5.2 % (01-29-20 @ 08:52)    Glucose:   BP: 132/74 (12-22-20 @ 07:45) (132/74 - 132/74)  Lipid Panel: Date/Time: 01-29-20 @ 08:52  Cholesterol, Serum: 151  Direct LDL: 87  HDL Cholesterol, Serum: 46  Total Cholesterol/HDL Ration Measurement: --  Triglycerides, Serum: 87  
BMI: BMI (kg/m2): 35.4 (12-17-20 @ 10:37)  HbA1c: Hemoglobin A1C, Whole Blood: 5.2 % (01-29-20 @ 08:52)    Glucose:   BP: 98/70 (12-30-20 @ 06:52) (98/70 - 98/70)  Lipid Panel: Date/Time: 01-29-20 @ 08:52  Cholesterol, Serum: 151  Direct LDL: 87  HDL Cholesterol, Serum: 46  Total Cholesterol/HDL Ration Measurement: --  Triglycerides, Serum: 87  
BMI: BMI (kg/m2): 35.4 (12-17-20 @ 10:37)  HbA1c: Hemoglobin A1C, Whole Blood: 5.2 % (01-29-20 @ 08:52)    Glucose:   BP: 121/66 (12-18-20 @ 07:48) (121/66 - 121/66)  Lipid Panel: Date/Time: 01-29-20 @ 08:52  Cholesterol, Serum: 151  Direct LDL: 87  HDL Cholesterol, Serum: 46  Total Cholesterol/HDL Ration Measurement: --  Triglycerides, Serum: 87  
BMI: BMI (kg/m2): 35.4 (12-17-20 @ 10:37)  HbA1c: Hemoglobin A1C, Whole Blood: 5.2 % (01-29-20 @ 08:52)    Glucose:   BP: 132/74 (12-22-20 @ 07:45) (132/74 - 132/74)  Lipid Panel: Date/Time: 01-29-20 @ 08:52  Cholesterol, Serum: 151  Direct LDL: 87  HDL Cholesterol, Serum: 46  Total Cholesterol/HDL Ration Measurement: --  Triglycerides, Serum: 87  
BMI: BMI (kg/m2): 35.4 (12-17-20 @ 10:37)  HbA1c: Hemoglobin A1C, Whole Blood: 5.2 % (01-29-20 @ 08:52)    Glucose:   BP: 124/72 (01-03-21 @ 06:36) (124/72 - 124/72)  Lipid Panel: Date/Time: 01-29-20 @ 08:52  Cholesterol, Serum: 151  Direct LDL: 87  HDL Cholesterol, Serum: 46  Total Cholesterol/HDL Ration Measurement: --  Triglycerides, Serum: 87  
BMI: BMI (kg/m2): 35.4 (12-17-20 @ 10:37)  HbA1c: Hemoglobin A1C, Whole Blood: 5.2 % (01-29-20 @ 08:52)    Glucose:   BP: --  Lipid Panel: Date/Time: 01-29-20 @ 08:52  Cholesterol, Serum: 151  Direct LDL: 87  HDL Cholesterol, Serum: 46  Total Cholesterol/HDL Ration Measurement: --  Triglycerides, Serum: 87  
BMI: BMI (kg/m2): 35.4 (12-17-20 @ 10:37)  HbA1c: Hemoglobin A1C, Whole Blood: 5.2 % (01-29-20 @ 08:52)    Glucose:   BP: 121/66 (12-18-20 @ 07:48) (121/66 - 121/66)  Lipid Panel: Date/Time: 01-29-20 @ 08:52  Cholesterol, Serum: 151  Direct LDL: 87  HDL Cholesterol, Serum: 46  Total Cholesterol/HDL Ration Measurement: --  Triglycerides, Serum: 87  
BMI: BMI (kg/m2): 35.4 (12-17-20 @ 10:37)  HbA1c: Hemoglobin A1C, Whole Blood: 5.2 % (01-29-20 @ 08:52)    Glucose:   BP: 128/86 (12-28-20 @ 06:40) (128/86 - 131/72)  Lipid Panel: Date/Time: 01-29-20 @ 08:52  Cholesterol, Serum: 151  Direct LDL: 87  HDL Cholesterol, Serum: 46  Total Cholesterol/HDL Ration Measurement: --  Triglycerides, Serum: 87  
BMI: BMI (kg/m2): 35.4 (12-17-20 @ 10:37)  HbA1c: Hemoglobin A1C, Whole Blood: 5.2 % (01-29-20 @ 08:52)    Glucose:   BP: --  Lipid Panel: Date/Time: 01-29-20 @ 08:52  Cholesterol, Serum: 151  Direct LDL: 87  HDL Cholesterol, Serum: 46  Total Cholesterol/HDL Ration Measurement: --  Triglycerides, Serum: 87  
BMI: BMI (kg/m2): 35.4 (12-17-20 @ 10:37)  HbA1c: Hemoglobin A1C, Whole Blood: 5.2 % (01-29-20 @ 08:52)    Glucose:   BP: --  Lipid Panel: Date/Time: 01-29-20 @ 08:52  Cholesterol, Serum: 151  Direct LDL: 87  HDL Cholesterol, Serum: 46  Total Cholesterol/HDL Ration Measurement: --  Triglycerides, Serum: 87  
BMI: BMI (kg/m2): 35.4 (12-17-20 @ 10:37)  HbA1c: Hemoglobin A1C, Whole Blood: 5.2 % (01-29-20 @ 08:52)    Glucose:   BP: 121/66 (12-18-20 @ 07:48) (121/66 - 121/66)  Lipid Panel: Date/Time: 01-29-20 @ 08:52  Cholesterol, Serum: 151  Direct LDL: 87  HDL Cholesterol, Serum: 46  Total Cholesterol/HDL Ration Measurement: --  Triglycerides, Serum: 87  
BMI: BMI (kg/m2): 35.4 (12-17-20 @ 10:37)  HbA1c: Hemoglobin A1C, Whole Blood: 5.2 % (01-29-20 @ 08:52)    Glucose:   BP: 98/70 (12-30-20 @ 06:52) (98/70 - 128/86)  Lipid Panel: Date/Time: 01-29-20 @ 08:52  Cholesterol, Serum: 151  Direct LDL: 87  HDL Cholesterol, Serum: 46  Total Cholesterol/HDL Ration Measurement: --  Triglycerides, Serum: 87  
BMI: BMI (kg/m2): 35.4 (12-17-20 @ 10:37)  HbA1c: Hemoglobin A1C, Whole Blood: 5.2 % (01-29-20 @ 08:52)    Glucose:   BP: --  Lipid Panel: Date/Time: 01-29-20 @ 08:52  Cholesterol, Serum: 151  Direct LDL: 87  HDL Cholesterol, Serum: 46  Total Cholesterol/HDL Ration Measurement: --  Triglycerides, Serum: 87  
BMI: BMI (kg/m2): 35.4 (12-17-20 @ 10:37)  HbA1c: Hemoglobin A1C, Whole Blood: 5.2 % (01-29-20 @ 08:52)    Glucose:   BP: 128/86 (12-28-20 @ 06:40) (128/86 - 131/72)  Lipid Panel: Date/Time: 01-29-20 @ 08:52  Cholesterol, Serum: 151  Direct LDL: 87  HDL Cholesterol, Serum: 46  Total Cholesterol/HDL Ration Measurement: --  Triglycerides, Serum: 87

## 2021-01-14 NOTE — BH INPATIENT PSYCHIATRY DISCHARGE NOTE - NSBHMETABOLIC_PSY_ALL_CORE_FT
BMI: BMI (kg/m2): 35.4 (12-17-20 @ 10:37)  HbA1c: Hemoglobin A1C, Whole Blood: 5.2 % (01-29-20 @ 08:52)    Glucose:   BP: 124/72 (01-03-21 @ 06:36) (124/72 - 137/81)  Lipid Panel: Date/Time: 01-29-20 @ 08:52  Cholesterol, Serum: 151  Direct LDL: 87  HDL Cholesterol, Serum: 46  Total Cholesterol/HDL Ration Measurement: --  Triglycerides, Serum: 87  
BMI: BMI (kg/m2): 35.4 (12-17-20 @ 10:37)  HbA1c: Hemoglobin A1C, Whole Blood: 5.2 % (01-29-20 @ 08:52)    Glucose:   BP: --  Lipid Panel: Date/Time: 01-29-20 @ 08:52  Cholesterol, Serum: 151  Direct LDL: 87  HDL Cholesterol, Serum: 46  Total Cholesterol/HDL Ration Measurement: --  Triglycerides, Serum: 87

## 2021-01-14 NOTE — BH INPATIENT PSYCHIATRY PROGRESS NOTE - NSTXMEDICDATETRGT_PSY_ALL_CORE
14-Jan-2021
24-Dec-2020
31-Dec-2020
14-Jan-2021
07-Jan-2021
31-Dec-2020
14-Jan-2021
24-Dec-2020
24-Dec-2020
07-Jan-2021
14-Jan-2021
24-Dec-2020
07-Jan-2021
14-Jan-2021
04-Jan-2021
31-Dec-2020
07-Jan-2021
24-Dec-2020
31-Dec-2020
24-Dec-2020

## 2021-01-14 NOTE — BH INPATIENT PSYCHIATRY PROGRESS NOTE - NSTXPROBDCOPNO_PSY_ALL_CORE
DISCHARGE ISSUE - NON-ADHERENT WITH OUTPATIENT SERVICES

## 2021-01-14 NOTE — BH INPATIENT PSYCHIATRY PROGRESS NOTE - NSTXPSYCHOPROGRES_PSY_ALL_CORE
Improving

## 2021-01-14 NOTE — BH INPATIENT PSYCHIATRY PROGRESS NOTE - NSTXDCOPNODATETRGT_PSY_ALL_CORE
30-Dec-2020
13-Jan-2021
06-Jan-2021
24-Dec-2020
24-Dec-2020
14-Jan-2021
06-Jan-2021
30-Dec-2020
13-Jan-2021
24-Dec-2020
30-Dec-2020
06-Jan-2021
13-Jan-2021
24-Dec-2020
24-Dec-2020
06-Jan-2021
24-Dec-2020
06-Jan-2021

## 2021-01-14 NOTE — BH INPATIENT PSYCHIATRY PROGRESS NOTE - NSTXPSYCHOINTERMD_PSY_ALL_CORE
Medication management
Assessment: 46yo Chinese woman with PPHx of schizoaffective disorder admitted for agitation, aggression and medication non-adherence, presentation on admission c/w psychosis. Patient is likely decompensation in setting of medication non-adherence, as per collateral patient may not have been taking medication as prescribed.      Plan:  - Admit to 2N  - Patient requires inpatient admission for safety and stabilization.  - Continue home meds. Encourage medication compliance.   - Psych, standing: Depakote 500mg twice daily, klonopin 1mg once daily, olanzapine 15mg once daily, and invega sustenna 234mg RAMOS monthly  - Psych, PRNs: Haldol 7.5 mg PO/IM, Ativan 3 mg PO/IM, Benadryl 50 PO/IM PRN aggression/agitation.  - Medical: NTD  - Substance: NTD  - Legal: 2PC  - Safety: Patient is risk for aggression. At this time does not merit CO. Will continue to monitor.  - Dispo: pending sxs improvement.   - Collateral. 
Medication management

## 2021-01-14 NOTE — BH INPATIENT PSYCHIATRY PROGRESS NOTE - MSE UNSTRUCTURED FT
Dressed in gowns, found watching television.  Calm.  Cooperative.  Speech laconic.  Mood is "good," affect neutral and flat.  [place] TP.  Limited associations.  TC: Denies SIIP or HIIp.  Insight limited, judgment fair on interview, language fluent, gait intact.

## 2021-01-14 NOTE — BH INPATIENT PSYCHIATRY PROGRESS NOTE - NSTXDCOPNOPROGRES_PSY_ALL_CORE
No Change
Improving
No Change
No Change
Improving
No Change
Improving
Improving
No Change
Improving
Improving
No Change
No Change

## 2021-01-14 NOTE — BH INPATIENT PSYCHIATRY PROGRESS NOTE - NSTXPSYCHODATETRGT_PSY_ALL_CORE
04-Jan-2021

## 2021-01-14 NOTE — BH INPATIENT PSYCHIATRY PROGRESS NOTE - NSCGIIMPROVESX_PSY_ALL_CORE
2 = Much improved - notably better with signficant reduction of symptoms; increase in the level of functioning but some symptoms remain
3 = Minimally improved - slightly better with little or no clinically meaningful reduction of symptoms.  Represents very little change in basic clinical status, level of care, or functional capacity.

## 2021-01-14 NOTE — BH INPATIENT PSYCHIATRY PROGRESS NOTE - PRN MEDS
MEDICATIONS  (PRN):  diphenhydrAMINE 50 milliGRAM(s) Oral every 4 hours PRN Rash and/or Itching  diphenhydrAMINE   Injectable 50 milliGRAM(s) IntraMuscular once PRN agitation  haloperidol     Tablet 7.5 milliGRAM(s) Oral every 4 hours PRN agitation  haloperidol    Injectable 7.5 milliGRAM(s) IntraMuscular once PRN agitation  
MEDICATIONS  (PRN):  diphenhydrAMINE 50 milliGRAM(s) Oral every 4 hours PRN Rash and/or Itching  diphenhydrAMINE   Injectable 50 milliGRAM(s) IntraMuscular once PRN agitation  haloperidol     Tablet 7.5 milliGRAM(s) Oral every 4 hours PRN agitation  haloperidol    Injectable 7.5 milliGRAM(s) IntraMuscular once PRN agitation  LORazepam     Tablet 3 milliGRAM(s) Oral every 4 hours PRN agitation  LORazepam   Injectable 3 milliGRAM(s) IntraMuscular once PRN Agitation  
MEDICATIONS  (PRN):  diphenhydrAMINE 50 milliGRAM(s) Oral every 4 hours PRN Rash and/or Itching  diphenhydrAMINE   Injectable 50 milliGRAM(s) IntraMuscular once PRN agitation  haloperidol     Tablet 7.5 milliGRAM(s) Oral every 4 hours PRN agitation  haloperidol    Injectable 7.5 milliGRAM(s) IntraMuscular once PRN agitation  
MEDICATIONS  (PRN):  diphenhydrAMINE 50 milliGRAM(s) Oral every 4 hours PRN Rash and/or Itching  diphenhydrAMINE   Injectable 50 milliGRAM(s) IntraMuscular once PRN agitation  haloperidol     Tablet 7.5 milliGRAM(s) Oral every 4 hours PRN agitation  haloperidol    Injectable 7.5 milliGRAM(s) IntraMuscular once PRN agitation  LORazepam     Tablet 3 milliGRAM(s) Oral every 4 hours PRN agitation  LORazepam   Injectable 3 milliGRAM(s) IntraMuscular once PRN Agitation  
MEDICATIONS  (PRN):  diphenhydrAMINE 50 milliGRAM(s) Oral every 4 hours PRN Rash and/or Itching  diphenhydrAMINE   Injectable 50 milliGRAM(s) IntraMuscular once PRN agitation  haloperidol     Tablet 7.5 milliGRAM(s) Oral every 4 hours PRN agitation  haloperidol    Injectable 7.5 milliGRAM(s) IntraMuscular once PRN agitation  LORazepam     Tablet 3 milliGRAM(s) Oral every 4 hours PRN agitation  LORazepam   Injectable 3 milliGRAM(s) IntraMuscular once PRN Agitation  
MEDICATIONS  (PRN):  diphenhydrAMINE 50 milliGRAM(s) Oral every 4 hours PRN Rash and/or Itching  diphenhydrAMINE   Injectable 50 milliGRAM(s) IntraMuscular once PRN agitation  haloperidol     Tablet 7.5 milliGRAM(s) Oral every 4 hours PRN agitation  haloperidol    Injectable 7.5 milliGRAM(s) IntraMuscular once PRN agitation  
MEDICATIONS  (PRN):  diphenhydrAMINE 50 milliGRAM(s) Oral every 4 hours PRN Rash and/or Itching  diphenhydrAMINE   Injectable 50 milliGRAM(s) IntraMuscular once PRN agitation  haloperidol     Tablet 7.5 milliGRAM(s) Oral every 4 hours PRN agitation  haloperidol    Injectable 7.5 milliGRAM(s) IntraMuscular once PRN agitation  LORazepam     Tablet 3 milliGRAM(s) Oral every 4 hours PRN agitation  LORazepam   Injectable 3 milliGRAM(s) IntraMuscular once PRN Agitation  
MEDICATIONS  (PRN):  diphenhydrAMINE 50 milliGRAM(s) Oral every 4 hours PRN Rash and/or Itching  diphenhydrAMINE   Injectable 50 milliGRAM(s) IntraMuscular once PRN agitation  haloperidol     Tablet 7.5 milliGRAM(s) Oral every 4 hours PRN agitation  haloperidol    Injectable 7.5 milliGRAM(s) IntraMuscular once PRN agitation  
MEDICATIONS  (PRN):  diphenhydrAMINE 50 milliGRAM(s) Oral every 4 hours PRN Rash and/or Itching  diphenhydrAMINE   Injectable 50 milliGRAM(s) IntraMuscular once PRN agitation  haloperidol     Tablet 7.5 milliGRAM(s) Oral every 4 hours PRN agitation  haloperidol    Injectable 7.5 milliGRAM(s) IntraMuscular once PRN agitation  
MEDICATIONS  (PRN):  diphenhydrAMINE 50 milliGRAM(s) Oral every 4 hours PRN Rash and/or Itching  diphenhydrAMINE   Injectable 50 milliGRAM(s) IntraMuscular once PRN agitation  haloperidol     Tablet 7.5 milliGRAM(s) Oral every 4 hours PRN agitation  haloperidol    Injectable 7.5 milliGRAM(s) IntraMuscular once PRN agitation  LORazepam     Tablet 3 milliGRAM(s) Oral every 4 hours PRN agitation  LORazepam   Injectable 3 milliGRAM(s) IntraMuscular once PRN Agitation  
MEDICATIONS  (PRN):  diphenhydrAMINE 50 milliGRAM(s) Oral every 4 hours PRN Rash and/or Itching  diphenhydrAMINE   Injectable 50 milliGRAM(s) IntraMuscular once PRN agitation  haloperidol     Tablet 7.5 milliGRAM(s) Oral every 4 hours PRN agitation  haloperidol    Injectable 7.5 milliGRAM(s) IntraMuscular once PRN agitation  LORazepam     Tablet 2 milliGRAM(s) Oral every 4 hours PRN agitation  LORazepam   Injectable 2 milliGRAM(s) IntraMuscular once PRN Agitation

## 2021-01-14 NOTE — BH INPATIENT PSYCHIATRY PROGRESS NOTE - NSTXPSYCHODATEEST_PSY_ALL_CORE
28-Dec-2020

## 2021-01-14 NOTE — BH INPATIENT PSYCHIATRY DISCHARGE NOTE - OTHER PAST PSYCHIATRIC HISTORY (INCLUDE DETAILS REGARDING ONSET, COURSE OF ILLNESS, INPATIENT/OUTPATIENT TREATMENT)
Per Chart Review:  - PPH Dx of schizoaffective disorder  - Inpatient Hosp: [facility] 1/2016, [place] 2/2016 (66 days), [facility] 4/2016 (433 days), [facility] 1/2020 (27 days).  - AOT order from 6/28/2019 to 6/28/2020  - [facility] (Anna Coleman #[phone])  
Per Chart Review:  - PPH Dx of schizoaffective disorder  - Inpatient Hosp: [facility] 1/2016, [place] 2/2016 (66 days), [facility] 4/2016 (433 days), [facility] 1/2020 (27 days).  - AOT order from 6/28/2019 to 6/28/2020  - [facility]Anna Coleman #[phone])

## 2021-01-14 NOTE — BH INPATIENT PSYCHIATRY PROGRESS NOTE - NSBHASSESSSUMMFT_PSY_ALL_CORE
Pt has not exhibited any agitation or aggression on unit.  She has been calm and compliant with meds.  No suicidality or homicidality.  In agreement with transitioning back to CR today.    Pt has chronic risk factors of schizoaffective disorder, prior admissions, hx of agitation/aggression, hx of noncompliance, chronic medical problems, with acute risk factors of recent agitation/aggression, now treated with inpatient care.  Protective factors of stable structured housing, ACT team, no access to firearms.  Pt is high CHRONIC risk of harm, but is NO longer an acute or imminent risk of harm to self or others, can be discharged with outpatient follow up.    1. Will d/c home on current regimen, prescriptions sent to outside pharmacy.  2. Psychoeducation provided regarding importance of compliance with outpatient appointments and medications.  3. Pt was advised to remain abstinent with substances.  4. Pt was advised to dial 911 or return to ER if they become danger to self or others.

## 2021-01-14 NOTE — BH INPATIENT PSYCHIATRY DISCHARGE NOTE - NSDCRECOMMEND_PSY_ALL_CORE
Unrestricted diet/activity/Recommended laboratory tests/other investigations following discharge...
Unrestricted diet/activity

## 2021-01-14 NOTE — BH INPATIENT PSYCHIATRY DISCHARGE NOTE - NSTOBACCOUSAGEY/N_GEN_A_CS
[Dear  ___] : Dear ~BRICE, [Consult Letter:] : I had the pleasure of evaluating your patient, [unfilled]. [Please see my note below.] : Please see my note below. [Sincerely,] : Sincerely, [Consult Closing:] : Thank you very much for allowing me to participate in the care of this patient.  If you have any questions, please do not hesitate to contact me. [FreeTextEntry2] : Sarabjit Bedoya MD\par [address]\[place] [address] [FreeTextEntry3] : Hanny No

## 2021-01-14 NOTE — BH INPATIENT PSYCHIATRY DISCHARGE NOTE - NSDCMRMEDTOKEN_GEN_ALL_CORE_FT
benztropine 0.5 mg oral tablet: 1 tab(s) orally 2 times a day  clonazePAM 1 mg oral tablet: 1 tab(s) orally once a day (at bedtime) MDD:1mg  divalproex sodium 500 mg oral tablet, extended release: 2 tab(s) orally once a day (at bedtime)  OLANZapine 20 mg oral tablet: 1 tab(s) orally once a day (at bedtime)  paliperidone 3 mg oral tablet, extended release: 1 tab(s) orally once a day (at bedtime)

## 2021-01-14 NOTE — BH INPATIENT PSYCHIATRY DISCHARGE NOTE - DESCRIPTION
- Domiciled at [facility] (now called [facility])  - Per chart, was abandoned by family, pt has her own power of  (as of 1/2020)
- Domiciled at [facility] (now called [facility])  - Per chart, was abandoned by family, pt has her own power of  (as of 1/2020)

## 2021-01-14 NOTE — BH INPATIENT PSYCHIATRY PROGRESS NOTE - NSTREATMENTCERTY_PSY_ALL_CORE

## 2021-01-14 NOTE — BH INPATIENT PSYCHIATRY PROGRESS NOTE - NSBHCONTPROVIDER_PSY_ALL_CORE
Yes...

## 2021-01-14 NOTE — BH INPATIENT PSYCHIATRY PROGRESS NOTE - NSBHCHARTREVIEWVS_PSY_A_CORE FT
Vital Signs Last 24 Hrs  T(C): 36.3 (29 Dec 2020 06:53), Max: 36.7 (28 Dec 2020 20:49)  T(F): 97.4 (29 Dec 2020 06:53), Max: 98 (28 Dec 2020 20:49)  HR: --  BP: --  BP(mean): --  RR: --  SpO2: --
Vital Signs Last 24 Hrs  T(C): 36.2 (18 Dec 2020 07:48), Max: 36.3 (17 Dec 2020 18:28)  T(F): 97.1 (18 Dec 2020 07:48), Max: 97.4 (17 Dec 2020 18:28)  HR: 79 (18 Dec 2020 07:48) (79 - 79)  BP: 121/66 (18 Dec 2020 07:48) (121/66 - 121/66)  BP(mean): --  RR: --  SpO2: --
Vital Signs Last 24 Hrs  T(C): 36.3 (07 Jan 2021 06:24), Max: 36.6 (06 Jan 2021 19:34)  T(F): 97.4 (07 Jan 2021 06:24), Max: 97.8 (06 Jan 2021 19:34)  HR: --  BP: --  BP(mean): --  RR: --  SpO2: --
Vital Signs Last 24 Hrs  T(C): 36.1 (28 Dec 2020 06:40), Max: 36.7 (27 Dec 2020 19:28)  T(F): 97 (28 Dec 2020 06:40), Max: 98 (27 Dec 2020 19:28)  HR: --  BP: 128/86 (28 Dec 2020 06:40) (128/86 - 128/86)  BP(mean): 93 (28 Dec 2020 06:40) (93 - 93)  RR: --  SpO2: --
Vital Signs Last 24 Hrs  T(C): 36.5 (23 Dec 2020 07:01), Max: 36.5 (23 Dec 2020 07:01)  T(F): 97.7 (23 Dec 2020 07:01), Max: 97.7 (23 Dec 2020 07:01)  HR: --  BP: --  BP(mean): --  RR: 16 (22 Dec 2020 20:22) (16 - 16)  SpO2: --
Vital Signs Last 24 Hrs  T(C): 36.3 (19 Dec 2020 06:34), Max: 36.5 (18 Dec 2020 18:44)  T(F): 97.4 (19 Dec 2020 06:34), Max: 97.7 (18 Dec 2020 18:44)  HR: --  BP: --  BP(mean): --  RR: --  SpO2: --
Vital Signs Last 24 Hrs  T(C): 36.3 (20 Dec 2020 06:54), Max: 36.7 (19 Dec 2020 19:10)  T(F): 97.3 (20 Dec 2020 06:54), Max: 98.1 (19 Dec 2020 19:10)  HR: --  BP: --  BP(mean): --  RR: --  SpO2: --
Vital Signs Last 24 Hrs  T(C): 36.4 (21 Dec 2020 06:59), Max: 36.4 (20 Dec 2020 19:13)  T(F): 97.6 (21 Dec 2020 06:59), Max: 97.6 (20 Dec 2020 19:13)  HR: --  BP: --  BP(mean): --  RR: 16 (20 Dec 2020 20:03) (16 - 16)  SpO2: --
Vital Signs Last 24 Hrs  T(C): 36.4 (31 Dec 2020 05:40), Max: 36.4 (31 Dec 2020 05:40)  T(F): 97.5 (31 Dec 2020 05:40), Max: 97.5 (31 Dec 2020 05:40)  HR: --  BP: --  BP(mean): --  RR: --  SpO2: --
Vital Signs Last 24 Hrs  T(C): 36.6 (08 Jan 2021 08:01), Max: 36.7 (07 Jan 2021 20:42)  T(F): 97.8 (08 Jan 2021 08:01), Max: 98 (07 Jan 2021 20:42)  HR: --  BP: --  BP(mean): --  RR: --  SpO2: --
Vital Signs Last 24 Hrs  T(C): 36.3 (04 Jan 2021 06:16), Max: 36.4 (03 Jan 2021 19:23)  T(F): 97.4 (04 Jan 2021 06:16), Max: 97.6 (03 Jan 2021 19:23)  HR: --  BP: --  BP(mean): --  RR: --  SpO2: --
Vital Signs Last 24 Hrs  T(C): 36.3 (22 Dec 2020 07:45), Max: 36.9 (21 Dec 2020 19:19)  T(F): 97.4 (22 Dec 2020 07:45), Max: 98.4 (21 Dec 2020 19:19)  HR: 94 (22 Dec 2020 07:45) (94 - 94)  BP: 132/74 (22 Dec 2020 07:45) (132/74 - 132/74)  BP(mean): --  RR: --  SpO2: --
Vital Signs Last 24 Hrs  T(C): 36.6 (30 Dec 2020 06:52), Max: 36.6 (30 Dec 2020 06:52)  T(F): 97.8 (30 Dec 2020 06:52), Max: 97.8 (30 Dec 2020 06:52)  HR: 98 (30 Dec 2020 06:52) (98 - 98)  BP: 98/70 (30 Dec 2020 06:52) (98/70 - 98/70)  BP(mean): --  RR: --  SpO2: --
Vital Signs Last 24 Hrs  T(C): 36.4 (06 Jan 2021 06:52), Max: 37.2 (05 Jan 2021 19:38)  T(F): 97.5 (06 Jan 2021 06:52), Max: 98.9 (05 Jan 2021 19:38)  HR: --  BP: --  BP(mean): --  RR: --  SpO2: --
Vital Signs Last 24 Hrs  T(C): 36.4 (05 Jan 2021 06:33), Max: 36.7 (04 Jan 2021 19:27)  T(F): 97.5 (05 Jan 2021 06:33), Max: 98 (04 Jan 2021 19:27)  HR: --  BP: --  BP(mean): --  RR: --  SpO2: --
Vital Signs Last 24 Hrs  T(C): 36.3 (12 Jan 2021 07:53), Max: 36.3 (12 Jan 2021 07:53)  T(F): 97.4 (12 Jan 2021 07:53), Max: 97.4 (12 Jan 2021 07:53)  HR: --  BP: --  BP(mean): --  RR: 18 (11 Jan 2021 20:26) (18 - 18)  SpO2: --
Vital Signs Last 24 Hrs  T(C): 36.3 (24 Dec 2020 05:43), Max: 36.3 (23 Dec 2020 19:15)  T(F): 97.4 (24 Dec 2020 05:43), Max: 97.4 (23 Dec 2020 19:15)  HR: --  BP: --  BP(mean): --  RR: --  SpO2: --
Vital Signs Last 24 Hrs  T(C): 36.5 (11 Jan 2021 07:13), Max: 36.6 (10 Jaden 2021 19:25)  T(F): 97.7 (11 Jan 2021 07:13), Max: 97.8 (10 Jaden 2021 19:25)  HR: 79 (11 Jan 2021 07:13) (79 - 79)  BP: --  BP(mean): --  RR: --  SpO2: --
Vital Signs Last 24 Hrs  T(C): 36.4 (14 Jan 2021 08:12), Max: 36.4 (13 Jan 2021 19:14)  T(F): 97.6 (14 Jan 2021 08:12), Max: 97.6 (13 Jan 2021 19:14)  HR: --  BP: --  BP(mean): --  RR: --  SpO2: --
Vital Signs Last 24 Hrs  T(C): 36.6 (13 Jan 2021 05:34), Max: 36.6 (12 Jan 2021 17:33)  T(F): 97.8 (13 Jan 2021 05:34), Max: 97.9 (12 Jan 2021 17:33)  HR: --  BP: --  BP(mean): --  RR: 18 (13 Jan 2021 05:34) (18 - 18)  SpO2: --

## 2021-01-14 NOTE — BH INPATIENT PSYCHIATRY PROGRESS NOTE - NSCGISEVERILLNESS_PSY_ALL_CORE
5 = Markedly ill - intrusive symptoms that distinctly impair social/occupational function or cause intrusive levels of distress
4 = Moderately ill – overt symptoms causing noticeable, but modest, functional impairment or distress; symptom level may warrant medication
5 = Markedly ill - intrusive symptoms that distinctly impair social/occupational function or cause intrusive levels of distress

## 2021-01-14 NOTE — BH INPATIENT PSYCHIATRY DISCHARGE NOTE - HPI (INCLUDE ILLNESS QUALITY, SEVERITY, DURATION, TIMING, CONTEXT, MODIFYING FACTORS, ASSOCIATED SIGNS AND SYMPTOMS)
44yo Chinese woman, Mandarin-speaking, domiciled at [facility], PMHx of allergic rhinitis, GERD, and a PPHx of schizoaffective disorder, multiple hospitalizations including a 440-day hospitalization from 4/2016-6/2017, most recently hospitalized in 1/2020 for 1 month, admitted from ED to [facility] on 9.27 status for agitation, aggression and medication non-adherence. Patient seen in her room with treatment team. Of note, patient was a poor historian and history is limited. Patient states she is here for a "physical checkup of my entire body." When asked what is wrong with her body, patient states, "nothing is wrong, I don't know why I am here." When asked where she is, patient states, "a medical hospital, but I'm not sure the type." Per , patient was making statements that did not make sense, such as, "safe hospital inspection." Patient smiled and laughed unprompted during interview. When asked what was funny she said "nothing." Patient denies issues and aggression at her group home. Denies depression SI/HI. Denies acute psych ros. Patient gave permission to contact collateral. At this point, patient stated "no more questions."    See  Chart Note filed on this date for additional collateral.  44yo Chinese woman, Mandarin-speaking, domiciled at [facility], PMHx of allergic rhinitis, GERD, and a PPHx of schizoaffective disorder, multiple hospitalizations including a 440-day hospitalization from 4/2016-6/2017, most recently hospitalized in 1/2020 for 1 month, admitted from ED to [facility] on 9.27 status for agitation, aggression and medication non-adherence. Patient seen in her room with treatment team. Of note, patient was a poor historian and history is limited. Patient states she is here for a "physical checkup of my entire body." When asked what is wrong with her body, patient states, "nothing is wrong, I don't know why I am here." When asked where she is, patient states, "a medical hospital, but I'm not sure the type." Per , patient was making statements that did not make sense, such as, "safe hospital inspection." Patient smiled and laughed unprompted during interview. When asked what was funny she said "nothing." Patient denies issues and aggression at her group home. Denies depression SI/HI. Denies acute psych ros. Patient gave permission to contact collateral. At this point, patient stated "no more questions."

## 2021-01-14 NOTE — BH INPATIENT PSYCHIATRY PROGRESS NOTE - NSTXMEDICPROGRES_PSY_ALL_CORE
No Change
Improving
Met - goal discontinued
No Change
Met - goal discontinued
Met - goal discontinued
Improving
Met - goal discontinued
Met - goal discontinued
No Change
Improving
Met - goal discontinued
Improving
Met - goal discontinued
No Change
No Change
Met - goal discontinued
No Change

## 2021-01-14 NOTE — BH INPATIENT PSYCHIATRY PROGRESS NOTE - NSICDXBHPRIMARYDX_PSY_ALL_CORE
Schizoaffective disorder   F25.9  

## 2021-01-14 NOTE — BH INPATIENT PSYCHIATRY DISCHARGE NOTE - NSBHDCMDCOMP_PSY_ALL_CORE
This section is complete and the patient is ready for discharge
This section is complete and the patient is ready for discharge

## 2021-01-14 NOTE — BH INPATIENT PSYCHIATRY PROGRESS NOTE - CURRENT MEDICATION
MEDICATIONS  (STANDING):  benztropine 0.5 milliGRAM(s) Oral two times a day  clonazePAM  Tablet 1 milliGRAM(s) Oral at bedtime  diVALproex ER 1000 milliGRAM(s) Oral at bedtime  OLANZapine 20 milliGRAM(s) Oral at bedtime  paliperidone ER 3 milliGRAM(s) Oral at bedtime    MEDICATIONS  (PRN):  diphenhydrAMINE 50 milliGRAM(s) Oral every 4 hours PRN Rash and/or Itching  diphenhydrAMINE   Injectable 50 milliGRAM(s) IntraMuscular once PRN agitation  haloperidol     Tablet 7.5 milliGRAM(s) Oral every 4 hours PRN agitation  haloperidol    Injectable 7.5 milliGRAM(s) IntraMuscular once PRN agitation  LORazepam     Tablet 2 milliGRAM(s) Oral every 4 hours PRN agitation  LORazepam   Injectable 2 milliGRAM(s) IntraMuscular once PRN Agitation  
MEDICATIONS  (STANDING):  benztropine 0.5 milliGRAM(s) Oral two times a day  clonazePAM  Tablet 1 milliGRAM(s) Oral at bedtime  diVALproex ER 1000 milliGRAM(s) Oral at bedtime  OLANZapine 20 milliGRAM(s) Oral at bedtime  paliperidone ER 3 milliGRAM(s) Oral at bedtime    MEDICATIONS  (PRN):  diphenhydrAMINE 50 milliGRAM(s) Oral every 4 hours PRN Rash and/or Itching  diphenhydrAMINE   Injectable 50 milliGRAM(s) IntraMuscular once PRN agitation  haloperidol     Tablet 7.5 milliGRAM(s) Oral every 4 hours PRN agitation  haloperidol    Injectable 7.5 milliGRAM(s) IntraMuscular once PRN agitation  LORazepam     Tablet 3 milliGRAM(s) Oral every 4 hours PRN agitation  LORazepam   Injectable 3 milliGRAM(s) IntraMuscular once PRN Agitation  
MEDICATIONS  (STANDING):  clonazePAM  Tablet 1 milliGRAM(s) Oral at bedtime  diVALproex ER 1000 milliGRAM(s) Oral at bedtime  OLANZapine 15 milliGRAM(s) Oral at bedtime    MEDICATIONS  (PRN):  diphenhydrAMINE 50 milliGRAM(s) Oral every 4 hours PRN Rash and/or Itching  diphenhydrAMINE   Injectable 50 milliGRAM(s) IntraMuscular once PRN agitation  haloperidol     Tablet 7.5 milliGRAM(s) Oral every 4 hours PRN agitation  haloperidol    Injectable 7.5 milliGRAM(s) IntraMuscular once PRN agitation  
MEDICATIONS  (STANDING):  clonazePAM  Tablet 1 milliGRAM(s) Oral two times a day  diVALproex  milliGRAM(s) Oral two times a day  OLANZapine 15 milliGRAM(s) Oral at bedtime    MEDICATIONS  (PRN):  diphenhydrAMINE 50 milliGRAM(s) Oral every 4 hours PRN Rash and/or Itching  diphenhydrAMINE   Injectable 50 milliGRAM(s) IntraMuscular once PRN agitation  haloperidol     Tablet 7.5 milliGRAM(s) Oral every 4 hours PRN agitation  haloperidol    Injectable 7.5 milliGRAM(s) IntraMuscular once PRN agitation  LORazepam     Tablet 3 milliGRAM(s) Oral every 4 hours PRN agitation  LORazepam   Injectable 3 milliGRAM(s) IntraMuscular once PRN Agitation  
MEDICATIONS  (STANDING):  clonazePAM  Tablet 1 milliGRAM(s) Oral at bedtime  diVALproex ER 1000 milliGRAM(s) Oral at bedtime  OLANZapine 15 milliGRAM(s) Oral at bedtime    MEDICATIONS  (PRN):  diphenhydrAMINE 50 milliGRAM(s) Oral every 4 hours PRN Rash and/or Itching  diphenhydrAMINE   Injectable 50 milliGRAM(s) IntraMuscular once PRN agitation  haloperidol     Tablet 7.5 milliGRAM(s) Oral every 4 hours PRN agitation  haloperidol    Injectable 7.5 milliGRAM(s) IntraMuscular once PRN agitation  
MEDICATIONS  (STANDING):  clonazePAM  Tablet 1 milliGRAM(s) Oral two times a day  diVALproex  milliGRAM(s) Oral two times a day  OLANZapine 15 milliGRAM(s) Oral at bedtime    MEDICATIONS  (PRN):  diphenhydrAMINE 50 milliGRAM(s) Oral every 4 hours PRN Rash and/or Itching  diphenhydrAMINE   Injectable 50 milliGRAM(s) IntraMuscular once PRN agitation  haloperidol     Tablet 7.5 milliGRAM(s) Oral every 4 hours PRN agitation  haloperidol    Injectable 7.5 milliGRAM(s) IntraMuscular once PRN agitation  LORazepam     Tablet 3 milliGRAM(s) Oral every 4 hours PRN agitation  LORazepam   Injectable 3 milliGRAM(s) IntraMuscular once PRN Agitation  
MEDICATIONS  (STANDING):  clonazePAM  Tablet 0.5 milliGRAM(s) Oral daily  clonazePAM  Tablet 1 milliGRAM(s) Oral at bedtime  diVALproex ER 1000 milliGRAM(s) Oral at bedtime  OLANZapine 15 milliGRAM(s) Oral at bedtime    MEDICATIONS  (PRN):  diphenhydrAMINE 50 milliGRAM(s) Oral every 4 hours PRN Rash and/or Itching  diphenhydrAMINE   Injectable 50 milliGRAM(s) IntraMuscular once PRN agitation  haloperidol     Tablet 7.5 milliGRAM(s) Oral every 4 hours PRN agitation  haloperidol    Injectable 7.5 milliGRAM(s) IntraMuscular once PRN agitation  
MEDICATIONS  (STANDING):  benztropine 0.5 milliGRAM(s) Oral two times a day  clonazePAM  Tablet 1 milliGRAM(s) Oral at bedtime  diVALproex ER 1000 milliGRAM(s) Oral at bedtime  OLANZapine 15 milliGRAM(s) Oral at bedtime  paliperidone ER 3 milliGRAM(s) Oral at bedtime    MEDICATIONS  (PRN):  diphenhydrAMINE 50 milliGRAM(s) Oral every 4 hours PRN Rash and/or Itching  diphenhydrAMINE   Injectable 50 milliGRAM(s) IntraMuscular once PRN agitation  haloperidol     Tablet 7.5 milliGRAM(s) Oral every 4 hours PRN agitation  haloperidol    Injectable 7.5 milliGRAM(s) IntraMuscular once PRN agitation  
MEDICATIONS  (STANDING):  clonazePAM  Tablet 1 milliGRAM(s) Oral two times a day  diVALproex  milliGRAM(s) Oral two times a day  OLANZapine 15 milliGRAM(s) Oral at bedtime    MEDICATIONS  (PRN):  diphenhydrAMINE 50 milliGRAM(s) Oral every 4 hours PRN Rash and/or Itching  diphenhydrAMINE   Injectable 50 milliGRAM(s) IntraMuscular once PRN agitation  haloperidol     Tablet 7.5 milliGRAM(s) Oral every 4 hours PRN agitation  haloperidol    Injectable 7.5 milliGRAM(s) IntraMuscular once PRN agitation  LORazepam     Tablet 3 milliGRAM(s) Oral every 4 hours PRN agitation  LORazepam   Injectable 3 milliGRAM(s) IntraMuscular once PRN Agitation  
MEDICATIONS  (STANDING):  clonazePAM  Tablet 1 milliGRAM(s) Oral two times a day  diVALproex  milliGRAM(s) Oral two times a day  OLANZapine 15 milliGRAM(s) Oral at bedtime    MEDICATIONS  (PRN):  diphenhydrAMINE 50 milliGRAM(s) Oral every 4 hours PRN Rash and/or Itching  diphenhydrAMINE   Injectable 50 milliGRAM(s) IntraMuscular once PRN agitation  haloperidol     Tablet 7.5 milliGRAM(s) Oral every 4 hours PRN agitation  haloperidol    Injectable 7.5 milliGRAM(s) IntraMuscular once PRN agitation  LORazepam     Tablet 3 milliGRAM(s) Oral every 4 hours PRN agitation  LORazepam   Injectable 3 milliGRAM(s) IntraMuscular once PRN Agitation  
MEDICATIONS  (STANDING):  benztropine 0.5 milliGRAM(s) Oral two times a day  clonazePAM  Tablet 1 milliGRAM(s) Oral at bedtime  diVALproex ER 1000 milliGRAM(s) Oral at bedtime  OLANZapine 20 milliGRAM(s) Oral at bedtime  paliperidone ER 3 milliGRAM(s) Oral at bedtime    MEDICATIONS  (PRN):  diphenhydrAMINE 50 milliGRAM(s) Oral every 4 hours PRN Rash and/or Itching  diphenhydrAMINE   Injectable 50 milliGRAM(s) IntraMuscular once PRN agitation  haloperidol     Tablet 7.5 milliGRAM(s) Oral every 4 hours PRN agitation  haloperidol    Injectable 7.5 milliGRAM(s) IntraMuscular once PRN agitation  LORazepam     Tablet 3 milliGRAM(s) Oral every 4 hours PRN agitation  LORazepam   Injectable 3 milliGRAM(s) IntraMuscular once PRN Agitation  
MEDICATIONS  (STANDING):  benztropine 0.5 milliGRAM(s) Oral two times a day  clonazePAM  Tablet 1 milliGRAM(s) Oral at bedtime  diVALproex ER 1000 milliGRAM(s) Oral at bedtime  OLANZapine 20 milliGRAM(s) Oral at bedtime  paliperidone ER 3 milliGRAM(s) Oral at bedtime    MEDICATIONS  (PRN):  diphenhydrAMINE 50 milliGRAM(s) Oral every 4 hours PRN Rash and/or Itching  diphenhydrAMINE   Injectable 50 milliGRAM(s) IntraMuscular once PRN agitation  haloperidol     Tablet 7.5 milliGRAM(s) Oral every 4 hours PRN agitation  haloperidol    Injectable 7.5 milliGRAM(s) IntraMuscular once PRN agitation  LORazepam     Tablet 3 milliGRAM(s) Oral every 4 hours PRN agitation  LORazepam   Injectable 3 milliGRAM(s) IntraMuscular once PRN Agitation  
MEDICATIONS  (STANDING):  benztropine 0.5 milliGRAM(s) Oral two times a day  diVALproex ER 1000 milliGRAM(s) Oral at bedtime  OLANZapine 20 milliGRAM(s) Oral at bedtime  paliperidone ER 3 milliGRAM(s) Oral at bedtime    MEDICATIONS  (PRN):  diphenhydrAMINE 50 milliGRAM(s) Oral every 4 hours PRN Rash and/or Itching  diphenhydrAMINE   Injectable 50 milliGRAM(s) IntraMuscular once PRN agitation  haloperidol     Tablet 7.5 milliGRAM(s) Oral every 4 hours PRN agitation  haloperidol    Injectable 7.5 milliGRAM(s) IntraMuscular once PRN agitation  
MEDICATIONS  (STANDING):  clonazePAM  Tablet 1 milliGRAM(s) Oral two times a day  diVALproex DR 1000 milliGRAM(s) Oral at bedtime  OLANZapine 15 milliGRAM(s) Oral at bedtime    MEDICATIONS  (PRN):  diphenhydrAMINE 50 milliGRAM(s) Oral every 4 hours PRN Rash and/or Itching  diphenhydrAMINE   Injectable 50 milliGRAM(s) IntraMuscular once PRN agitation  haloperidol     Tablet 7.5 milliGRAM(s) Oral every 4 hours PRN agitation  haloperidol    Injectable 7.5 milliGRAM(s) IntraMuscular once PRN agitation  LORazepam     Tablet 3 milliGRAM(s) Oral every 4 hours PRN agitation  LORazepam   Injectable 3 milliGRAM(s) IntraMuscular once PRN Agitation  
MEDICATIONS  (STANDING):  benztropine 0.5 milliGRAM(s) Oral two times a day  clonazePAM  Tablet 1 milliGRAM(s) Oral at bedtime  diVALproex ER 1000 milliGRAM(s) Oral at bedtime  OLANZapine 20 milliGRAM(s) Oral at bedtime  paliperidone ER 3 milliGRAM(s) Oral at bedtime    MEDICATIONS  (PRN):  diphenhydrAMINE 50 milliGRAM(s) Oral every 4 hours PRN Rash and/or Itching  diphenhydrAMINE   Injectable 50 milliGRAM(s) IntraMuscular once PRN agitation  haloperidol     Tablet 7.5 milliGRAM(s) Oral every 4 hours PRN agitation  haloperidol    Injectable 7.5 milliGRAM(s) IntraMuscular once PRN agitation  LORazepam     Tablet 3 milliGRAM(s) Oral every 4 hours PRN agitation  LORazepam   Injectable 3 milliGRAM(s) IntraMuscular once PRN Agitation  
MEDICATIONS  (STANDING):  benztropine 0.5 milliGRAM(s) Oral two times a day  clonazePAM  Tablet 1 milliGRAM(s) Oral at bedtime  diVALproex ER 1000 milliGRAM(s) Oral at bedtime  OLANZapine 20 milliGRAM(s) Oral at bedtime  paliperidone ER 3 milliGRAM(s) Oral at bedtime    MEDICATIONS  (PRN):  diphenhydrAMINE 50 milliGRAM(s) Oral every 4 hours PRN Rash and/or Itching  diphenhydrAMINE   Injectable 50 milliGRAM(s) IntraMuscular once PRN agitation  haloperidol     Tablet 7.5 milliGRAM(s) Oral every 4 hours PRN agitation  haloperidol    Injectable 7.5 milliGRAM(s) IntraMuscular once PRN agitation  LORazepam     Tablet 3 milliGRAM(s) Oral every 4 hours PRN agitation  LORazepam   Injectable 3 milliGRAM(s) IntraMuscular once PRN Agitation  
MEDICATIONS  (STANDING):  clonazePAM  Tablet 0.5 milliGRAM(s) Oral daily  clonazePAM  Tablet 1 milliGRAM(s) Oral at bedtime  diVALproex ER 1000 milliGRAM(s) Oral at bedtime  OLANZapine 15 milliGRAM(s) Oral at bedtime    MEDICATIONS  (PRN):  diphenhydrAMINE 50 milliGRAM(s) Oral every 4 hours PRN Rash and/or Itching  diphenhydrAMINE   Injectable 50 milliGRAM(s) IntraMuscular once PRN agitation  haloperidol     Tablet 7.5 milliGRAM(s) Oral every 4 hours PRN agitation  haloperidol    Injectable 7.5 milliGRAM(s) IntraMuscular once PRN agitation  LORazepam     Tablet 3 milliGRAM(s) Oral every 4 hours PRN agitation  LORazepam   Injectable 3 milliGRAM(s) IntraMuscular once PRN Agitation  
MEDICATIONS  (STANDING):  clonazePAM  Tablet 0.5 milliGRAM(s) Oral daily  clonazePAM  Tablet 1 milliGRAM(s) Oral at bedtime  diVALproex ER 1000 milliGRAM(s) Oral at bedtime  OLANZapine 15 milliGRAM(s) Oral at bedtime    MEDICATIONS  (PRN):  diphenhydrAMINE 50 milliGRAM(s) Oral every 4 hours PRN Rash and/or Itching  diphenhydrAMINE   Injectable 50 milliGRAM(s) IntraMuscular once PRN agitation  haloperidol     Tablet 7.5 milliGRAM(s) Oral every 4 hours PRN agitation  haloperidol    Injectable 7.5 milliGRAM(s) IntraMuscular once PRN agitation  LORazepam     Tablet 3 milliGRAM(s) Oral every 4 hours PRN agitation  LORazepam   Injectable 3 milliGRAM(s) IntraMuscular once PRN Agitation

## 2021-01-14 NOTE — BH INPATIENT PSYCHIATRY PROGRESS NOTE - NSTXPROBMEDIC_PSY_ALL_CORE
MEDICATION/TREATMENT NON-COMPLIANCE

## 2021-02-04 ENCOUNTER — EMERGENCY (EMERGENCY)
Facility: HOSPITAL | Age: 46
LOS: 1 days | Discharge: HOME | End: 2021-02-04
Attending: EMERGENCY MEDICINE
Payer: MEDICAID

## 2021-02-04 VITALS
RESPIRATION RATE: 18 BRPM | DIASTOLIC BLOOD PRESSURE: 64 MMHG | TEMPERATURE: 99 F | OXYGEN SATURATION: 95 % | HEIGHT: 56 IN | HEART RATE: 114 BPM | SYSTOLIC BLOOD PRESSURE: 105 MMHG | WEIGHT: 160.06 LBS

## 2021-02-04 LAB
ALBUMIN SERPL ELPH-MCNC: 4.5 G/DL — SIGNIFICANT CHANGE UP (ref 3.3–5)
ALP SERPL-CCNC: 79 U/L — SIGNIFICANT CHANGE UP (ref 40–120)
ALT FLD-CCNC: 23 U/L — SIGNIFICANT CHANGE UP (ref 10–45)
ANION GAP SERPL CALC-SCNC: 14 MMOL/L — SIGNIFICANT CHANGE UP (ref 5–17)
APAP SERPL-MCNC: <15 UG/ML — SIGNIFICANT CHANGE UP (ref 10–30)
AST SERPL-CCNC: 15 U/L — SIGNIFICANT CHANGE UP (ref 10–40)
BASOPHILS # BLD AUTO: 0.04 K/UL — SIGNIFICANT CHANGE UP (ref 0–0.2)
BASOPHILS NFR BLD AUTO: 0.5 % — SIGNIFICANT CHANGE UP (ref 0–2)
BILIRUB SERPL-MCNC: 0.4 MG/DL — SIGNIFICANT CHANGE UP (ref 0.2–1.2)
BUN SERPL-MCNC: 10 MG/DL — SIGNIFICANT CHANGE UP (ref 7–23)
CALCIUM SERPL-MCNC: 9.8 MG/DL — SIGNIFICANT CHANGE UP (ref 8.4–10.5)
CHLORIDE SERPL-SCNC: 104 MMOL/L — SIGNIFICANT CHANGE UP (ref 96–108)
CO2 SERPL-SCNC: 22 MMOL/L — SIGNIFICANT CHANGE UP (ref 22–31)
CREAT SERPL-MCNC: 0.49 MG/DL — LOW (ref 0.5–1.3)
EOSINOPHIL # BLD AUTO: 0.02 K/UL — SIGNIFICANT CHANGE UP (ref 0–0.5)
EOSINOPHIL NFR BLD AUTO: 0.3 % — SIGNIFICANT CHANGE UP (ref 0–6)
ETHANOL SERPL-MCNC: SIGNIFICANT CHANGE UP MG/DL (ref 0–10)
GLUCOSE SERPL-MCNC: 104 MG/DL — HIGH (ref 70–99)
HCT VFR BLD CALC: 41.8 % — SIGNIFICANT CHANGE UP (ref 34.5–45)
HGB BLD-MCNC: 13.6 G/DL — SIGNIFICANT CHANGE UP (ref 11.5–15.5)
IMM GRANULOCYTES NFR BLD AUTO: 1.3 % — SIGNIFICANT CHANGE UP (ref 0–1.5)
LYMPHOCYTES # BLD AUTO: 1.7 K/UL — SIGNIFICANT CHANGE UP (ref 1–3.3)
LYMPHOCYTES # BLD AUTO: 22.3 % — SIGNIFICANT CHANGE UP (ref 13–44)
MCHC RBC-ENTMCNC: 29.3 PG — SIGNIFICANT CHANGE UP (ref 27–34)
MCHC RBC-ENTMCNC: 32.5 GM/DL — SIGNIFICANT CHANGE UP (ref 32–36)
MCV RBC AUTO: 90.1 FL — SIGNIFICANT CHANGE UP (ref 80–100)
MONOCYTES # BLD AUTO: 0.66 K/UL — SIGNIFICANT CHANGE UP (ref 0–0.9)
MONOCYTES NFR BLD AUTO: 8.7 % — SIGNIFICANT CHANGE UP (ref 2–14)
NEUTROPHILS # BLD AUTO: 5.11 K/UL — SIGNIFICANT CHANGE UP (ref 1.8–7.4)
NEUTROPHILS NFR BLD AUTO: 66.9 % — SIGNIFICANT CHANGE UP (ref 43–77)
NRBC # BLD: 0 /100 WBCS — SIGNIFICANT CHANGE UP (ref 0–0)
PLATELET # BLD AUTO: 274 K/UL — SIGNIFICANT CHANGE UP (ref 150–400)
POTASSIUM SERPL-MCNC: 4.1 MMOL/L — SIGNIFICANT CHANGE UP (ref 3.5–5.3)
POTASSIUM SERPL-SCNC: 4.1 MMOL/L — SIGNIFICANT CHANGE UP (ref 3.5–5.3)
PROT SERPL-MCNC: 7.5 G/DL — SIGNIFICANT CHANGE UP (ref 6–8.3)
RBC # BLD: 4.64 M/UL — SIGNIFICANT CHANGE UP (ref 3.8–5.2)
RBC # FLD: 13.2 % — SIGNIFICANT CHANGE UP (ref 10.3–14.5)
SALICYLATES SERPL-MCNC: <2 MG/DL — LOW (ref 15–30)
SARS-COV-2 RNA SPEC QL NAA+PROBE: SIGNIFICANT CHANGE UP
SODIUM SERPL-SCNC: 140 MMOL/L — SIGNIFICANT CHANGE UP (ref 135–145)
VALPROATE SERPL-MCNC: 10 UG/ML — LOW (ref 50–100)
WBC # BLD: 7.63 K/UL — SIGNIFICANT CHANGE UP (ref 3.8–10.5)
WBC # FLD AUTO: 7.63 K/UL — SIGNIFICANT CHANGE UP (ref 3.8–10.5)

## 2021-02-04 PROCEDURE — U0005: CPT

## 2021-02-04 PROCEDURE — 99284 EMERGENCY DEPT VISIT MOD MDM: CPT

## 2021-02-04 PROCEDURE — 87635 SARS-COV-2 COVID-19 AMP PRB: CPT

## 2021-02-04 PROCEDURE — 80164 ASSAY DIPROPYLACETIC ACD TOT: CPT

## 2021-02-04 PROCEDURE — 85025 COMPLETE CBC W/AUTO DIFF WBC: CPT

## 2021-02-04 PROCEDURE — 90792 PSYCH DIAG EVAL W/MED SRVCS: CPT | Mod: 95

## 2021-02-04 PROCEDURE — 99285 EMERGENCY DEPT VISIT HI MDM: CPT

## 2021-02-04 PROCEDURE — 80053 COMPREHEN METABOLIC PANEL: CPT

## 2021-02-04 PROCEDURE — 80307 DRUG TEST PRSMV CHEM ANLYZR: CPT

## 2021-02-04 PROCEDURE — 93010 ELECTROCARDIOGRAM REPORT: CPT

## 2021-02-04 PROCEDURE — 93005 ELECTROCARDIOGRAM TRACING: CPT

## 2021-02-04 NOTE — ED PROVIDER NOTE - CLINICAL SUMMARY MEDICAL DECISION MAKING FREE TEXT BOX
Pt calm, denying complaints other than slight headache, but becomes very upset when asked if has si/hi, auditory or visual hallucinations. Concern for medication non-compliance and decompensated psychosis. Check EKG. Labs. Psych consult. Appreciate psych recs. - Franklyn Milian MD

## 2021-02-04 NOTE — ED BEHAVIORAL HEALTH NOTE - BEHAVIORAL HEALTH NOTE
===================  PRE-HOSPITAL COURSE  ===================  SOURCE:  ELIZABETH Hackett, ED documentation   DETAILS:  Pt. BIB EMS from [facility] reportedly non-compliant with medications and wandering.     ============  ED COURSE   ============  SOURCE:  ELIZABETH Hackett, ED documentation   ARRIVAL:  EMS, unaccompanied   BELONGINGS:  no belongings of note   BEHAVIOR: Pt. arrived to ED alert and oriented, but stating she is there for "slight headache" and "fever for a long time".  Pt. has been cooperative and calm in ED, complied with triage processes.  Pt. described to be in fair hygiene.  Pt. eye contact fair, speech of normal rate and volume (Mandarin  utilized), thoughts seemingly preoccupied with thoughts illogical/abnormal.  Pt. denies SI/HI and AH/VH, has no complaints aside from mentioned headache and reported fever.  Pt. resting comfortably while awaiting evaluation.    TREATMENT:  no medications given, no security intervention required   VISITORS:  no visitors present in ED     ========================  COLLATERAL  ========================  NAME: [facility]   NUMBER: [phone]  RELATIONSHIP: after hours on call   COMMENTS: left voicemail requesting callback     NAME: ACT   NUMBER: [phone]  RELATIONSHIP: answering service   COMMENTS: left voicemail requesting callback     NAME: Anna Coleman  NUMBER: [phone]  RELATIONSHIP: ACT    COMMENTS: left voicemail requesting callback       NAME: Rosalva  NUMBER: [phone]  RELATIONSHIP: ACT Supervisor   COMMENTS: Rosalva states that she saw pt. today in [facility] and she at the time presented as catatonic, very rigid, unresponsive to questions, staring blankly.  Despite attempts with  this continued, and residence and ACT team became concerned and thought it would be best to have pt. evaluated.  Staff at home reported pt. medication compliance has been spotty and they were unsure if this could be contributing to her presentation.  If pt. is cleared she should be able to return back to the home, residence direct line [phone]; however this is not pt's baseline as far as they are concerned.     Med list : Klonopin 1mg bedtime, Invega 6mg bedtime, Cogentin 0.5mg BID, Depakote 1000mg bedtime, Zyprexa 15mg bedtime, Invega Trinza q3 months (next due March 15 2021)              COVID Exposure Screen- ED  1.        *Has the patient had a COVID-19 test in the last 21 days?  (   ) Yes   (  ) No   (  ) Unknown- Reason: ______  IF YES PROCEED TO QUESTION #2. IF NO OR UNKNOWN THEN PLEASE SKIP TO QUESTION #3.  2.        Date of test:   3.        Do you know the result? (   ) Negative   (  ) Positive   (  ) No result available  4.        *In the past 14 days, has the patient been around anyone with a positive COVID-19 test?*  (  ) Yes   (X  ) No   (  ) Unknown- Reason (e.g. collateral uncertain, refusing to answer, etc.):  ______  IF YES PROCEED TO QUESTION #5. IF NO or UNKNOWN, PLEASE SKIP TO QUESTION #10  5.        Was the patient within 6 feet of them for at least 15 minutes? (  ) Yes   (  ) No   (  ) Unknown- Reason: ______   6.        Did the patient provide care for them? (  ) Yes   (  ) No   (  ) Unknown- Reason: ______   7.        Did the patient have direct physical contact with them (touched, hugged, or kissed them)? (  ) Yes   (  ) No    (  ) Unknown- Reason: ______   8.        Did the patient share eating or drinking utensils with them? (  ) Yes   (  ) No    (  ) Unknown- Reason: ______   9.        Have they sneezed, coughed, or somehow got respiratory droplets on the patient? (  ) Yes   (  ) No    (  ) Unknown- Reason: ______   10.     *Have you been out of [place] within the past 14 days?*  (  ) Yes   (  X) No   (  ) Unknown- Reason (e.g. patient uncertain, sedated, refusing to answer, etc.): _______  IF YES PLEASE ANSWER THE FOLLOWING QUESTIONS:  11.     Which state/country have you been to? ______  12.     Were you there over 24 hours? (  ) Yes   (  ) No    (  ) Unknown- Reason: ______  13.     Date of return to [facility]: ______.
Patient/Caregiver provided printed discharge information.
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
## 2021-02-04 NOTE — ED ADULT NURSE REASSESSMENT NOTE - NS ED NURSE REASSESS COMMENT FT1
pt. was interviewed via Telepsych w/  Mandarin . pt. maintained on 1:1 CO for psych eval/aggression.

## 2021-02-04 NOTE — ED BEHAVIORAL HEALTH ASSESSMENT NOTE - CURRENT MEDICATION
zyprexa 15mg daily  klon 1mg daily  depakote 500mg bid   invega sustenna 234 RAMOS monthly last received 12/15/2020 Pt has been noncompliant for unknown period of time but is supposed to be on:  Klonopin 1mg bedtime, Invega 6mg bedtime, Cogentin 0.5mg BID, Depakote 1000mg bedtime, Zyprexa 15mg bedtime, Invega Trinza q3 months (next due March 15 2021)

## 2021-02-04 NOTE — ED PROVIDER NOTE - OBJECTIVE STATEMENT
46F, pmh schizophrenia, schizoaffective disorder, biba from group home with agitation, non-compliance with medication. Pt denies any complaints other than "I have a slight headache," and "I have had a fever for a long time." Denies cp, sob, abd pain.    Mandarin  used [id number], pmh schizophrenia, schizoaffective disorder, biba from group home with agitation, non-compliance with medication. Pt denies any complaints other than "I have a slight headache," and "I have had a fever for a long time." Denies cp, sob, abd pain. Denies SI/HI. Denies auditory or visual hallucinations.    Mandarin  used [id number]

## 2021-02-04 NOTE — ED BEHAVIORAL HEALTH ASSESSMENT NOTE - NSBHMSESPEECH_PSY_A_CORE
Normal volume, rate, productivity, spontaneity and articulation Abnormal as indicated, otherwise normal...

## 2021-02-04 NOTE — ED BEHAVIORAL HEALTH ASSESSMENT NOTE - NS ED BHA HOMICIDALITY PRESENT AGGRESSION OTHERS PAST MONTH
Initial Anesthesia Post-op Note    Patient: Shanda Mcgee  Procedure(s) Performed: DIAGNOSTIC HYSTEROSCOPY, DILATATION AND CURETTAGE, POSSIBLE MYOMECTOMY, AND NOVASURE ENDOMETRIAL ABLATIONDIAGNOSTIC HYSTEROSCOPY, DILATATION AND CURETTAGE, POSSIBLE MYOMECTOMY, AND NOVASURE ENDOMETRIAL ABLATIONDIAGNOSTIC HYSTEROSCOPY, DILATATION AND CURETTAGE, POSSIBLE MYOMECTOMY, AND NOVASURE ENDOMETRIAL ABLATIONDIAGNOSTIC HYSTEROSCOPY, DILATATION AND CURETTAGE, POSSIBLE MYOMECTOMY, AND NOVASURE ENDOMETRIAL ABLATION  Anesthesia type: General    Vitals Value Taken Time   Temp 36.2 °C (97.1 °F) 3/9/2020  8:23 AM   Pulse 97 3/9/2020  8:23 AM   Resp 16 3/9/2020  8:23 AM   SpO2 98 % 3/9/2020  8:23 AM   /100 3/9/2020  8:23 AM       Last 24 I/O:     Intake/Output Summary (Last 24 hours) at 3/9/2020 0825  Last data filed at 3/9/2020 0812  Gross per 24 hour   Intake 900 ml   Output --   Net 900 ml         Patient Location: PACU Phase 1  Post-op Vital Signs:stable  Level of Consciousness: participates in exam, awake and answers questions appropriately  Respiratory Status: spontaneous ventilation and face mask  Cardiovascular blood pressure returned to baseline  Hydration: euvolemic  Pain Management: well controlled  Handoff: Handoff to receiving clinician was performed and questions were answered  Nausea: None  Airway Patency:patent  Post-op Assessment: awake, alert, appropriately conversant, or baseline, no complications, patient tolerated procedure well with no complications and evidence of recall    
None known

## 2021-02-04 NOTE — ED PROVIDER NOTE - NS ED ROS FT
Constitutional: No fever or chills  Eyes: No visual changes  HEENT: No throat pain  CV: No chest pain  Resp: No SOB no cough  GI: No abd pain, nausea or vomiting  : No dysuria  MSK: No musculoskeletal pain  Skin: No rash  Neuro: "slight headache"

## 2021-02-04 NOTE — ED BEHAVIORAL HEALTH ASSESSMENT NOTE - OTHER
group home peers not tested impoverished pending bed availability external billing group home [facility] [phone] unable to assess, limited engagement in interview deferred would not report mood
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
## 2021-02-04 NOTE — ED BEHAVIORAL HEALTH ASSESSMENT NOTE - VIOLENCE RISK FACTORS:
Affective dysregulation/Lack of insight into violence risk/need for treatment Noncompliance with treatment

## 2021-02-04 NOTE — ED ADULT NURSE NOTE - OBJECTIVE STATEMENT
SAMMY INTERPRTER [id number]: Pt is a 46 year old [facility] resident BIB EMS after her psychiatris activated 911. Pt has been inconsistently complying with her medications, is defiant and has been walking away. Pt denied suicidal and homicidal ideations, delusions or hallucinations. Pt denied use of illicit drugs or alcohol. Pt stated she does not have medical problems.

## 2021-02-04 NOTE — ED ADULT TRIAGE NOTE - CHIEF COMPLAINT QUOTE
Patient sent from [facility] at [place] for psych eval, non-complaint with medications

## 2021-02-04 NOTE — ED PROVIDER NOTE - INTERPRETATION
sinus tachycardia Burow's Graft Text: The defect edges were debeveled with a #15 scalpel blade.  Given the location of the defect, shape of the defect, the proximity to free margins and the presence of a standing cone deformity a Burow's skin graft was deemed most appropriate. The standing cone was removed and this tissue was then trimmed to the shape of the primary defect. The adipose tissue was also removed until only dermis and epidermis were left.  The skin margins of the secondary defect were undermined to an appropriate distance in all directions utilizing iris scissors.  The secondary defect was closed with interrupted buried subcutaneous sutures.  The skin edges were then re-apposed with running  sutures.  The skin graft was then placed in the primary defect and oriented appropriately.

## 2021-02-04 NOTE — ED BEHAVIORAL HEALTH ASSESSMENT NOTE - OTHER PAST PSYCHIATRIC HISTORY (INCLUDE DETAILS REGARDING ONSET, COURSE OF ILLNESS, INPATIENT/OUTPATIENT TREATMENT)
AOT order from 6/28/2019 to 6/28/2020  ACT team: [facility]   Inpatient Hosp: [facility] 1/2016, [place] 2/2016 (66 days), [facility] 4/2016 (433 days), [facility] 1/2020 (27 days). AOT order from 6/28/2019 to 6/28/2020  ACT team: [facility]   Inpatient Hosp: [facility] 1/2016, [place] 2/2016 (66 days), [facility] 4/2016 (433 days), [facility] 1/2020 (27 days); [facility] 12/17/2020-1/14/2021 (28 days).

## 2021-02-04 NOTE — ED ADULT NURSE NOTE - CHIEF COMPLAINT QUOTE
Patient sent from [facility] at [place] for psych eval, non-complaint with medications

## 2021-02-04 NOTE — ED BEHAVIORAL HEALTH ASSESSMENT NOTE - PSYCHIATRIC ISSUES AND PLAN (INCLUDE STANDING AND PRN MEDICATION)
restart zyprexa 15mg daily, klonopin 1mg daily, VPA 500mg bid schizoaffective d/o. she has been off medications for unknown period of time but she is supposed to be on: Klonopin 1mg bedtime, Invega 6mg bedtime, Cogentin 0.5mg BID, Depakote 1000mg bedtime, Zyprexa 15mg bedtime, Invega Trinza q3 months (next due March 15 2021).
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
## 2021-02-04 NOTE — ED BEHAVIORAL HEALTH ASSESSMENT NOTE - DETAILS
discussed with SW from residence pending bed availability per chart, probable past suicide attempt but nothing recent hx of aggression in setting of med noncompliance in past per chart pt reports fever even though vitals indicate afebrile spoke with ED attending spoke with ACT supervisor Rosalva [phone] per chart, ?past suicide attempt but nothing recent

## 2021-02-05 ENCOUNTER — INPATIENT (INPATIENT)
Facility: HOSPITAL | Age: 46
LOS: 32 days | Discharge: HOME | End: 2021-03-10
Attending: PSYCHIATRY & NEUROLOGY | Admitting: PSYCHIATRY & NEUROLOGY
Payer: MEDICAID

## 2021-02-05 VITALS — HEART RATE: 99 BPM | TEMPERATURE: 98 F

## 2021-02-05 VITALS
RESPIRATION RATE: 18 BRPM | DIASTOLIC BLOOD PRESSURE: 75 MMHG | SYSTOLIC BLOOD PRESSURE: 110 MMHG | OXYGEN SATURATION: 96 % | HEART RATE: 100 BPM | TEMPERATURE: 99 F

## 2021-02-05 DIAGNOSIS — F20.9 SCHIZOPHRENIA, UNSPECIFIED: ICD-10-CM

## 2021-02-05 PROCEDURE — 99222 1ST HOSP IP/OBS MODERATE 55: CPT

## 2021-02-05 RX ORDER — HALOPERIDOL DECANOATE 100 MG/ML
5 INJECTION INTRAMUSCULAR ONCE
Refills: 0 | Status: DISCONTINUED | OUTPATIENT
Start: 2021-02-05 | End: 2021-03-10

## 2021-02-05 RX ORDER — DIPHENHYDRAMINE HCL 50 MG
50 CAPSULE ORAL EVERY 8 HOURS
Refills: 0 | Status: DISCONTINUED | OUTPATIENT
Start: 2021-02-05 | End: 2021-03-10

## 2021-02-05 RX ORDER — DIPHENHYDRAMINE HCL 50 MG
50 CAPSULE ORAL ONCE
Refills: 0 | Status: DISCONTINUED | OUTPATIENT
Start: 2021-02-05 | End: 2021-03-10

## 2021-02-05 RX ORDER — OLANZAPINE 15 MG/1
10 TABLET, FILM COATED ORAL AT BEDTIME
Refills: 0 | Status: DISCONTINUED | OUTPATIENT
Start: 2021-02-05 | End: 2021-02-10

## 2021-02-05 RX ORDER — BENZTROPINE MESYLATE 1 MG
0.5 TABLET ORAL
Refills: 0 | Status: DISCONTINUED | OUTPATIENT
Start: 2021-02-05 | End: 2021-03-10

## 2021-02-05 RX ORDER — CLONAZEPAM 1 MG
1 TABLET ORAL AT BEDTIME
Refills: 0 | Status: DISCONTINUED | OUTPATIENT
Start: 2021-02-05 | End: 2021-02-10

## 2021-02-05 RX ORDER — DIVALPROEX SODIUM 500 MG/1
500 TABLET, DELAYED RELEASE ORAL AT BEDTIME
Refills: 0 | Status: DISCONTINUED | OUTPATIENT
Start: 2021-02-05 | End: 2021-02-07

## 2021-02-05 RX ORDER — PALIPERIDONE 1.5 MG/1
3 TABLET, EXTENDED RELEASE ORAL AT BEDTIME
Refills: 0 | Status: DISCONTINUED | OUTPATIENT
Start: 2021-02-05 | End: 2021-03-08

## 2021-02-05 RX ORDER — HALOPERIDOL DECANOATE 100 MG/ML
5 INJECTION INTRAMUSCULAR EVERY 8 HOURS
Refills: 0 | Status: DISCONTINUED | OUTPATIENT
Start: 2021-02-05 | End: 2021-03-10

## 2021-02-05 NOTE — BH PATIENT PROFILE - HOME MEDICATIONS
paliperidone 3 mg oral tablet, extended release , 1 tab(s) orally once a day (at bedtime)  clonazePAM 1 mg oral tablet , 1 tab(s) orally once a day (at bedtime) MDD:1mg  OLANZapine 20 mg oral tablet , 1 tab(s) orally once a day (at bedtime)  benztropine 0.5 mg oral tablet , 1 tab(s) orally 2 times a day  divalproex sodium 500 mg oral tablet, extended release , 2 tab(s) orally once a day (at bedtime)

## 2021-02-05 NOTE — BH INPATIENT PSYCHIATRY ASSESSMENT NOTE - NSBHASSESSSUMMFT_PSY_ALL_CORE
46 F, Mandarin-speaking, domiciled at a group home [facility], PMHx of allergic rhinitis, GERD, PPHx of schizoaffective disorder, multiple hospitalizations including a 440-day hospitalization from 4/2016-6/2017, most recently hospitalized in 1/2021 for psychosis, followed by ACT; brought to the hospital by EMS activated by her residence for decompensation in setting of med non adherence. Per ACT supervisor, she was unresponsive, staring, very rigid, and different from baseline function/presentation.    --Restart psychotropic medications:  Invega 3mg PO at bedtime, Zyprexa Zydis 10mg PO at bedtime, Cogentin 0.5mg PO BID, Depakote ER 500mg PO at bedtime with Invega Trinza 819mg IM due on 3/15/21  --Group therapy as tolerated

## 2021-02-05 NOTE — ED ADULT NURSE REASSESSMENT NOTE - NS ED NURSE REASSESS COMMENT FT1
pt. c/o abdominal discomfort, but when mandarin  (Terra  [id number]) was used to assess her complain,  stated that pt. is not making sense and pt. stated that her abdomen is getting bigger while sitting in bed". also, pt. was made aware that  she will be transferred to a [facility] hospital  when bed becomes available. 1:1 CO for aggression maintained. was given a sandwich to eat.

## 2021-02-05 NOTE — BH SOCIAL WORK INITIAL PSYCHOSOCIAL EVALUATION - NSPROGENSOURCEINFOFT_PSY_ALL_CORE
information was gathered from pt and EMR; pt was disorganized at time of assessment / poor historian

## 2021-02-05 NOTE — BH INPATIENT PSYCHIATRY ASSESSMENT NOTE - HPI (INCLUDE ILLNESS QUALITY, SEVERITY, DURATION, TIMING, CONTEXT, MODIFYING FACTORS, ASSOCIATED SIGNS AND SYMPTOMS)
HPI: 46 F, Mandarin-speaking, domiciled at a group home [facility], PMHx of allergic rhinitis, GERD, PPHx of schizoaffective disorder, multiple hospitalizations including a 440-day hospitalization from 4/2016-6/2017, most recently hospitalized in 1/2021 for psychosis, followed by ACT; brought to the hospital by EMS activated by her residence for decompensation in setting of med non adherence. Per ACT supervisor, she was unresponsive, staring, very rigid, and different from baseline function/presentation.    Patient interviewed with Mandarin  ([employer]) [phone]. Patient was very mildly irritable but was able to participate fully with interview (apparently, in the ED, this was not the case). Patient states that she is in the hospital because there is something wrong with her intestine or gut. She states that her mood recently has been great and that she has had no problems with sleep, energy, or appetite. She denies any recent suicidal ideation, intent, or plan. She denies any homicidal, violent, or aggressive ideation, intent, or plan. She denies lifetime manic symptoms. She denies psychotic symptoms including auditory hallucinations, visual hallucinations, paranoid ideation, ideas of reference. Patient does not appear paranoid during interview; apparently, she exhibited paranoid behavior in ED.    PPHx: diagnosis of schizoaffective disorder, multiple hospitalizations including 440 day hospitalization, most recently hospitalized 1/21 for psychosis, followed by ACT (supervisor Rosalva ), on clonazepam 1 mg QHS, paliperidone 6 mg QHS, benztropine 0.5 mg BID, divalproex 1000 mg QHS, olanzapine 15 mg QHS, Trinza [id number] next due 3/15/21; per patient, she has not been taking any medications.     PMHx: allergic rhinitis, GERD, does not take any medications.    Allergies:  none    Family history: no history of psychiatric illness in family per patient    Substance use history: no substance use, including alcohol, tobacco, marijuana, cocaine, heroin    Social history: patient lives in group home, is unemployed, states that father took her to the hospital but she's not sure where he lives or if he lives in the area, states that she does not think she has any family in the area    Vitals: tachy to 104 in ED, otherwise stable    Labs: unconcerning, except for VPA level 10, patient likely has not been taking    Studies: EKG showed QTc unconcerning    MSE:   calm, cooperative, well developed, appears age, fair to poor hygiene and grooming, poor to fair eye contact, fair relatedness, normal impulse control during interview, no abnormal movements seen, speech was normal volume, normal rate, decreased productivity, reported mood was good, affect was blunted to neutral, affect was not congruent with stated mood, thought process was linear and generally logical, not disorganized, some poverty of thought, thought content was unremarkable, no apparent perceptual disturbance, normal concentration, poor insight, poor judgment    Assessment:  46 F, Mandarin-speaking, domiciled at a group [facility], PMHx of allergic rhinitis, GERD, PPHx of schizoaffective disorder, multiple hospitalizations including a 440-day hospitalization from 4/2016-6/2017, most recently hospitalized in 1/2021 for psychosis, followed by ACT; brought to the hospital by EMS activated by her residence for decompensation in setting of med non adherence. Per ACT supervisor, she was unresponsive, staring, very rigid, and different from baseline function/presentation.    Likely exacerbation of underlying psychotic disorder in the context of medication non adherence, although patient's presentation on interview appeared to be significantly better than observed by ACT team or in ED.     Risk Assessment: Low acute risk of harm to self or others. Risk factors include existing psychotic and mood disorder, poor insight and judgment, medication non adherence, multiple prior hospitalizations. Protective factors include denies SHVIIP at this time, no history of violence, no history of SAs, stably domiciled.    Pt seen utilizing [employer]  ID #:  [id number].  On interview, patient irritable at times, disorganized,  cannot make out what she is saying at times due to disorganization.  Pt disheveled with poor self care.  Pt reports not sleeping at night.  Pt denies SI/HI/I/P or AH/VH or paranoia.  Pt reports eating well. Pt reports she was admitted because her stomach is growing.

## 2021-02-05 NOTE — BH CHART NOTE - NSEVENTNOTEFT_PSY_ALL_CORE
CC: "There's something wrong with my gut."    HPI: 46 F, Mandarin-speaking, domiciled at a group home [facility], PMHx of allergic rhinitis, GERD, PPHx of schizoaffective disorder, multiple hospitalizations including a 440-day hospitalization from 4/2016-6/2017, most recently hospitalized in 1/2021 for psychosis, followed by ACT; brought to the hospital by EMS activated by her residence for decompensation in setting of med non adherence. Per ACT supervisor, she was unresponsive, staring, very rigid, and different from baseline function/presentation.    Patient interviewed with Mandarin  ([employer]) [phone]. Patient was very mildly irritable but was able to participate fully with interview (apparently, in the ED, this was not the case). Patient states that she is in the hospital because there is something wrong with her intestine or gut. She states that her mood recently has been great and that she has had no problems with sleep, energy, or appetite. She denies any recent suicidal ideation, intent, or plan. She denies any homicidal, violent, or aggressive ideation, intent, or plan. She denies lifetime manic symptoms. She denies psychotic symptoms including auditory hallucinations, visual hallucinations, paranoid ideation, ideas of reference. Patient does not appear paranoid during interview; apparently, she exhibited paranoid behavior in ED.    PPHx: diagnosis of schizoaffective disorder, multiple hospitalizations including 440 day hospitalization, most recently hospitalized 1/21 for psychosis, followed by ACT (supervisor Rosalva ), on clonazepam 1 mg QHS, paliperidone 6 mg QHS, benztropine 0.5 mg BID, divalproex 1000 mg QHS, olanzapine 15 mg QHS, Trinza [id number] next due 3/15/21; per patient, she has not been taking any medications.     PMHx: allergic rhinitis, GERD, does not take any medications.    Allergies:  none    Family history: no history of psychiatric illness in family per patient    Substance use history: no substance use, including alcohol, tobacco, marijuana, cocaine, heroin    Social history: patient lives in group home, is unemployed, states that father took her to the hospital but she's not sure where he lives or if he lives in the area, states that she does not think she has any family in the area    Vitals: tachy to 104 in ED, otherwise stable    Labs: unconcerning, except for VPA level 10, patient likely has not been taking    Studies: EKG showed QTc unconcerning    MSE:   calm, cooperative, well developed, appears age, fair to poor hygiene and grooming, poor to fair eye contact, fair relatedness, normal impulse control during interview, no abnormal movements seen, speech was normal volume, normal rate, decreased productivity, reported mood was good, affect was blunted to neutral, affect was not congruent with stated mood, thought process was linear and generally logical, not disorganized, some poverty of thought, thought content was unremarkable, no apparent perceptual disturbance, normal concentration, poor insight, poor judgment    Assessment:  46 F, Mandarin-speaking, domiciled at a group [facility], PMHx of allergic rhinitis, GERD, PPHx of schizoaffective disorder, multiple hospitalizations including a 440-day hospitalization from 4/2016-6/2017, most recently hospitalized in 1/2021 for psychosis, followed by ACT; brought to the hospital by EMS activated by her residence for decompensation in setting of med non adherence. Per ACT supervisor, she was unresponsive, staring, very rigid, and different from baseline function/presentation.    Likely exacerbation of underlying psychotic disorder in the context of medication non adherence, although patient's presentation on interview appeared to be significantly better than observed by ACT team or in ED.     Risk Assessment: Low acute risk of harm to self or others. Risk factors include existing psychotic and mood disorder, poor insight and judgment, medication non adherence, multiple prior hospitalizations. Protective factors include denies SHVIIP at this time, no history of violence, no history of SAs, stably domiciled.    Plan:  Legal: 9.27  Safety: Routine, low acute risk of harm to self or others  Psych:  - given strongly evident medication non adherence, will hold medications for now, defer to primary team to start standing medications in a few hours  - PRN Ativan/Haldol/Benadryl for agitation, PO/IM  Med:  - no medical interventions at this time  Dispo:  - per primary team

## 2021-02-05 NOTE — BH INPATIENT PSYCHIATRY ASSESSMENT NOTE - RISK ASSESSMENT
increased risk of aggression due to unpredictable behavior, impulsivity, aggression earlier today, lack of insight, medication non-adherence

## 2021-02-05 NOTE — BH SOCIAL WORK INITIAL PSYCHOSOCIAL EVALUATION - NSBHCHILDBEHAVIOR_PSY_ALL_CORE
none notes; possible migration [place] an adult to [place], but information not available at this time./Very social/Unable to obtain

## 2021-02-05 NOTE — BH PATIENT PROFILE - NSPROMEDSHERBAL_GEN_A_NUR
,[email or web address],DirectAddress_[email or web address]
no

## 2021-02-05 NOTE — CHART NOTE - NSCHARTNOTEFT_GEN_A_CORE
Screening Medical Evaluation  Patient Admitted from: [facility] ED    ZH admitting diagnosis: Schizophrenia     PAST MEDICAL & SURGICAL HISTORY:  Schizophrenia    Schizoaffective disorder    No significant past surgical history          Allergies    No Known Allergies    Intolerances        Social History:     FAMILY HISTORY:      MEDICATIONS  (STANDING):  benztropine 0.5 milliGRAM(s) Oral two times a day  clonazePAM  Tablet 1 milliGRAM(s) Oral at bedtime  diVALproex  milliGRAM(s) Oral at bedtime  OLANZapine Disintegrating Tablet 10 milliGRAM(s) Oral at bedtime  paliperidone ER 3 milliGRAM(s) Oral at bedtime    MEDICATIONS  (PRN):  diphenhydrAMINE 50 milliGRAM(s) Oral every 8 hours PRN Rash and/or Itching, agitation, EPS  diphenhydrAMINE   Injectable 50 milliGRAM(s) IntraMuscular once PRN agitation  haloperidol     Tablet 5 milliGRAM(s) Oral every 8 hours PRN agitation  haloperidol    Injectable 5 milliGRAM(s) IntraMuscular once PRN agitation  LORazepam     Tablet 2 milliGRAM(s) Oral every 8 hours PRN agitation  LORazepam   Injectable 2 milliGRAM(s) IntraMuscular once PRN agitation      Vital Signs Last 24 Hrs  T(C): 37.4 (05 Feb 2021 08:41), Max: 37.4 (05 Feb 2021 08:41)  T(F): 99.3 (05 Feb 2021 08:41), Max: 99.3 (05 Feb 2021 08:41)  HR: 99 (05 Feb 2021 06:47) (98 - 101)  BP: 110/75 (05 Feb 2021 02:22) (110/75 - 125/84)  BP(mean): --  RR: 18 (05 Feb 2021 02:22) (18 - 18)  SpO2: 96% (05 Feb 2021 02:22) (95% - 99%)  CAPILLARY BLOOD GLUCOSE            PHYSICAL EXAM:  GENERAL: NAD, well-developed  HEAD:  Atraumatic, Normocephalic  EYES: EOMI, PERRLA, conjunctiva and sclera clear  NECK: Supple, No JVD  CHEST/LUNG: Clear to auscultation bilaterally; No wheeze  HEART: Regular rate and rhythm; No murmurs, rubs, or gallops  ABDOMEN: Soft, Nontender, Nondistended; Bowel sounds present  EXTREMITIES:  2+ Peripheral Pulses, No clubbing, cyanosis, or edema  PSYCH: AAOx3  NEUROLOGY: non-focal  SKIN: No rashes or lesions    LABS:                        13.6   7.63  )-----------( 274      ( 04 Feb 2021 18:35 )             41.8     02-04    140  |  104  |  10  ----------------------------<  104<H>  4.1   |  22  |  0.49<L>    Ca    9.8      04 Feb 2021 18:35    TPro  7.5  /  Alb  4.5  /  TBili  0.4  /  DBili  x   /  AST  15  /  ALT  23  /  AlkPhos  79  02-04              RADIOLOGY & ADDITIONAL TESTS:    Assessment and Plan: 47 y/o Mandarin Speaking Female with PMHx Schizophrenia , Schizoaffective disorder present for agitation, non-compliance with medication from Group Home. Pt denies chest pain, SOB, STOVER, PND, orthopnea, palpitations, diaphoresis, lightheadedness, dizziness, syncope, increased lowr extremity edema, fever chills, malaise, myalgias, anorexia, weight changes ( loss or gain), nightsweats, generalized fatigue abdominal pain, N/V/C/D BRBPR, melena, urinary symptoms, cough, and wheezing.      #[id number]    # Agitation  - c/w home meds   - continue care per psych team     #Schizophrenia   - continue care per psych team   - c/w current psych meds Screening Medical Evaluation  Patient Admitted from: [facility] ED    [facility] admitting diagnosis: Schizophrenia     PAST MEDICAL & SURGICAL HISTORY:  Schizophrenia    Schizoaffective disorder    No significant past surgical history          Allergies    No Known Allergies    Intolerances        Social History:  Tobacco Usage:  (x ) never smoked   ( ) former smoker  ( ) current smoker; Packs per day:   Alcohol Usage: (x ) none  ( ) occasional ( ) 2-3 times a week ( ) daily; Last drink:   Recreational drugs (x ) None  Lives at Group home  Denies Recent travel     FAMILY HISTORY:      MEDICATIONS  (STANDING):  benztropine 0.5 milliGRAM(s) Oral two times a day  clonazePAM  Tablet 1 milliGRAM(s) Oral at bedtime  diVALproex  milliGRAM(s) Oral at bedtime  OLANZapine Disintegrating Tablet 10 milliGRAM(s) Oral at bedtime  paliperidone ER 3 milliGRAM(s) Oral at bedtime    MEDICATIONS  (PRN):  diphenhydrAMINE 50 milliGRAM(s) Oral every 8 hours PRN Rash and/or Itching, agitation, EPS  diphenhydrAMINE   Injectable 50 milliGRAM(s) IntraMuscular once PRN agitation  haloperidol     Tablet 5 milliGRAM(s) Oral every 8 hours PRN agitation  haloperidol    Injectable 5 milliGRAM(s) IntraMuscular once PRN agitation  LORazepam     Tablet 2 milliGRAM(s) Oral every 8 hours PRN agitation  LORazepam   Injectable 2 milliGRAM(s) IntraMuscular once PRN agitation      Vital Signs Last 24 Hrs  T(C): 37.4 (05 Feb 2021 08:41), Max: 37.4 (05 Feb 2021 08:41)  T(F): 99.3 (05 Feb 2021 08:41), Max: 99.3 (05 Feb 2021 08:41)  HR: 99 (05 Feb 2021 06:47) (98 - 101)  BP: 110/75 (05 Feb 2021 02:22) (110/75 - 125/84)  BP(mean): --  RR: 18 (05 Feb 2021 02:22) (18 - 18)  SpO2: 96% (05 Feb 2021 02:22) (95% - 99%)  CAPILLARY BLOOD GLUCOSE      PHYSICAL EXAM:  GENERAL: NAD, well-developed  HEAD:  Atraumatic, Normocephalic  EYES: EOMI, PERRLA, conjunctiva and sclera clear  NECK: Supple, No JVD  CHEST/LUNG: Clear to auscultation bilaterally; No wheeze  HEART: Regular rate and rhythm; No murmurs, rubs, or gallops  ABDOMEN: Soft, Nontender, Nondistended; Bowel sounds present  EXTREMITIES:  2+ Peripheral Pulses, No clubbing, cyanosis, or edema  PSYCH: AAOx3  NEUROLOGY: non-focal  SKIN: No rashes or lesions    LABS:                        13.6   7.63  )-----------( 274      ( 04 Feb 2021 18:35 )             41.8     02-04    140  |  104  |  10  ----------------------------<  104<H>  4.1   |  22  |  0.49<L>    Ca    9.8      04 Feb 2021 18:35    TPro  7.5  /  Alb  4.5  /  TBili  0.4  /  DBili  x   /  AST  15  /  ALT  23  /  AlkPhos  79  02-04      RADIOLOGY & ADDITIONAL TESTS:    Assessment and Plan: 47 y/o Mandarin Speaking Female with PMHx Schizophrenia , Schizoaffective disorder present for agitation, non-compliance with medication from Group Home. Pt denies chest pain, SOB, STOVER, PND, orthopnea, palpitations, diaphoresis, lightheadedness, dizziness, syncope, increased lowr extremity edema, fever chills, malaise, myalgias, anorexia, weight changes ( loss or gain), nightsweats, generalized fatigue abdominal pain, N/V/C/D BRBPR, melena, urinary symptoms, cough, and wheezing.      #[id number]    # Agitation  - c/w home meds   - continue care per psych team     #Schizophrenia   - continue care per psych team   - c/w current psych meds

## 2021-02-05 NOTE — PSYCHIATRIC REHAB INITIAL EVALUATION - NSBHPRRECOMMEND_PSY_ALL_CORE
Psych rehab attempted to meet with patient however pt refused therefore following information was obtained from patient's chart.  Pt is Mandarin speaking and resides in [facility].  Patient is currently noncompliant with medication and has history of noncompliance.  Patient has history of multiple inpatient admissions.  Psych rehab staff will continue to engage patient daily to build therapeutic rapport and assist patient in psych rehab goals pertaining to attending daily psychoeducation groups as well as maintaining medication and treatment compliance for improved symptom management.

## 2021-02-05 NOTE — BH SOCIAL WORK INITIAL PSYCHOSOCIAL EVALUATION - NSBHABUSECOMMENTFT_PSY_ALL_CORE
pt presented disorganized at time of assessment, however as per EMR pt domiciled in [facility] and ACT team visited pt day of admission, no information / concerns of above noted, only that pt has not been caring for herself.

## 2021-02-05 NOTE — BH INPATIENT PSYCHIATRY ASSESSMENT NOTE - CURRENT MEDICATION
MEDICATIONS  (STANDING):  benztropine 0.5 milliGRAM(s) Oral two times a day  clonazePAM  Tablet 1 milliGRAM(s) Oral at bedtime  diVALproex  milliGRAM(s) Oral at bedtime  OLANZapine Disintegrating Tablet 10 milliGRAM(s) Oral at bedtime  paliperidone ER 3 milliGRAM(s) Oral at bedtime    MEDICATIONS  (PRN):  diphenhydrAMINE 50 milliGRAM(s) Oral every 8 hours PRN Rash and/or Itching, agitation, EPS  diphenhydrAMINE   Injectable 50 milliGRAM(s) IntraMuscular once PRN agitation  haloperidol     Tablet 5 milliGRAM(s) Oral every 8 hours PRN agitation  haloperidol    Injectable 5 milliGRAM(s) IntraMuscular once PRN agitation  LORazepam     Tablet 2 milliGRAM(s) Oral every 8 hours PRN agitation  LORazepam   Injectable 2 milliGRAM(s) IntraMuscular once PRN agitation

## 2021-02-05 NOTE — BH INPATIENT PSYCHIATRY ASSESSMENT NOTE - OTHER PAST PSYCHIATRIC HISTORY (INCLUDE DETAILS REGARDING ONSET, COURSE OF ILLNESS, INPATIENT/OUTPATIENT TREATMENT)
AOT order from 6/28/2019 to 6/28/2020  ACT team: [facility] [employer]   Inpatient Hosp: [facility] 1/2016, [place] 2/2016 (66 days), [facility] 4/2016 (433 days), [facility] 1/2020 (27 days); [facility] 12/17/2020-1/14/2021 (28 days).

## 2021-02-05 NOTE — BH SOCIAL WORK INITIAL PSYCHOSOCIAL EVALUATION - OTHER PAST PSYCHIATRIC HISTORY (INCLUDE DETAILS REGARDING ONSET, COURSE OF ILLNESS, INPATIENT/OUTPATIENT TREATMENT)
Pt is a 47yo Chinese female, Mandarin-speaking, domiciled in a [facility] group home,  with PPHx of schizoaffective disorder. Pt has documented h/o of multiple hospitalizations (last at [facility] 12/17/2020-1/14/2021), for psychosis. Pt is noted to have a h/o AOT and  a 440-day hospitalization from 4/2016-6/2017 at [facility]. Pt currently is followed but an ACT team - [phone] - followed by [facility]. Pt was BIB EMS activated by her residence for decompensation in setting of med noncompliance.
Interactive/Verbalization clear and understandable for age/Normal unassisted gait/Affect appropriate/Motor strength normal in all extremities/Sensation intact to touch

## 2021-02-05 NOTE — BH INPATIENT PSYCHIATRY ASSESSMENT NOTE - NSBHCHARTREVIEWVS_PSY_A_CORE FT
Vital Signs Last 24 Hrs  T(C): 37.4 (05 Feb 2021 08:41), Max: 37.4 (05 Feb 2021 08:41)  T(F): 99.3 (05 Feb 2021 08:41), Max: 99.3 (05 Feb 2021 08:41)  HR: 99 (05 Feb 2021 06:47) (98 - 114)  BP: 110/75 (05 Feb 2021 02:22) (105/64 - 125/84)  BP(mean): --  RR: 18 (05 Feb 2021 02:22) (18 - 18)  SpO2: 96% (05 Feb 2021 02:22) (95% - 99%)

## 2021-02-05 NOTE — CHART NOTE - NSCHARTNOTEFT_GEN_A_CORE
EMERGENCY : ROBERTSW received email from telepsychiatry indicating that patient was transferred to inpatient psych at [facility] and that authorization through insurance company may be required. LMSW reviewed patient's chart, as per chart review patient has [employer] Medicaid ID# [id number] which requires authorization for inpatient psychiatric services. PJ contacted the provider line and spoke with Jose Manuel who states that the accepting facility ([facility]), already initiated authorization Reference # [id number]. Email sent to MELINDA to see if any further ED SW follow up is needed. Social work continues to remain available for any further needs.

## 2021-02-05 NOTE — ED ADULT NURSE REASSESSMENT NOTE - NS ED NURSE REASSESS COMMENT FT1
pt. has been accepted @ [facility], [place]  on a 2 PC, report given to ELIZABETH Pickering @ [facility] and [facility] was called for transport.

## 2021-02-06 PROCEDURE — 99232 SBSQ HOSP IP/OBS MODERATE 35: CPT

## 2021-02-06 RX ADMIN — DIVALPROEX SODIUM 500 MILLIGRAM(S): 500 TABLET, DELAYED RELEASE ORAL at 20:44

## 2021-02-06 RX ADMIN — OLANZAPINE 10 MILLIGRAM(S): 15 TABLET, FILM COATED ORAL at 20:44

## 2021-02-06 RX ADMIN — Medication 0.5 MILLIGRAM(S): at 20:44

## 2021-02-06 RX ADMIN — Medication 1 MILLIGRAM(S): at 20:44

## 2021-02-06 RX ADMIN — PALIPERIDONE 3 MILLIGRAM(S): 1.5 TABLET, EXTENDED RELEASE ORAL at 20:44

## 2021-02-07 PROCEDURE — 99232 SBSQ HOSP IP/OBS MODERATE 35: CPT

## 2021-02-07 RX ORDER — DIVALPROEX SODIUM 500 MG/1
750 TABLET, DELAYED RELEASE ORAL AT BEDTIME
Refills: 0 | Status: DISCONTINUED | OUTPATIENT
Start: 2021-02-07 | End: 2021-02-10

## 2021-02-07 RX ORDER — ACETAMINOPHEN 500 MG
650 TABLET ORAL EVERY 6 HOURS
Refills: 0 | Status: DISCONTINUED | OUTPATIENT
Start: 2021-02-07 | End: 2021-03-10

## 2021-02-07 RX ADMIN — Medication 650 MILLIGRAM(S): at 11:10

## 2021-02-07 RX ADMIN — Medication 650 MILLIGRAM(S): at 17:10

## 2021-02-07 RX ADMIN — Medication 0.5 MILLIGRAM(S): at 08:27

## 2021-02-07 NOTE — BH INPATIENT PSYCHIATRY PROGRESS NOTE - NSBHMSESPABN_PSY_A_CORE
Patient arrived ambulatory to the [facility] for Ertapenem. Reviewed vital signs, labs, and physician order. Patient arrives with IV access established in right forearm, visualized brisk blood return. Ertapenem administered, no adverse reaction observed. IV flushed per protocol, secured with coban, gauze, clave covers, and protective mesh sleeve. Confirmed upcoming appointment date and time with patient. Patient left the [facility] ambulatory in no sign of distress.    Loud volume/Other

## 2021-02-08 PROCEDURE — 99232 SBSQ HOSP IP/OBS MODERATE 35: CPT

## 2021-02-09 PROCEDURE — 99232 SBSQ HOSP IP/OBS MODERATE 35: CPT

## 2021-02-10 PROCEDURE — 99232 SBSQ HOSP IP/OBS MODERATE 35: CPT

## 2021-02-10 RX ORDER — DIVALPROEX SODIUM 500 MG/1
250 TABLET, DELAYED RELEASE ORAL
Refills: 0 | Status: DISCONTINUED | OUTPATIENT
Start: 2021-02-10 | End: 2021-02-12

## 2021-02-10 RX ORDER — CLONAZEPAM 1 MG
1 TABLET ORAL
Refills: 0 | Status: DISCONTINUED | OUTPATIENT
Start: 2021-02-10 | End: 2021-02-16

## 2021-02-10 RX ORDER — OLANZAPINE 15 MG/1
5 TABLET, FILM COATED ORAL DAILY
Refills: 0 | Status: DISCONTINUED | OUTPATIENT
Start: 2021-02-10 | End: 2021-02-17

## 2021-02-10 RX ORDER — OLANZAPINE 15 MG/1
5 TABLET, FILM COATED ORAL AT BEDTIME
Refills: 0 | Status: DISCONTINUED | OUTPATIENT
Start: 2021-02-10 | End: 2021-02-17

## 2021-02-10 RX ADMIN — OLANZAPINE 5 MILLIGRAM(S): 15 TABLET, FILM COATED ORAL at 20:26

## 2021-02-10 RX ADMIN — Medication 0.5 MILLIGRAM(S): at 09:44

## 2021-02-10 RX ADMIN — Medication 1 MILLIGRAM(S): at 20:26

## 2021-02-10 RX ADMIN — DIVALPROEX SODIUM 250 MILLIGRAM(S): 500 TABLET, DELAYED RELEASE ORAL at 20:26

## 2021-02-10 RX ADMIN — Medication 0.5 MILLIGRAM(S): at 20:26

## 2021-02-10 RX ADMIN — PALIPERIDONE 3 MILLIGRAM(S): 1.5 TABLET, EXTENDED RELEASE ORAL at 20:26

## 2021-02-11 PROCEDURE — 99232 SBSQ HOSP IP/OBS MODERATE 35: CPT

## 2021-02-11 RX ADMIN — DIVALPROEX SODIUM 250 MILLIGRAM(S): 500 TABLET, DELAYED RELEASE ORAL at 20:46

## 2021-02-11 RX ADMIN — PALIPERIDONE 3 MILLIGRAM(S): 1.5 TABLET, EXTENDED RELEASE ORAL at 20:47

## 2021-02-11 RX ADMIN — Medication 0.5 MILLIGRAM(S): at 08:29

## 2021-02-11 RX ADMIN — DIVALPROEX SODIUM 250 MILLIGRAM(S): 500 TABLET, DELAYED RELEASE ORAL at 08:29

## 2021-02-11 RX ADMIN — Medication 1 MILLIGRAM(S): at 08:29

## 2021-02-11 RX ADMIN — OLANZAPINE 5 MILLIGRAM(S): 15 TABLET, FILM COATED ORAL at 20:47

## 2021-02-11 RX ADMIN — OLANZAPINE 5 MILLIGRAM(S): 15 TABLET, FILM COATED ORAL at 08:29

## 2021-02-11 RX ADMIN — Medication 1 MILLIGRAM(S): at 20:46

## 2021-02-11 RX ADMIN — Medication 0.5 MILLIGRAM(S): at 20:46

## 2021-02-11 NOTE — BH INPATIENT PSYCHIATRY PROGRESS NOTE - OTHER
Mandarin speaking impoverished, less disorganized "good" responding to internal stimuli on the unit less irritable

## 2021-02-12 PROCEDURE — 99232 SBSQ HOSP IP/OBS MODERATE 35: CPT

## 2021-02-12 RX ORDER — DIVALPROEX SODIUM 500 MG/1
500 TABLET, DELAYED RELEASE ORAL AT BEDTIME
Refills: 0 | Status: DISCONTINUED | OUTPATIENT
Start: 2021-02-12 | End: 2021-02-16

## 2021-02-12 RX ORDER — DIVALPROEX SODIUM 500 MG/1
250 TABLET, DELAYED RELEASE ORAL DAILY
Refills: 0 | Status: DISCONTINUED | OUTPATIENT
Start: 2021-02-13 | End: 2021-02-16

## 2021-02-12 RX ADMIN — DIVALPROEX SODIUM 250 MILLIGRAM(S): 500 TABLET, DELAYED RELEASE ORAL at 08:37

## 2021-02-12 RX ADMIN — Medication 0.5 MILLIGRAM(S): at 08:37

## 2021-02-12 RX ADMIN — Medication 1 MILLIGRAM(S): at 20:28

## 2021-02-12 RX ADMIN — DIVALPROEX SODIUM 500 MILLIGRAM(S): 500 TABLET, DELAYED RELEASE ORAL at 20:29

## 2021-02-12 RX ADMIN — Medication 1 MILLIGRAM(S): at 08:38

## 2021-02-12 RX ADMIN — PALIPERIDONE 3 MILLIGRAM(S): 1.5 TABLET, EXTENDED RELEASE ORAL at 20:29

## 2021-02-12 RX ADMIN — OLANZAPINE 5 MILLIGRAM(S): 15 TABLET, FILM COATED ORAL at 08:38

## 2021-02-12 RX ADMIN — Medication 0.5 MILLIGRAM(S): at 20:28

## 2021-02-12 RX ADMIN — OLANZAPINE 5 MILLIGRAM(S): 15 TABLET, FILM COATED ORAL at 20:29

## 2021-02-13 RX ADMIN — Medication 1 MILLIGRAM(S): at 20:33

## 2021-02-13 RX ADMIN — OLANZAPINE 5 MILLIGRAM(S): 15 TABLET, FILM COATED ORAL at 20:33

## 2021-02-13 RX ADMIN — Medication 0.5 MILLIGRAM(S): at 20:32

## 2021-02-13 RX ADMIN — Medication 0.5 MILLIGRAM(S): at 08:37

## 2021-02-13 RX ADMIN — DIVALPROEX SODIUM 250 MILLIGRAM(S): 500 TABLET, DELAYED RELEASE ORAL at 08:37

## 2021-02-13 RX ADMIN — Medication 50 MILLIGRAM(S): at 20:54

## 2021-02-13 RX ADMIN — DIVALPROEX SODIUM 500 MILLIGRAM(S): 500 TABLET, DELAYED RELEASE ORAL at 20:33

## 2021-02-13 RX ADMIN — Medication 1 MILLIGRAM(S): at 08:37

## 2021-02-13 RX ADMIN — PALIPERIDONE 3 MILLIGRAM(S): 1.5 TABLET, EXTENDED RELEASE ORAL at 20:33

## 2021-02-13 RX ADMIN — Medication 2 MILLIGRAM(S): at 13:48

## 2021-02-13 RX ADMIN — OLANZAPINE 5 MILLIGRAM(S): 15 TABLET, FILM COATED ORAL at 08:37

## 2021-02-13 RX ADMIN — HALOPERIDOL DECANOATE 5 MILLIGRAM(S): 100 INJECTION INTRAMUSCULAR at 20:54

## 2021-02-14 RX ADMIN — Medication 1 MILLIGRAM(S): at 08:10

## 2021-02-14 RX ADMIN — OLANZAPINE 5 MILLIGRAM(S): 15 TABLET, FILM COATED ORAL at 08:10

## 2021-02-14 RX ADMIN — Medication 2 MILLIGRAM(S): at 15:22

## 2021-02-14 RX ADMIN — Medication 50 MILLIGRAM(S): at 05:57

## 2021-02-14 RX ADMIN — DIVALPROEX SODIUM 250 MILLIGRAM(S): 500 TABLET, DELAYED RELEASE ORAL at 08:11

## 2021-02-14 RX ADMIN — DIVALPROEX SODIUM 500 MILLIGRAM(S): 500 TABLET, DELAYED RELEASE ORAL at 20:38

## 2021-02-14 RX ADMIN — Medication 1 MILLIGRAM(S): at 20:37

## 2021-02-14 RX ADMIN — Medication 0.5 MILLIGRAM(S): at 20:37

## 2021-02-14 RX ADMIN — HALOPERIDOL DECANOATE 5 MILLIGRAM(S): 100 INJECTION INTRAMUSCULAR at 05:56

## 2021-02-14 RX ADMIN — PALIPERIDONE 3 MILLIGRAM(S): 1.5 TABLET, EXTENDED RELEASE ORAL at 20:38

## 2021-02-14 RX ADMIN — Medication 2 MILLIGRAM(S): at 05:56

## 2021-02-14 RX ADMIN — HALOPERIDOL DECANOATE 5 MILLIGRAM(S): 100 INJECTION INTRAMUSCULAR at 15:20

## 2021-02-14 RX ADMIN — Medication 50 MILLIGRAM(S): at 15:21

## 2021-02-14 RX ADMIN — Medication 0.5 MILLIGRAM(S): at 08:10

## 2021-02-14 RX ADMIN — OLANZAPINE 5 MILLIGRAM(S): 15 TABLET, FILM COATED ORAL at 20:38

## 2021-02-15 RX ORDER — BENZOCAINE AND MENTHOL 5; 1 G/100ML; G/100ML
1 LIQUID ORAL THREE TIMES A DAY
Refills: 0 | Status: DISCONTINUED | OUTPATIENT
Start: 2021-02-15 | End: 2021-03-10

## 2021-02-15 RX ADMIN — Medication 0.5 MILLIGRAM(S): at 08:26

## 2021-02-15 RX ADMIN — DIVALPROEX SODIUM 500 MILLIGRAM(S): 500 TABLET, DELAYED RELEASE ORAL at 20:08

## 2021-02-15 RX ADMIN — Medication 1 MILLIGRAM(S): at 08:26

## 2021-02-15 RX ADMIN — Medication 1 MILLIGRAM(S): at 20:08

## 2021-02-15 RX ADMIN — OLANZAPINE 5 MILLIGRAM(S): 15 TABLET, FILM COATED ORAL at 08:26

## 2021-02-15 RX ADMIN — HALOPERIDOL DECANOATE 5 MILLIGRAM(S): 100 INJECTION INTRAMUSCULAR at 01:46

## 2021-02-15 RX ADMIN — Medication 50 MILLIGRAM(S): at 20:07

## 2021-02-15 RX ADMIN — Medication 50 MILLIGRAM(S): at 01:46

## 2021-02-15 RX ADMIN — OLANZAPINE 5 MILLIGRAM(S): 15 TABLET, FILM COATED ORAL at 20:08

## 2021-02-15 RX ADMIN — BENZOCAINE AND MENTHOL 1 LOZENGE: 5; 1 LIQUID ORAL at 22:46

## 2021-02-15 RX ADMIN — PALIPERIDONE 3 MILLIGRAM(S): 1.5 TABLET, EXTENDED RELEASE ORAL at 20:07

## 2021-02-15 RX ADMIN — DIVALPROEX SODIUM 250 MILLIGRAM(S): 500 TABLET, DELAYED RELEASE ORAL at 08:26

## 2021-02-15 RX ADMIN — Medication 0.5 MILLIGRAM(S): at 20:08

## 2021-02-16 PROCEDURE — 99232 SBSQ HOSP IP/OBS MODERATE 35: CPT

## 2021-02-16 RX ORDER — DIVALPROEX SODIUM 500 MG/1
500 TABLET, DELAYED RELEASE ORAL
Refills: 0 | Status: DISCONTINUED | OUTPATIENT
Start: 2021-02-16 | End: 2021-02-24

## 2021-02-16 RX ORDER — CLONAZEPAM 1 MG
1 TABLET ORAL
Refills: 0 | Status: DISCONTINUED | OUTPATIENT
Start: 2021-02-16 | End: 2021-02-17

## 2021-02-16 RX ADMIN — Medication 1 MILLIGRAM(S): at 20:22

## 2021-02-16 RX ADMIN — OLANZAPINE 5 MILLIGRAM(S): 15 TABLET, FILM COATED ORAL at 08:44

## 2021-02-16 RX ADMIN — Medication 50 MILLIGRAM(S): at 20:22

## 2021-02-16 RX ADMIN — Medication 0.5 MILLIGRAM(S): at 20:23

## 2021-02-16 RX ADMIN — Medication 1 MILLIGRAM(S): at 08:44

## 2021-02-16 RX ADMIN — DIVALPROEX SODIUM 250 MILLIGRAM(S): 500 TABLET, DELAYED RELEASE ORAL at 08:44

## 2021-02-16 RX ADMIN — OLANZAPINE 5 MILLIGRAM(S): 15 TABLET, FILM COATED ORAL at 20:22

## 2021-02-16 RX ADMIN — DIVALPROEX SODIUM 500 MILLIGRAM(S): 500 TABLET, DELAYED RELEASE ORAL at 20:22

## 2021-02-16 RX ADMIN — Medication 0.5 MILLIGRAM(S): at 08:44

## 2021-02-16 RX ADMIN — HALOPERIDOL DECANOATE 5 MILLIGRAM(S): 100 INJECTION INTRAMUSCULAR at 16:26

## 2021-02-16 RX ADMIN — Medication 650 MILLIGRAM(S): at 12:14

## 2021-02-16 RX ADMIN — PALIPERIDONE 3 MILLIGRAM(S): 1.5 TABLET, EXTENDED RELEASE ORAL at 20:22

## 2021-02-16 NOTE — BH INPATIENT PSYCHIATRY PROGRESS NOTE - OTHER
Mandarin speaking impoverished irritable at times responding to internal stimuli on the unit and appears internally preoccupied

## 2021-02-17 PROCEDURE — 99232 SBSQ HOSP IP/OBS MODERATE 35: CPT

## 2021-02-17 RX ORDER — OLANZAPINE 15 MG/1
10 TABLET, FILM COATED ORAL AT BEDTIME
Refills: 0 | Status: COMPLETED | OUTPATIENT
Start: 2021-02-17 | End: 2021-02-17

## 2021-02-17 RX ORDER — CLONAZEPAM 1 MG
1 TABLET ORAL AT BEDTIME
Refills: 0 | Status: DISCONTINUED | OUTPATIENT
Start: 2021-02-17 | End: 2021-02-24

## 2021-02-17 RX ORDER — OLANZAPINE 15 MG/1
15 TABLET, FILM COATED ORAL AT BEDTIME
Refills: 0 | Status: DISCONTINUED | OUTPATIENT
Start: 2021-02-18 | End: 2021-02-22

## 2021-02-17 RX ADMIN — OLANZAPINE 10 MILLIGRAM(S): 15 TABLET, FILM COATED ORAL at 20:28

## 2021-02-17 RX ADMIN — OLANZAPINE 5 MILLIGRAM(S): 15 TABLET, FILM COATED ORAL at 08:33

## 2021-02-17 RX ADMIN — DIVALPROEX SODIUM 500 MILLIGRAM(S): 500 TABLET, DELAYED RELEASE ORAL at 08:33

## 2021-02-17 RX ADMIN — Medication 1 MILLIGRAM(S): at 08:33

## 2021-02-17 RX ADMIN — PALIPERIDONE 3 MILLIGRAM(S): 1.5 TABLET, EXTENDED RELEASE ORAL at 20:29

## 2021-02-17 RX ADMIN — Medication 0.5 MILLIGRAM(S): at 08:34

## 2021-02-17 RX ADMIN — Medication 0.5 MILLIGRAM(S): at 20:28

## 2021-02-17 RX ADMIN — Medication 1 MILLIGRAM(S): at 20:28

## 2021-02-17 RX ADMIN — DIVALPROEX SODIUM 500 MILLIGRAM(S): 500 TABLET, DELAYED RELEASE ORAL at 20:28

## 2021-02-18 PROCEDURE — 99232 SBSQ HOSP IP/OBS MODERATE 35: CPT

## 2021-02-18 RX ADMIN — OLANZAPINE 15 MILLIGRAM(S): 15 TABLET, FILM COATED ORAL at 21:02

## 2021-02-18 RX ADMIN — DIVALPROEX SODIUM 500 MILLIGRAM(S): 500 TABLET, DELAYED RELEASE ORAL at 21:02

## 2021-02-18 RX ADMIN — PALIPERIDONE 3 MILLIGRAM(S): 1.5 TABLET, EXTENDED RELEASE ORAL at 21:02

## 2021-02-18 RX ADMIN — Medication 0.5 MILLIGRAM(S): at 21:01

## 2021-02-18 RX ADMIN — Medication 650 MILLIGRAM(S): at 21:02

## 2021-02-18 RX ADMIN — Medication 1 MILLIGRAM(S): at 21:01

## 2021-02-18 RX ADMIN — DIVALPROEX SODIUM 500 MILLIGRAM(S): 500 TABLET, DELAYED RELEASE ORAL at 08:44

## 2021-02-18 RX ADMIN — Medication 0.5 MILLIGRAM(S): at 08:45

## 2021-02-18 NOTE — BH TREATMENT PLAN - NSPTSTATEDGOAL_PSY_ALL_CORE
unable to provide: pt to resume medication, stabilize and  d/c from hospital. 
unable to provide: pt to resume medication, stabilize and  d/c from hospital.

## 2021-02-19 PROCEDURE — 99232 SBSQ HOSP IP/OBS MODERATE 35: CPT

## 2021-02-19 RX ADMIN — Medication 1 MILLIGRAM(S): at 21:27

## 2021-02-19 RX ADMIN — DIVALPROEX SODIUM 500 MILLIGRAM(S): 500 TABLET, DELAYED RELEASE ORAL at 08:43

## 2021-02-19 RX ADMIN — DIVALPROEX SODIUM 500 MILLIGRAM(S): 500 TABLET, DELAYED RELEASE ORAL at 21:29

## 2021-02-19 RX ADMIN — Medication 0.5 MILLIGRAM(S): at 08:43

## 2021-02-19 RX ADMIN — OLANZAPINE 15 MILLIGRAM(S): 15 TABLET, FILM COATED ORAL at 21:29

## 2021-02-19 RX ADMIN — PALIPERIDONE 3 MILLIGRAM(S): 1.5 TABLET, EXTENDED RELEASE ORAL at 21:29

## 2021-02-19 RX ADMIN — Medication 0.5 MILLIGRAM(S): at 21:41

## 2021-02-20 RX ADMIN — DIVALPROEX SODIUM 500 MILLIGRAM(S): 500 TABLET, DELAYED RELEASE ORAL at 08:33

## 2021-02-20 RX ADMIN — Medication 1 MILLIGRAM(S): at 21:08

## 2021-02-20 RX ADMIN — PALIPERIDONE 3 MILLIGRAM(S): 1.5 TABLET, EXTENDED RELEASE ORAL at 21:08

## 2021-02-20 RX ADMIN — Medication 650 MILLIGRAM(S): at 17:55

## 2021-02-20 RX ADMIN — Medication 0.5 MILLIGRAM(S): at 21:08

## 2021-02-20 RX ADMIN — Medication 0.5 MILLIGRAM(S): at 08:33

## 2021-02-20 RX ADMIN — DIVALPROEX SODIUM 500 MILLIGRAM(S): 500 TABLET, DELAYED RELEASE ORAL at 21:08

## 2021-02-20 RX ADMIN — OLANZAPINE 15 MILLIGRAM(S): 15 TABLET, FILM COATED ORAL at 21:08

## 2021-02-20 RX ADMIN — Medication 50 MILLIGRAM(S): at 21:08

## 2021-02-21 RX ADMIN — OLANZAPINE 15 MILLIGRAM(S): 15 TABLET, FILM COATED ORAL at 20:29

## 2021-02-21 RX ADMIN — PALIPERIDONE 3 MILLIGRAM(S): 1.5 TABLET, EXTENDED RELEASE ORAL at 20:29

## 2021-02-21 RX ADMIN — Medication 1 MILLIGRAM(S): at 20:29

## 2021-02-21 RX ADMIN — Medication 0.5 MILLIGRAM(S): at 20:29

## 2021-02-21 RX ADMIN — Medication 0.5 MILLIGRAM(S): at 08:02

## 2021-02-21 RX ADMIN — DIVALPROEX SODIUM 500 MILLIGRAM(S): 500 TABLET, DELAYED RELEASE ORAL at 08:02

## 2021-02-21 RX ADMIN — DIVALPROEX SODIUM 500 MILLIGRAM(S): 500 TABLET, DELAYED RELEASE ORAL at 20:29

## 2021-02-22 PROCEDURE — 99232 SBSQ HOSP IP/OBS MODERATE 35: CPT

## 2021-02-22 RX ORDER — OLANZAPINE 15 MG/1
20 TABLET, FILM COATED ORAL AT BEDTIME
Refills: 0 | Status: DISCONTINUED | OUTPATIENT
Start: 2021-02-22 | End: 2021-02-26

## 2021-02-22 RX ORDER — CHOLECALCIFEROL (VITAMIN D3) 125 MCG
4000 CAPSULE ORAL
Refills: 0 | Status: DISCONTINUED | OUTPATIENT
Start: 2021-02-22 | End: 2021-03-10

## 2021-02-22 RX ADMIN — OLANZAPINE 20 MILLIGRAM(S): 15 TABLET, FILM COATED ORAL at 20:50

## 2021-02-22 RX ADMIN — Medication 0.5 MILLIGRAM(S): at 20:50

## 2021-02-22 RX ADMIN — Medication 1000 UNIT(S): at 20:50

## 2021-02-22 RX ADMIN — DIVALPROEX SODIUM 500 MILLIGRAM(S): 500 TABLET, DELAYED RELEASE ORAL at 08:19

## 2021-02-22 RX ADMIN — PALIPERIDONE 3 MILLIGRAM(S): 1.5 TABLET, EXTENDED RELEASE ORAL at 20:50

## 2021-02-22 RX ADMIN — Medication 650 MILLIGRAM(S): at 10:48

## 2021-02-22 RX ADMIN — Medication 1 MILLIGRAM(S): at 20:50

## 2021-02-22 RX ADMIN — DIVALPROEX SODIUM 500 MILLIGRAM(S): 500 TABLET, DELAYED RELEASE ORAL at 20:50

## 2021-02-22 RX ADMIN — Medication 0.5 MILLIGRAM(S): at 08:19

## 2021-02-23 PROCEDURE — 99232 SBSQ HOSP IP/OBS MODERATE 35: CPT

## 2021-02-23 RX ADMIN — Medication 1000 UNIT(S): at 20:44

## 2021-02-23 RX ADMIN — Medication 0.5 MILLIGRAM(S): at 20:44

## 2021-02-23 RX ADMIN — DIVALPROEX SODIUM 500 MILLIGRAM(S): 500 TABLET, DELAYED RELEASE ORAL at 20:44

## 2021-02-23 RX ADMIN — PALIPERIDONE 3 MILLIGRAM(S): 1.5 TABLET, EXTENDED RELEASE ORAL at 20:44

## 2021-02-23 RX ADMIN — DIVALPROEX SODIUM 500 MILLIGRAM(S): 500 TABLET, DELAYED RELEASE ORAL at 09:20

## 2021-02-23 RX ADMIN — Medication 4000 UNIT(S): at 10:55

## 2021-02-23 RX ADMIN — Medication 1 MILLIGRAM(S): at 20:44

## 2021-02-23 RX ADMIN — Medication 0.5 MILLIGRAM(S): at 09:20

## 2021-02-23 RX ADMIN — OLANZAPINE 20 MILLIGRAM(S): 15 TABLET, FILM COATED ORAL at 20:44

## 2021-02-23 RX ADMIN — Medication 650 MILLIGRAM(S): at 14:32

## 2021-02-24 PROCEDURE — 99232 SBSQ HOSP IP/OBS MODERATE 35: CPT

## 2021-02-24 RX ORDER — DIVALPROEX SODIUM 500 MG/1
750 TABLET, DELAYED RELEASE ORAL
Refills: 0 | Status: DISCONTINUED | OUTPATIENT
Start: 2021-02-24 | End: 2021-03-10

## 2021-02-24 RX ORDER — CLONAZEPAM 1 MG
1 TABLET ORAL AT BEDTIME
Refills: 0 | Status: DISCONTINUED | OUTPATIENT
Start: 2021-02-24 | End: 2021-03-03

## 2021-02-24 RX ADMIN — Medication 1 MILLIGRAM(S): at 21:04

## 2021-02-24 RX ADMIN — Medication 4000 UNIT(S): at 21:33

## 2021-02-24 RX ADMIN — Medication 0.5 MILLIGRAM(S): at 08:39

## 2021-02-24 RX ADMIN — PALIPERIDONE 3 MILLIGRAM(S): 1.5 TABLET, EXTENDED RELEASE ORAL at 21:04

## 2021-02-24 RX ADMIN — DIVALPROEX SODIUM 750 MILLIGRAM(S): 500 TABLET, DELAYED RELEASE ORAL at 21:04

## 2021-02-24 RX ADMIN — OLANZAPINE 20 MILLIGRAM(S): 15 TABLET, FILM COATED ORAL at 21:04

## 2021-02-24 RX ADMIN — Medication 0.5 MILLIGRAM(S): at 21:04

## 2021-02-24 RX ADMIN — DIVALPROEX SODIUM 500 MILLIGRAM(S): 500 TABLET, DELAYED RELEASE ORAL at 08:39

## 2021-02-24 RX ADMIN — Medication 4000 UNIT(S): at 08:39

## 2021-02-25 PROCEDURE — 99232 SBSQ HOSP IP/OBS MODERATE 35: CPT

## 2021-02-25 RX ADMIN — Medication 0.5 MILLIGRAM(S): at 08:50

## 2021-02-25 RX ADMIN — Medication 4000 UNIT(S): at 21:26

## 2021-02-25 RX ADMIN — OLANZAPINE 20 MILLIGRAM(S): 15 TABLET, FILM COATED ORAL at 21:27

## 2021-02-25 RX ADMIN — DIVALPROEX SODIUM 750 MILLIGRAM(S): 500 TABLET, DELAYED RELEASE ORAL at 08:50

## 2021-02-25 RX ADMIN — PALIPERIDONE 3 MILLIGRAM(S): 1.5 TABLET, EXTENDED RELEASE ORAL at 21:27

## 2021-02-25 RX ADMIN — DIVALPROEX SODIUM 750 MILLIGRAM(S): 500 TABLET, DELAYED RELEASE ORAL at 21:27

## 2021-02-25 RX ADMIN — Medication 1 MILLIGRAM(S): at 21:27

## 2021-02-25 RX ADMIN — Medication 0.5 MILLIGRAM(S): at 21:26

## 2021-02-25 RX ADMIN — Medication 4000 UNIT(S): at 09:12

## 2021-02-25 NOTE — BH TREATMENT PLAN - NSCMSPTSTRENGTHS_PSY_ALL_CORE
Strong support system
Compliance to treatment/Strong support system
Strong support system
Compliance to treatment/Strong support system

## 2021-02-25 NOTE — BH TREATMENT PLAN - NSTXPLANTHERAPYSESSIONSFT_PSY_ALL_CORE
02-05-21  Type of therapy: Cognitive behavior therapy  Type of session: Individual  Level of patient participation: Not engaged  Duration of participation: Less than 15 minutes  Therapy conducted by: Psych rehab  Therapy Summary: Psych rehab staff attempted to meet with patient however patient refused therefore the following information was obtained from patient's ED chart.  Patient was admitted in the context of noncompliance with medication and treatment.  
  02-12-21  Type of therapy: Cognitive behavior therapy,Dialectical behavior therapy,Coping skills,Stress management,Symptom management  Type of session: Individual  Level of patient participation: Participated with encouragement  Duration of participation: Less than 15 minutes  Therapy conducted by: Psych rehab  Therapy Summary: Psych rehab staff met with patient using  service,  number [id number].  Patient was minimally receptive to writer and .  Patient denied auditory and visual hallucinations as well as homicidal and suicidal ideation/intent and plan.  Patient does appear to be responding to internal stimuli during the last seven days. Patient is observed in hospital gowns during the past seven days and appears to not have showered.  Patient reported she showered 2 days ago.      Patient is not observed socializing with peers during the past seven days.  
  02-19-21  Type of therapy: Cognitive behavior therapy,Coping skills,Daily living skills,Stress management  Type of session: Individual  Level of patient participation: Resistance to participation  Duration of participation: Less than 15 minutes  Therapy conducted by: Psych rehab  Therapy Summary: Writer approached patient with invitation to utilize  services to discuss progress towards psych rehab goals.  Patient gestured by shaking head back and forth indicating that she would not like to meet with writer at this time.  Given patients declination to meet with writer, writer was unable to obtain patients self-report regarding progress.  This writer will follow-up with unit staff to ensure follow-up with patient for ongoing support.    Per attending treatment team, patient is not compliant with medications and remains fairly isolative.  Per writer's observations, patient's ADL's were fair, and patient appeared slightly dissheveled.  Per treatment team there are no SI/I/P or HI/I/P concerns.

## 2021-02-25 NOTE — BH TREATMENT PLAN - NSTXDISORGGOAL_PSY_ALL_CORE
Will demonstrate related thoughts for 5 min in conversation
Will demonstrate purposeful and predictable thoughts/behaviors by making a request
Will demonstrate the ability to maintain reality orientation and communicate clearly with others during 2 conversations with staff daily
Will demonstrate purposeful and predictable thoughts/behaviors by making a request

## 2021-02-25 NOTE — BH TREATMENT PLAN - NSTXPSYCHOGOAL_PSY_ALL_CORE
Will ask for PRN medication to manage hallucinations
Will be able to report experiencing hallucinations to staff
Will ask for PRN medication to manage hallucinations
Will be able to report experiencing hallucinations to staff

## 2021-02-25 NOTE — BH TREATMENT PLAN - NSTXPSYCHOINTERRN_PSY_ALL_CORE
Monitor for signs of psychosis and maintain patient safety.
Observe for signs of psychosis and maintain patient safety.
Monitor behavior, provided PRN medication as needed, and maintain patient safety.

## 2021-02-25 NOTE — BH TREATMENT PLAN - NSTXDISORGINTERRN_PSY_ALL_CORE
Reinforce medication compliance.
Encourage use of  phone to assess though process. Encourage medication compliance. Maintain patient safety.
Provide redirection as needed and maintain patient safety.

## 2021-02-25 NOTE — BH TREATMENT PLAN - NSTXDCOPNOINTERSW_PSY_ALL_CORE
pt has ACT team and will return to team post d/c.
Sw met with pt who presented disorganied and unable to provide PPHx / poor historian due to non-compliance.
Sw has met with pt througout the week to assess p/s needs and progress toward d/c. Pt is able to return back to her residence and has an ACT team.

## 2021-02-25 NOTE — BH TREATMENT PLAN - NSTXMEDICGOAL_PSY_ALL_CORE
Be able to describe the benefit of medication/treatment
Take all medications as prescribed

## 2021-02-25 NOTE — BH TREATMENT PLAN - NSTXPATIENTPARTICIPATE_PSY_ALL_CORE
No, patient unwilling to participate

## 2021-02-25 NOTE — BH TREATMENT PLAN - NSTXMEDICINTERRN_PSY_ALL_CORE
Encourage medication compliance.
Encourage medication compliance.
Encourage continued medication compliance.

## 2021-02-25 NOTE — BH TREATMENT PLAN - NSTXPROBDISORG_PSY_ALL_CORE
DISORGANIZATION OF THOUGHT/BEHAVIOR

## 2021-02-26 PROCEDURE — 99232 SBSQ HOSP IP/OBS MODERATE 35: CPT

## 2021-02-26 RX ORDER — OLANZAPINE 15 MG/1
25 TABLET, FILM COATED ORAL AT BEDTIME
Refills: 0 | Status: ACTIVE | OUTPATIENT
Start: 2021-02-26 | End: 2022-01-25

## 2021-02-26 RX ADMIN — Medication 4000 UNIT(S): at 08:37

## 2021-02-26 RX ADMIN — Medication 4000 UNIT(S): at 22:00

## 2021-02-26 RX ADMIN — PALIPERIDONE 3 MILLIGRAM(S): 1.5 TABLET, EXTENDED RELEASE ORAL at 21:14

## 2021-02-26 RX ADMIN — DIVALPROEX SODIUM 750 MILLIGRAM(S): 500 TABLET, DELAYED RELEASE ORAL at 21:14

## 2021-02-26 RX ADMIN — Medication 0.5 MILLIGRAM(S): at 21:15

## 2021-02-26 RX ADMIN — Medication 1 MILLIGRAM(S): at 21:15

## 2021-02-26 RX ADMIN — OLANZAPINE 25 MILLIGRAM(S): 15 TABLET, FILM COATED ORAL at 21:15

## 2021-02-26 RX ADMIN — DIVALPROEX SODIUM 750 MILLIGRAM(S): 500 TABLET, DELAYED RELEASE ORAL at 08:37

## 2021-02-26 RX ADMIN — Medication 0.5 MILLIGRAM(S): at 08:37

## 2021-02-27 RX ADMIN — Medication 4000 UNIT(S): at 09:11

## 2021-02-27 RX ADMIN — Medication 0.5 MILLIGRAM(S): at 20:18

## 2021-02-27 RX ADMIN — DIVALPROEX SODIUM 750 MILLIGRAM(S): 500 TABLET, DELAYED RELEASE ORAL at 20:18

## 2021-02-27 RX ADMIN — PALIPERIDONE 3 MILLIGRAM(S): 1.5 TABLET, EXTENDED RELEASE ORAL at 20:18

## 2021-02-27 RX ADMIN — Medication 0.5 MILLIGRAM(S): at 09:11

## 2021-02-27 RX ADMIN — OLANZAPINE 25 MILLIGRAM(S): 15 TABLET, FILM COATED ORAL at 20:19

## 2021-02-27 RX ADMIN — Medication 4000 UNIT(S): at 20:18

## 2021-02-27 RX ADMIN — DIVALPROEX SODIUM 750 MILLIGRAM(S): 500 TABLET, DELAYED RELEASE ORAL at 09:11

## 2021-02-27 RX ADMIN — Medication 1 MILLIGRAM(S): at 20:18

## 2021-02-28 RX ADMIN — DIVALPROEX SODIUM 750 MILLIGRAM(S): 500 TABLET, DELAYED RELEASE ORAL at 08:18

## 2021-02-28 RX ADMIN — Medication 4000 UNIT(S): at 20:32

## 2021-02-28 RX ADMIN — Medication 0.5 MILLIGRAM(S): at 08:18

## 2021-02-28 RX ADMIN — Medication 0.5 MILLIGRAM(S): at 20:33

## 2021-02-28 RX ADMIN — DIVALPROEX SODIUM 750 MILLIGRAM(S): 500 TABLET, DELAYED RELEASE ORAL at 20:33

## 2021-02-28 RX ADMIN — PALIPERIDONE 3 MILLIGRAM(S): 1.5 TABLET, EXTENDED RELEASE ORAL at 20:32

## 2021-02-28 RX ADMIN — Medication 4000 UNIT(S): at 08:19

## 2021-02-28 RX ADMIN — OLANZAPINE 25 MILLIGRAM(S): 15 TABLET, FILM COATED ORAL at 20:32

## 2021-02-28 RX ADMIN — Medication 1 MILLIGRAM(S): at 20:33

## 2021-03-01 PROCEDURE — 99232 SBSQ HOSP IP/OBS MODERATE 35: CPT

## 2021-03-01 RX ORDER — CLONAZEPAM 1 MG
0.5 TABLET ORAL DAILY
Refills: 0 | Status: DISCONTINUED | OUTPATIENT
Start: 2021-03-02 | End: 2021-03-03

## 2021-03-01 RX ADMIN — PALIPERIDONE 3 MILLIGRAM(S): 1.5 TABLET, EXTENDED RELEASE ORAL at 21:35

## 2021-03-01 RX ADMIN — Medication 0.5 MILLIGRAM(S): at 21:33

## 2021-03-01 RX ADMIN — Medication 1 MILLIGRAM(S): at 21:34

## 2021-03-01 RX ADMIN — Medication 0.5 MILLIGRAM(S): at 08:20

## 2021-03-01 RX ADMIN — Medication 4000 UNIT(S): at 21:34

## 2021-03-01 RX ADMIN — OLANZAPINE 25 MILLIGRAM(S): 15 TABLET, FILM COATED ORAL at 21:35

## 2021-03-01 RX ADMIN — DIVALPROEX SODIUM 750 MILLIGRAM(S): 500 TABLET, DELAYED RELEASE ORAL at 21:34

## 2021-03-01 RX ADMIN — DIVALPROEX SODIUM 750 MILLIGRAM(S): 500 TABLET, DELAYED RELEASE ORAL at 08:20

## 2021-03-01 RX ADMIN — Medication 4000 UNIT(S): at 08:20

## 2021-03-02 PROCEDURE — 99232 SBSQ HOSP IP/OBS MODERATE 35: CPT

## 2021-03-02 RX ADMIN — DIVALPROEX SODIUM 750 MILLIGRAM(S): 500 TABLET, DELAYED RELEASE ORAL at 20:52

## 2021-03-02 RX ADMIN — PALIPERIDONE 3 MILLIGRAM(S): 1.5 TABLET, EXTENDED RELEASE ORAL at 20:53

## 2021-03-02 RX ADMIN — Medication 0.5 MILLIGRAM(S): at 08:13

## 2021-03-02 RX ADMIN — DIVALPROEX SODIUM 750 MILLIGRAM(S): 500 TABLET, DELAYED RELEASE ORAL at 08:13

## 2021-03-02 RX ADMIN — Medication 4000 UNIT(S): at 20:51

## 2021-03-02 RX ADMIN — OLANZAPINE 25 MILLIGRAM(S): 15 TABLET, FILM COATED ORAL at 20:52

## 2021-03-02 RX ADMIN — Medication 0.5 MILLIGRAM(S): at 20:50

## 2021-03-02 RX ADMIN — Medication 1 MILLIGRAM(S): at 20:52

## 2021-03-03 PROCEDURE — 99232 SBSQ HOSP IP/OBS MODERATE 35: CPT

## 2021-03-03 RX ORDER — CLONAZEPAM 1 MG
1 TABLET ORAL AT BEDTIME
Refills: 0 | Status: DISCONTINUED | OUTPATIENT
Start: 2021-03-03 | End: 2021-03-10

## 2021-03-03 RX ORDER — CLONAZEPAM 1 MG
0.5 TABLET ORAL DAILY
Refills: 0 | Status: DISCONTINUED | OUTPATIENT
Start: 2021-03-03 | End: 2021-03-10

## 2021-03-03 RX ADMIN — Medication 0.5 MILLIGRAM(S): at 21:03

## 2021-03-03 RX ADMIN — Medication 0.5 MILLIGRAM(S): at 08:13

## 2021-03-03 RX ADMIN — DIVALPROEX SODIUM 750 MILLIGRAM(S): 500 TABLET, DELAYED RELEASE ORAL at 08:13

## 2021-03-03 RX ADMIN — DIVALPROEX SODIUM 750 MILLIGRAM(S): 500 TABLET, DELAYED RELEASE ORAL at 21:03

## 2021-03-03 RX ADMIN — Medication 4000 UNIT(S): at 21:41

## 2021-03-03 RX ADMIN — OLANZAPINE 25 MILLIGRAM(S): 15 TABLET, FILM COATED ORAL at 21:03

## 2021-03-03 RX ADMIN — PALIPERIDONE 3 MILLIGRAM(S): 1.5 TABLET, EXTENDED RELEASE ORAL at 21:03

## 2021-03-03 RX ADMIN — Medication 4000 UNIT(S): at 08:14

## 2021-03-03 RX ADMIN — Medication 2 MILLIGRAM(S): at 21:41

## 2021-03-03 RX ADMIN — Medication 1 MILLIGRAM(S): at 21:03

## 2021-03-04 PROCEDURE — 99232 SBSQ HOSP IP/OBS MODERATE 35: CPT

## 2021-03-04 RX ADMIN — Medication 0.5 MILLIGRAM(S): at 08:17

## 2021-03-04 RX ADMIN — PALIPERIDONE 3 MILLIGRAM(S): 1.5 TABLET, EXTENDED RELEASE ORAL at 20:35

## 2021-03-04 RX ADMIN — Medication 4000 UNIT(S): at 20:34

## 2021-03-04 RX ADMIN — Medication 1 MILLIGRAM(S): at 20:35

## 2021-03-04 RX ADMIN — Medication 0.5 MILLIGRAM(S): at 20:36

## 2021-03-04 RX ADMIN — DIVALPROEX SODIUM 750 MILLIGRAM(S): 500 TABLET, DELAYED RELEASE ORAL at 20:35

## 2021-03-04 RX ADMIN — DIVALPROEX SODIUM 750 MILLIGRAM(S): 500 TABLET, DELAYED RELEASE ORAL at 08:17

## 2021-03-05 PROCEDURE — 99232 SBSQ HOSP IP/OBS MODERATE 35: CPT

## 2021-03-05 RX ADMIN — Medication 1 MILLIGRAM(S): at 20:40

## 2021-03-05 RX ADMIN — Medication 0.5 MILLIGRAM(S): at 09:23

## 2021-03-05 RX ADMIN — Medication 4000 UNIT(S): at 20:48

## 2021-03-05 RX ADMIN — Medication 650 MILLIGRAM(S): at 09:43

## 2021-03-05 RX ADMIN — DIVALPROEX SODIUM 750 MILLIGRAM(S): 500 TABLET, DELAYED RELEASE ORAL at 09:15

## 2021-03-05 RX ADMIN — Medication 650 MILLIGRAM(S): at 09:16

## 2021-03-05 RX ADMIN — DIVALPROEX SODIUM 750 MILLIGRAM(S): 500 TABLET, DELAYED RELEASE ORAL at 20:40

## 2021-03-05 RX ADMIN — Medication 4000 UNIT(S): at 09:23

## 2021-03-05 RX ADMIN — Medication 0.5 MILLIGRAM(S): at 20:40

## 2021-03-05 RX ADMIN — PALIPERIDONE 3 MILLIGRAM(S): 1.5 TABLET, EXTENDED RELEASE ORAL at 20:40

## 2021-03-05 NOTE — BH INPATIENT PSYCHIATRY PROGRESS NOTE - NSTXDCOPNOGOALOTHER_PSY_ALL_CORE
Pt will partake in d/c planning to discuss barried for complying with  ACT team; pt is noted to have AOT, order has been requested.

## 2021-03-05 NOTE — BH INPATIENT PSYCHIATRY PROGRESS NOTE - NSBHMSEKNOWHOW_PSY_ALL_CORE
Current Events

## 2021-03-05 NOTE — BH INPATIENT PSYCHIATRY PROGRESS NOTE - NSBHMSERELATED_PSY_A_CORE
,DirectAddress_Unknown Poor ,DirectAddress_Unknown,[email or web address] Fair

## 2021-03-06 RX ADMIN — Medication 0.5 MILLIGRAM(S): at 08:46

## 2021-03-06 RX ADMIN — Medication 1 MILLIGRAM(S): at 20:19

## 2021-03-06 RX ADMIN — Medication 1000 UNIT(S): at 20:19

## 2021-03-06 RX ADMIN — PALIPERIDONE 3 MILLIGRAM(S): 1.5 TABLET, EXTENDED RELEASE ORAL at 20:18

## 2021-03-06 RX ADMIN — Medication 4000 UNIT(S): at 09:24

## 2021-03-06 RX ADMIN — DIVALPROEX SODIUM 750 MILLIGRAM(S): 500 TABLET, DELAYED RELEASE ORAL at 08:46

## 2021-03-06 RX ADMIN — Medication 0.5 MILLIGRAM(S): at 20:18

## 2021-03-06 RX ADMIN — DIVALPROEX SODIUM 500 MILLIGRAM(S): 500 TABLET, DELAYED RELEASE ORAL at 20:19

## 2021-03-07 RX ADMIN — Medication 0.5 MILLIGRAM(S): at 21:01

## 2021-03-07 RX ADMIN — Medication 650 MILLIGRAM(S): at 19:00

## 2021-03-07 RX ADMIN — Medication 0.5 MILLIGRAM(S): at 08:13

## 2021-03-07 RX ADMIN — PALIPERIDONE 3 MILLIGRAM(S): 1.5 TABLET, EXTENDED RELEASE ORAL at 21:00

## 2021-03-07 RX ADMIN — Medication 1 MILLIGRAM(S): at 21:00

## 2021-03-07 RX ADMIN — Medication 4000 UNIT(S): at 08:13

## 2021-03-07 RX ADMIN — Medication 650 MILLIGRAM(S): at 18:19

## 2021-03-07 RX ADMIN — DIVALPROEX SODIUM 750 MILLIGRAM(S): 500 TABLET, DELAYED RELEASE ORAL at 21:01

## 2021-03-07 RX ADMIN — DIVALPROEX SODIUM 750 MILLIGRAM(S): 500 TABLET, DELAYED RELEASE ORAL at 08:13

## 2021-03-07 RX ADMIN — Medication 4000 UNIT(S): at 21:28

## 2021-03-07 RX ADMIN — Medication 0.5 MILLIGRAM(S): at 08:14

## 2021-03-08 PROCEDURE — 99232 SBSQ HOSP IP/OBS MODERATE 35: CPT

## 2021-03-08 RX ORDER — PALIPERIDONE 1.5 MG/1
6 TABLET, EXTENDED RELEASE ORAL AT BEDTIME
Refills: 0 | Status: DISCONTINUED | OUTPATIENT
Start: 2021-03-08 | End: 2021-03-09

## 2021-03-08 RX ADMIN — Medication 4000 UNIT(S): at 11:15

## 2021-03-08 RX ADMIN — Medication 2 MILLIGRAM(S): at 22:08

## 2021-03-08 RX ADMIN — PALIPERIDONE 6 MILLIGRAM(S): 1.5 TABLET, EXTENDED RELEASE ORAL at 20:34

## 2021-03-08 RX ADMIN — HALOPERIDOL DECANOATE 5 MILLIGRAM(S): 100 INJECTION INTRAMUSCULAR at 22:08

## 2021-03-08 RX ADMIN — Medication 0.5 MILLIGRAM(S): at 20:33

## 2021-03-08 RX ADMIN — Medication 1 MILLIGRAM(S): at 20:33

## 2021-03-08 RX ADMIN — Medication 0.5 MILLIGRAM(S): at 08:24

## 2021-03-08 RX ADMIN — DIVALPROEX SODIUM 750 MILLIGRAM(S): 500 TABLET, DELAYED RELEASE ORAL at 20:33

## 2021-03-08 RX ADMIN — Medication 4000 UNIT(S): at 20:33

## 2021-03-08 RX ADMIN — DIVALPROEX SODIUM 750 MILLIGRAM(S): 500 TABLET, DELAYED RELEASE ORAL at 08:24

## 2021-03-09 PROCEDURE — 99232 SBSQ HOSP IP/OBS MODERATE 35: CPT

## 2021-03-09 RX ORDER — DIVALPROEX SODIUM 500 MG/1
1 TABLET, DELAYED RELEASE ORAL
Qty: 60 | Refills: 0
Start: 2021-03-09

## 2021-03-09 RX ORDER — CLONAZEPAM 1 MG
1 TABLET ORAL
Qty: 30 | Refills: 0
Start: 2021-03-09

## 2021-03-09 RX ORDER — BENZTROPINE MESYLATE 1 MG
1 TABLET ORAL
Qty: 60 | Refills: 0
Start: 2021-03-09

## 2021-03-09 RX ORDER — OLANZAPINE 15 MG/1
1 TABLET, FILM COATED ORAL
Qty: 30 | Refills: 0
Start: 2021-03-09

## 2021-03-09 RX ORDER — CHOLECALCIFEROL (VITAMIN D3) 125 MCG
4000 CAPSULE ORAL
Qty: 0 | Refills: 0 | DISCHARGE
Start: 2021-03-09

## 2021-03-09 RX ORDER — PALIPERIDONE 1.5 MG/1
1 TABLET, EXTENDED RELEASE ORAL
Qty: 30 | Refills: 0
Start: 2021-03-09

## 2021-03-09 RX ORDER — PALIPERIDONE 1.5 MG/1
3 TABLET, EXTENDED RELEASE ORAL AT BEDTIME
Refills: 0 | Status: DISCONTINUED | OUTPATIENT
Start: 2021-03-09 | End: 2021-03-10

## 2021-03-09 RX ADMIN — Medication 0.5 MILLIGRAM(S): at 08:39

## 2021-03-09 RX ADMIN — PALIPERIDONE 3 MILLIGRAM(S): 1.5 TABLET, EXTENDED RELEASE ORAL at 20:23

## 2021-03-09 RX ADMIN — DIVALPROEX SODIUM 750 MILLIGRAM(S): 500 TABLET, DELAYED RELEASE ORAL at 20:24

## 2021-03-09 RX ADMIN — Medication 0.5 MILLIGRAM(S): at 20:24

## 2021-03-09 RX ADMIN — Medication 4000 UNIT(S): at 20:23

## 2021-03-09 RX ADMIN — Medication 4000 UNIT(S): at 08:38

## 2021-03-09 RX ADMIN — DIVALPROEX SODIUM 750 MILLIGRAM(S): 500 TABLET, DELAYED RELEASE ORAL at 08:39

## 2021-03-09 RX ADMIN — Medication 1 MILLIGRAM(S): at 20:22

## 2021-03-09 NOTE — BH INPATIENT PSYCHIATRY PROGRESS NOTE - OTHER
concrete, some disorganization but at patient baseline childlike AH this morning, not command or distressing, denies at time of interview Mandarin speaking

## 2021-03-09 NOTE — BH INPATIENT PSYCHIATRY DISCHARGE NOTE - NSBHMETABOLIC_PSY_ALL_CORE_FT
BMI: BMI (kg/m2): 35.9 (02-04-21 @ 17:20)  HbA1c:   Glucose:   BP: 119/81 (03-09-21 @ 06:47) (119/81 - 120/74)  Lipid Panel:

## 2021-03-09 NOTE — BH DISCHARGE NOTE NURSING/SOCIAL WORK/PSYCH REHAB - NSDCPRGOAL_PSY_ALL_CORE
Psychiatric rehabilitation staff has provided purposeful/psychoeducation rounding and individual supportive counseling sessions via use of [employer] to assist patient in demonstrating daily treatment compliance and identifying related effects/benefits for improved symptom management and insight. Patient demonstrated medication compliance without noted side effects but as patient repeatedly refused to sit with writer for  phone use, writer was unable to assess her ability to identify related effects/benefits. In addition, Psychiatric rehabilitation staff had two Mandarin-speaking therapists interview patient and at both times she was unable to identify effects/benefits of medication compliance.  Patient partially attended only music, dance/movement, and other creative arts group therapy sessions with adequate engagement and participation with encouragement.  Psychiatric rehabilitation therapist, Mignon Cordova (therapist is a native speaker of Mandarin) met with patient for final session and noted significant disorganization, paranoid delusions, and patient’s inability tolerate the assessment. Patient did deny ideations to harm herself or anyone else but she was unable to speak to improvement or plans to cope with symptoms/stressors, going forward. Patient was also unable to provide a self-rated CGI score for improvement for the aforementioned reasons. Patient has been mostly non-verbal but attention to self-care/ADL on the unit was fair with staff encouragement. Patient’s with sleep patterns and appetite are reportedly fair at this time.

## 2021-03-09 NOTE — BH DISCHARGE NOTE NURSING/SOCIAL WORK/PSYCH REHAB - NSCDUDCCRISIS_PSY_A_CORE
[facility]  [phone] [facility] ([phone])  Text "WELL" to [phone]  Website: www.[employer]/.[employer] [facility] 1 [phone] 241-BEGB (8489) Website: www.safehorizon.org/.National Suicide Prevention Lifeline 0 (197) 975-9310/.  [employer]  1 (181) LIFENET (572-2007)/.  [facility]  [address] [place] [phone]   Hours:  Monday through Friday from 9 AM to 3 PM/.  [place] [employer] - [facility] Crisis Line  [phone], Option 1

## 2021-03-09 NOTE — BH INPATIENT PSYCHIATRY DISCHARGE NOTE - HPI (INCLUDE ILLNESS QUALITY, SEVERITY, DURATION, TIMING, CONTEXT, MODIFYING FACTORS, ASSOCIATED SIGNS AND SYMPTOMS)
HPI: 46 F, Mandarin-speaking, domiciled at a group home [facility], PMHx of allergic rhinitis, GERD, PPHx of schizoaffective disorder, multiple hospitalizations including a 440-day hospitalization from 4/2016-6/2017, most recently hospitalized in 1/2021 for psychosis, followed by ACT; brought to the hospital by EMS activated by her residence for decompensation in setting of med non adherence. Per ACT supervisor, she was unresponsive, staring, very rigid, and different from baseline function/presentation.    Patient interviewed with Mandarin  ([employer]) [id number]. Patient was very mildly irritable but was able to participate fully with interview (apparently, in the ED, this was not the case). Patient states that she is in the hospital because there is something wrong with her intestine or gut. She states that her mood recently has been great and that she has had no problems with sleep, energy, or appetite. She denies any recent suicidal ideation, intent, or plan. She denies any homicidal, violent, or aggressive ideation, intent, or plan. She denies lifetime manic symptoms. She denies psychotic symptoms including auditory hallucinations, visual hallucinations, paranoid ideation, ideas of reference. Patient does not appear paranoid during interview; apparently, she exhibited paranoid behavior in ED.    PPHx: diagnosis of schizoaffective disorder, multiple hospitalizations including 440 day hospitalization, most recently hospitalized 1/21 for psychosis, followed by ACT (supervisor Rosalva ), on clonazepam 1 mg QHS, paliperidone 6 mg QHS, benztropine 0.5 mg BID, divalproex 1000 mg QHS, olanzapine 15 mg QHS, Trinza [id number] next due 3/15/21; per patient, she has not been taking any medications.     PMHx: allergic rhinitis, GERD, does not take any medications.    Allergies:  none    Family history: no history of psychiatric illness in family per patient    Substance use history: no substance use, including alcohol, tobacco, marijuana, cocaine, heroin    Social history: patient lives in group home, is unemployed, states that father took her to the hospital but she's not sure where he lives or if he lives in the area, states that she does not think she has any family in the area    Vitals: tachy to 104 in ED, otherwise stable    Labs: unconcerning, except for VPA level 10, patient likely has not been taking    Studies: EKG showed QTc unconcerning    MSE:   calm, cooperative, well developed, appears age, fair to poor hygiene and grooming, poor to fair eye contact, fair relatedness, normal impulse control during interview, no abnormal movements seen, speech was normal volume, normal rate, decreased productivity, reported mood was good, affect was blunted to neutral, affect was not congruent with stated mood, thought process was linear and generally logical, not disorganized, some poverty of thought, thought content was unremarkable, no apparent perceptual disturbance, normal concentration, poor insight, poor judgment    Assessment:  46 F, Mandarin-speaking, domiciled at a group [facility], PMHx of allergic rhinitis, GERD, PPHx of schizoaffective disorder, multiple hospitalizations including a 440-day hospitalization from 4/2016-6/2017, most recently hospitalized in 1/2021 for psychosis, followed by ACT; brought to the hospital by EMS activated by her residence for decompensation in setting of med non adherence. Per ACT supervisor, she was unresponsive, staring, very rigid, and different from baseline function/presentation.    Likely exacerbation of underlying psychotic disorder in the context of medication non adherence, although patient's presentation on interview appeared to be significantly better than observed by ACT team or in ED.     Risk Assessment: Low acute risk of harm to self or others. Risk factors include existing psychotic and mood disorder, poor insight and judgment, medication non adherence, multiple prior hospitalizations. Protective factors include denies SHVIIP at this time, no history of violence, no history of SAs, stably domiciled.    Pt seen utilizing Mandarin [employer]  ID #:  [id number].  On interview, patient irritable at times, disorganized,  cannot make out what she is saying at times due to disorganization.  Pt disheveled with poor self care.  Pt reports not sleeping at night.  Pt denies SI/HI/I/P or AH/VH or paranoia.  Pt reports eating well. Pt reports she was admitted because her stomach is growing.

## 2021-03-09 NOTE — BH INPATIENT PSYCHIATRY DISCHARGE NOTE - NSBHDCRISKMITIGATE_PSY_ALL_CORE
Safety planning/Referral to ACT Team/Family/Other social support involvement/Long acting injectable medication/Assisted outpatient treatment

## 2021-03-09 NOTE — BH DISCHARGE NOTE NURSING/SOCIAL WORK/PSYCH REHAB - NSDPACMPNYOTHER_GEN_ALL_CORE
Pt  returning to her Cody Sesay's residence via [place] tang with [employer] Conf#  [id number]. Pt's ACT team worker will follow clyde.

## 2021-03-09 NOTE — BH INPATIENT PSYCHIATRY DISCHARGE NOTE - OTHER PAST PSYCHIATRIC HISTORY (INCLUDE DETAILS REGARDING ONSET, COURSE OF ILLNESS, INPATIENT/OUTPATIENT TREATMENT)
Pt is a 47yo Chinese female, Mandarin-speaking, domiciled in a [facility] group home,  with PPHx of schizoaffective disorder. Pt has documented h/o of multiple hospitalizations (last at [facility] from [facility] 12/17/2020-1/14/2021), for psychosis. Pt is noted to have a h/o AOT and  a 440-day hospitalization from 4/2016-6/2017 at [facility]. Pt currently is followed but an ACT team - [phone] - followed by AC. Pt was BIB EMS activated by her residence for decompensation in setting of med noncompliance.

## 2021-03-09 NOTE — BH INPATIENT PSYCHIATRY DISCHARGE NOTE - NSDCCPCAREPLAN_GEN_ALL_CORE_FT
PRINCIPAL DISCHARGE DIAGNOSIS  Diagnosis: Schizoaffective disorder  Assessment and Plan of Treatment:

## 2021-03-09 NOTE — BH DISCHARGE NOTE NURSING/SOCIAL WORK/PSYCH REHAB - NSDCPRRECOMMEND_PSY_ALL_CORE
Writer recommends that patient demonstrate treatment compliance for symptom management/to avoid readmission. Upon discharge, patient would benefit from residential psychiatric treatment for ongoing medication management, symptom management, safety, and support.

## 2021-03-09 NOTE — BH INPATIENT PSYCHIATRY DISCHARGE NOTE - HOSPITAL COURSE
Pt seen utilizing [employer]  ID #:  [id number].  On interview, patient irritable at times, disorganized,  cannot make out what she is saying at times due to disorganization.  Pt disheveled with poor self care.  Pt reports not sleeping at night.  Pt denies SI/HI/I/P or AH/VH or paranoia.  Pt reports eating well. Pt reports she was admitted because her stomach is growing. On admission, patient irritable at times, disorganized,  cannot make out what she was saying at times due to disorganization.  Pt disheveled with poor self care.  Pt reported not sleeping at night.  Pt denied SI/HI/I/P or AH/VH or paranoia.  Pt reported eating well. Pt reported she was admitted because her stomach is growing.  Pt labile, irritable and agitated on some days, refusing to engage with  services.  Pt during the beginning  of her hospitalization was refusing medication, then eventually became compliant.  Pt restarted on Depakote titrated to 750mg PO BID.  Pt refused a valproic acid level during her hospitalization and got angry when blood work was discussed.  Pt was restarted on Invega 3mg PO at bedtime.  Pt was restarted on Klonopin titrated to 0.5mg PO daily and 1mg PO at bedtime.  Pt was restarted on Zyprexa titrated to 20mg PO at bedtime.  On the combination of medication patient showed improvement in mood lability and self care.  Pt did not require emergency IM medication during her stay.     On admission, patient irritable at times, disorganized,  cannot make out what she was saying at times due to disorganization.  Pt disheveled with poor self care.  Pt reported not sleeping at night.  Pt denied SI/HI/I/P or AH/VH or paranoia.  Pt reported eating well. Pt reported she was admitted because her stomach is growing.  Pt labile, irritable and agitated on some days, refusing to engage with  services.  Pt during the beginning  of her hospitalization was refusing medication, then eventually became compliant.  Pt restarted on Depakote titrated to 750mg PO BID.  Pt refused a valproic acid level during her hospitalization and got angry when blood work was discussed.  Pt was restarted on Invega 3mg PO at bedtime.  Pt was restarted on Klonopin titrated to 0.5mg PO daily and 1mg PO at bedtime.  Pt was restarted on Zyprexa titrated to 20mg PO at bedtime.  On the combination of medication patient showed improvement in mood lability and self care.  Pt did not require emergency IM medication during her stay.  Pt was projected to discharge prior to her actual discharge date, but began refusing  services, so discharge was postponed, then patient became more compliant.  Per previous chart notes, patient at her psychotic baseline at time of discharge where patient is disorganized, but not aggressive.        Although patient was admitted due to risk factors for violence/suicide including psychotic and mood sx, the patient’s risk for suicide/violence was mitigated during her hospitalization and her protective factors outweigh her risk factors at this time.  Pt is currently at low acute risk for suicide/violence.  Patient’s protective factors include:  no current SI/HI/I/P, compliance with medication, ACT and AOT in the community, residential stability, no known hx of trauma, no hx of substance abuse, no legal hx, and no current acute depressive, psychotic or manic sx.  Although the patient is at chronic risk for violence/suicide given her hx of psychiatric illness, hx of psychiatric hospitalizations, hx of aggression, and hx of suicide attempts, this risk cannot be ameliorated by continued inpatient treatment.  The patient no longer met the criteria for psychiatric hospitalization at discharge.        On admission, patient irritable at times, disorganized,  cannot make out what she was saying at times due to disorganization.  Pt disheveled with poor self care.  Pt reported not sleeping at night.  Pt denied SI/HI/I/P or AH/VH or paranoia.  Pt reported eating well. Pt reported she was admitted because her stomach is growing.  Pt labile, irritable and agitated on some days, refusing to engage with  services.  Pt during the beginning  of her hospitalization was refusing medication, then eventually became compliant.  Pt restarted on Depakote titrated to 750mg PO BID.  Pt refused a valproic acid level during her hospitalization and got angry when blood work was discussed.  Pt was restarted on Invega 3mg PO at bedtime.  Pt was restarted on Klonopin titrated to 0.5mg PO daily and 1mg PO at bedtime.  Pt was restarted on Zyprexa titrated to 20mg PO at bedtime.  On the combination of medication patient showed improvement in mood lability and self care.  Pt did not require emergency IM medication during her stay.  Pt was projected to discharge prior to her actual discharge date, but began refusing  services, so discharge was postponed, then patient became more compliant.  Per previous chart notes, patient at her psychotic baseline at time of discharge where patient is disorganized, but not aggressive.    On discharge, pt compliant with medication and tolerating it well.  No events reported overnight.  Pt denied SI/HI/I/P or AH/VH.  Pt reported sleeping well.  Pt advised of discharge and patient agreeable and expressed no concerns.      Although patient was admitted due to risk factors for violence/suicide including psychotic and mood sx, the patient’s risk for suicide/violence was mitigated during her hospitalization and her protective factors outweigh her risk factors at this time.  Pt is currently at low acute risk for suicide/violence.  Patient’s protective factors include:  no current SI/HI/I/P, compliance with medication, ACT and AOT in the community, residential stability, no known hx of trauma, no hx of substance abuse, no legal hx, and no current acute depressive, psychotic or manic sx.  Although the patient is at chronic risk for violence/suicide given her hx of psychiatric illness, hx of psychiatric hospitalizations, hx of aggression, and hx of suicide attempts, this risk cannot be ameliorated by continued inpatient treatment.  The patient no longer met the criteria for psychiatric hospitalization at discharge.

## 2021-03-09 NOTE — BH INPATIENT PSYCHIATRY DISCHARGE NOTE - NSDCMRMEDTOKEN_GEN_ALL_CORE_FT
benztropine 0.5 mg oral tablet: 1 tab(s) orally 2 times a day  clonazePAM 1 mg oral tablet: 1 tab(s) orally once a day (at bedtime) MDD:1mg  divalproex sodium 500 mg oral tablet, extended release: 2 tab(s) orally once a day (at bedtime)  OLANZapine 20 mg oral tablet: 1 tab(s) orally once a day (at bedtime)  paliperidone 3 mg oral tablet, extended release: 1 tab(s) orally once a day (at bedtime)   benztropine 0.5 mg oral tablet: 1 tab(s) orally 2 times a day  cholecalciferol oral tablet: 4000 unit(s) orally 2 times a day  clonazePAM 0.5 mg oral tablet: 1 tab(s) orally once a day MDD:0.5mg  clonazePAM 1 mg oral tablet: 1 tab(s) orally once a day (at bedtime) MDD:1mg  divalproex sodium 250 mg oral delayed release tablet: 1 tab(s) orally 2 times a day   divalproex sodium 500 mg oral delayed release tablet: 1 tab(s) orally 2 times a day   Invega Trinza 819 mg/2.625 mL intramuscular suspension, extended release: 819 milligram(s) intramuscular every 3 months (next due 3/15/21)  OLANZapine 20 mg oral tablet: 1 tab(s) orally once a day (at bedtime)  paliperidone 3 mg oral tablet, extended release: 1 tab(s) orally once a day (at bedtime)

## 2021-03-09 NOTE — BH DISCHARGE NOTE NURSING/SOCIAL WORK/PSYCH REHAB - PATIENT PORTAL LINK FT
You can access the FollowMyHealth Patient Portal offered by [facility] by registering at the following website: [email or web address]. By joining [employer]’s FollowMyHealth portal, you will also be able to view your health information using other applications (apps) compatible with our system.

## 2021-03-10 VITALS — DIASTOLIC BLOOD PRESSURE: 63 MMHG | TEMPERATURE: 97 F | SYSTOLIC BLOOD PRESSURE: 105 MMHG | HEART RATE: 93 BPM

## 2021-03-10 PROCEDURE — 99238 HOSP IP/OBS DSCHRG MGMT 30/<: CPT

## 2021-03-10 RX ORDER — CLONAZEPAM 1 MG
0.5 TABLET ORAL DAILY
Refills: 0 | Status: DISCONTINUED | OUTPATIENT
Start: 2021-03-10 | End: 2021-03-10

## 2021-03-10 RX ORDER — CLONAZEPAM 1 MG
1 TABLET ORAL AT BEDTIME
Refills: 0 | Status: DISCONTINUED | OUTPATIENT
Start: 2021-03-10 | End: 2021-03-10

## 2021-03-10 RX ADMIN — Medication 0.5 MILLIGRAM(S): at 08:23

## 2021-03-10 RX ADMIN — Medication 4000 UNIT(S): at 09:00

## 2021-03-10 RX ADMIN — DIVALPROEX SODIUM 750 MILLIGRAM(S): 500 TABLET, DELAYED RELEASE ORAL at 08:23

## 2021-03-10 NOTE — BH INPATIENT PSYCHIATRY PROGRESS NOTE - NSBHMSEAFFRANGE_PSY_A_CORE
Labile
Labile
Constricted
Labile
Constricted
Constricted
Labile
Constricted
Labile
Labile
Constricted
Constricted
Labile
Constricted
Labile
Constricted

## 2021-03-10 NOTE — BH INPATIENT PSYCHIATRY PROGRESS NOTE - NSBHCONTPROVIDER_PSY_ALL_CORE
No...

## 2021-03-10 NOTE — BH INPATIENT PSYCHIATRY PROGRESS NOTE - NSBHMSEHYG_PSY_A_CORE
Good
Poor
Poor
Fair
Poor
Good
Fair
Poor
Fair
Poor
Fair

## 2021-03-10 NOTE — BH INPATIENT PSYCHIATRY PROGRESS NOTE - MSE OPTIONS
Structured MSE

## 2021-03-10 NOTE — BH INPATIENT PSYCHIATRY PROGRESS NOTE - NSICDXBHPRIMARYDX_PSY_ALL_CORE
Schizoaffective disorder   F25.9  

## 2021-03-10 NOTE — BH INPATIENT PSYCHIATRY PROGRESS NOTE - NSBHCHARTREVIEWVS_PSY_A_CORE FT
Vital Signs Last 24 Hrs  T(C): 36.3 (24 Feb 2021 06:24), Max: 36.6 (23 Feb 2021 18:23)  T(F): 97.4 (24 Feb 2021 06:24), Max: 97.8 (23 Feb 2021 18:23)  HR: 90 (24 Feb 2021 06:24) (90 - 90)  BP: 120/77 (24 Feb 2021 06:24) (120/77 - 120/77)  BP(mean): --  RR: --  SpO2: --
Vital Signs Last 24 Hrs  T(C): 36.5 (18 Feb 2021 06:00), Max: 36.8 (17 Feb 2021 18:38)  T(F): 97.7 (18 Feb 2021 06:00), Max: 98.2 (17 Feb 2021 18:38)  HR: --  BP: --  BP(mean): --  RR: --  SpO2: --
Vital Signs Last 24 Hrs  T(C): 36.9 (05 Mar 2021 06:25), Max: 36.9 (04 Mar 2021 20:23)  T(F): 98.5 (05 Mar 2021 06:25), Max: 98.5 (04 Mar 2021 20:23)  HR: 102 (05 Mar 2021 06:25) (102 - 102)  BP: 120/82 (05 Mar 2021 06:25) (120/82 - 120/82)  BP(mean): --  RR: --  SpO2: --
Vital Signs Last 24 Hrs  T(C): 36.2 (23 Feb 2021 07:07), Max: 36.2 (23 Feb 2021 07:07)  T(F): 97.1 (23 Feb 2021 07:07), Max: 97.1 (23 Feb 2021 07:07)  HR: 95 (23 Feb 2021 07:07) (95 - 95)  BP: 117/74 (23 Feb 2021 07:07) (117/74 - 117/74)  BP(mean): --  RR: --  SpO2: --
Vital Signs Last 24 Hrs  T(C): 36.3 (09 Mar 2021 06:47), Max: 36.3 (09 Mar 2021 06:47)  T(F): 97.4 (09 Mar 2021 06:47), Max: 97.4 (09 Mar 2021 06:47)  HR: --  BP: 119/81 (09 Mar 2021 06:47) (119/81 - 119/81)  BP(mean): 100 (09 Mar 2021 06:47) (100 - 100)  RR: 18 (09 Mar 2021 06:47) (18 - 18)  SpO2: 98% (09 Mar 2021 06:47) (98% - 98%)
Vital Signs Last 24 Hrs  T(C): 36.5 (25 Feb 2021 06:33), Max: 36.5 (25 Feb 2021 06:33)  T(F): 97.7 (25 Feb 2021 06:33), Max: 97.7 (25 Feb 2021 06:33)  HR: --  BP: --  BP(mean): --  RR: --  SpO2: --
Vital Signs Last 24 Hrs  T(C): 36.3 (19 Feb 2021 06:37), Max: 37.1 (18 Feb 2021 18:37)  T(F): 97.4 (19 Feb 2021 06:37), Max: 98.8 (18 Feb 2021 18:37)  HR: 119 (19 Feb 2021 06:37) (119 - 119)  BP: 120/79 (19 Feb 2021 06:37) (120/79 - 120/79)  BP(mean): --  RR: --  SpO2: --
Vital Signs Last 24 Hrs  T(C): 36.6 (09 Feb 2021 08:46), Max: 36.6 (09 Feb 2021 08:46)  T(F): 97.8 (09 Feb 2021 08:46), Max: 97.8 (09 Feb 2021 08:46)  HR: --  BP: --  BP(mean): --  RR: --  SpO2: --
Vital Signs Last 24 Hrs  T(C): 36.7 (16 Feb 2021 06:26), Max: 36.7 (16 Feb 2021 06:26)  T(F): 98 (16 Feb 2021 06:26), Max: 98 (16 Feb 2021 06:26)  HR: --  BP: --  BP(mean): --  RR: --  SpO2: --
Vital Signs Last 24 Hrs  T(C): 36.3 (10 Mar 2021 07:47), Max: 36.3 (10 Mar 2021 07:47)  T(F): 97.3 (10 Mar 2021 07:47), Max: 97.3 (10 Mar 2021 07:47)  HR: 93 (10 Mar 2021 07:47) (93 - 93)  BP: 105/63 (10 Mar 2021 07:47) (105/63 - 105/63)  BP(mean): --  RR: --  SpO2: --
Vital Signs Last 24 Hrs  T(C): 36.4 (12 Feb 2021 08:35), Max: 36.6 (11 Feb 2021 19:41)  T(F): 97.6 (12 Feb 2021 08:35), Max: 97.8 (11 Feb 2021 19:41)  HR: 80 (12 Feb 2021 08:35) (80 - 80)  BP: 123/72 (12 Feb 2021 08:35) (123/72 - 123/72)  BP(mean): --  RR: --  SpO2: --
Vital Signs Last 24 Hrs  T(C): 36.4 (08 Feb 2021 08:15), Max: 36.4 (07 Feb 2021 19:31)  T(F): 97.5 (08 Feb 2021 08:15), Max: 97.6 (07 Feb 2021 19:31)  HR: --  BP: --  BP(mean): --  RR: --  SpO2: --
Vital Signs Last 24 Hrs  T(C): 36.2 (04 Mar 2021 06:39), Max: 36.9 (03 Mar 2021 18:50)  T(F): 97.2 (04 Mar 2021 06:39), Max: 98.5 (03 Mar 2021 18:50)  HR: 86 (04 Mar 2021 06:39) (86 - 86)  BP: 117/79 (04 Mar 2021 06:39) (117/79 - 117/79)  BP(mean): --  RR: --  SpO2: --
Vital Signs Last 24 Hrs  T(C): 36.1 (17 Feb 2021 06:35), Max: 36.4 (16 Feb 2021 19:11)  T(F): 97 (17 Feb 2021 06:35), Max: 97.5 (16 Feb 2021 19:11)  HR: --  BP: 113/70 (17 Feb 2021 06:35) (113/70 - 113/70)  BP(mean): 78 (17 Feb 2021 06:35) (78 - 78)  RR: 17 (17 Feb 2021 06:35) (17 - 17)  SpO2: 99% (17 Feb 2021 06:35) (99% - 99%)
Vital Signs Last 24 Hrs  T(C): 36.3 (01 Mar 2021 06:56), Max: 36.5 (28 Feb 2021 20:14)  T(F): 97.4 (01 Mar 2021 06:56), Max: 97.7 (28 Feb 2021 20:14)  HR: --  BP: --  BP(mean): --  RR: --  SpO2: --
Vital Signs Last 24 Hrs  T(C): 36.4 (09 Feb 2021 19:44), Max: 36.4 (09 Feb 2021 19:44)  T(F): 97.5 (09 Feb 2021 19:44), Max: 97.5 (09 Feb 2021 19:44)  HR: --  BP: --  BP(mean): --  RR: --  SpO2: --
Vital Signs Last 24 Hrs  T(C): --  T(F): --  HR: --  BP: --  BP(mean): --  RR: --  SpO2: --
Vital Signs Last 24 Hrs  T(C): 36.3 (22 Feb 2021 06:39), Max: 37.1 (21 Feb 2021 19:44)  T(F): 97.3 (22 Feb 2021 06:39), Max: 98.8 (21 Feb 2021 19:44)  HR: 91 (22 Feb 2021 06:39) (91 - 91)  BP: 119/75 (22 Feb 2021 06:39) (119/75 - 119/75)  BP(mean): --  RR: --  SpO2: --
Vital Signs Last 24 Hrs  T(C): 37.1 (03 Mar 2021 08:13), Max: 37.1 (02 Mar 2021 18:47)  T(F): 98.7 (03 Mar 2021 08:13), Max: 98.7 (02 Mar 2021 18:47)  HR: --  BP: --  BP(mean): --  RR: --  SpO2: --
Vital Signs Last 24 Hrs  T(C): 36.1 (02 Mar 2021 07:47), Max: 36.4 (01 Mar 2021 19:36)  T(F): 96.9 (02 Mar 2021 07:47), Max: 97.5 (01 Mar 2021 19:36)  HR: 86 (02 Mar 2021 07:47) (86 - 86)  BP: 101/72 (02 Mar 2021 07:47) (101/72 - 101/72)  BP(mean): --  RR: --  SpO2: --
Vital Signs Last 24 Hrs  T(C): 36.2 (08 Mar 2021 06:45), Max: 36.8 (07 Mar 2021 19:06)  T(F): 97.2 (08 Mar 2021 06:45), Max: 98.2 (07 Mar 2021 19:06)  HR: 106 (08 Mar 2021 06:45) (106 - 106)  BP: 120/74 (08 Mar 2021 06:45) (120/74 - 120/74)  BP(mean): --  RR: --  SpO2: --
Vital Signs Last 24 Hrs  T(C): 36.4 (26 Feb 2021 06:49), Max: 36.4 (25 Feb 2021 18:26)  T(F): 97.6 (26 Feb 2021 06:49), Max: 97.6 (26 Feb 2021 06:49)  HR: 89 (26 Feb 2021 06:49) (89 - 89)  BP: 107/75 (26 Feb 2021 06:49) (107/75 - 107/75)  BP(mean): --  RR: --  SpO2: --
Vital Signs Last 24 Hrs  T(C): 36.7 (07 Feb 2021 06:28), Max: 36.7 (07 Feb 2021 06:28)  T(F): 98.1 (07 Feb 2021 06:28), Max: 98.1 (07 Feb 2021 06:28)  HR: 106 (07 Feb 2021 06:28) (106 - 106)  BP: 117/65 (07 Feb 2021 06:28) (117/65 - 117/65)  BP(mean): --  RR: --  SpO2: --
Vital Signs Last 24 Hrs  T(C): 36.7 (10 Feb 2021 19:32), Max: 36.7 (10 Feb 2021 19:32)  T(F): 98 (10 Feb 2021 19:32), Max: 98 (10 Feb 2021 19:32)  HR: --  BP: --  BP(mean): --  RR: --  SpO2: --

## 2021-03-10 NOTE — BH INPATIENT PSYCHIATRY PROGRESS NOTE - NSDCCRITERIA_PSY_ALL_CORE
remission of sx

## 2021-03-10 NOTE — BH INPATIENT PSYCHIATRY PROGRESS NOTE - NSBHFUPINTERVALCCFT_PSY_A_CORE
Psychosis
Pt seen f/u for psychosis utilizing [employer]  ID#:  [id number]
Psychosis
Pt seen f/u for psychosis utilizing [employer]  ID #:  [id number]
Pt seen f/u for psychosis utilizing [employer]  ID#:  [id number]
Pt seen f/u for psychosis, utilizing [employer]  ID #:  [id number]
Pt seen f/u for psychosis utilizing [employer]  ID #:  [id number]
Pt seen f/u for psychosis utilizing [employer]  ID #:  [id number]
Pt seen f/u for psychosis utilizing [employer]  ID#:  [id number]
Pt seen f/u for psychosis utilizing [employer]  ID#:  [id number]
Pt seen f/u for psychosis utilizing [employer]  #:  [id number]
Pt seen f/u for psychosis utilizing [employer]  ID #:  [id number]
Pt seen f/u for psychosis utilizing [employer]  ID #:  [id number]
Pt seen f/u for psychosis utilizing [employer]  ID #:  [id number] then [facility]  ID #:  [id number]
Pt seen f/u for psychosis utilizing [employer]  ID#:  [id number]
Pt seen f/u for psychosis utilizing [employer]  [id number]
Pt seen f/u for psychosis utilizing [employer]  ID#:  [id number]
Pt seen f/u for psychosis utilizing [employer]  ID#:  [id number]
Pt seen f/u for psychosis utilizing [employer]  ID #:  [id number]
Pt seen f/u for psychosis utilizing [employer]  ID #:  [id number]
Pt seen f/u for psychosis utilizing [employer] [facility] [employer], [employer] [place]
Pt seen f/u for psychosis utilizing [facility]  ID#:  [id number]
disorganization 
Disorganization.

## 2021-03-10 NOTE — BH INPATIENT PSYCHIATRY PROGRESS NOTE - NSTXDCOPNODATEEST_PSY_ALL_CORE
05-Feb-2021
26-Feb-2021
05-Mar-2021
05-Feb-2021
12-Feb-2021
12-Feb-2021
26-Feb-2021
12-Feb-2021
05-Mar-2021
05-Feb-2021
26-Feb-2021
12-Feb-2021
12-Feb-2021
26-Feb-2021
05-Feb-2021
05-Mar-2021
26-Feb-2021
12-Feb-2021
05-Feb-2021
12-Feb-2021
26-Feb-2021
05-Feb-2021
12-Feb-2021
05-Feb-2021

## 2021-03-10 NOTE — BH INPATIENT PSYCHIATRY PROGRESS NOTE - NSTXPSYCHODATEEST_PSY_ALL_CORE
05-Feb-2021
03-Mar-2021
05-Feb-2021
03-Mar-2021
05-Feb-2021
03-Mar-2021
03-Mar-2021
05-Feb-2021

## 2021-03-10 NOTE — BH INPATIENT PSYCHIATRY PROGRESS NOTE - NSBHMSESPEECH_PSY_A_CORE
Abnormal as indicated, otherwise normal...

## 2021-03-10 NOTE — BH INPATIENT PSYCHIATRY PROGRESS NOTE - NSBHASSESSSUMMFT_PSY_ALL_CORE
46 F, Mandarin-speaking, domiciled at a group home [facility], PMHx of allergic rhinitis, GERD, PPHx of schizoaffective disorder, multiple hospitalizations including a 440-day hospitalization from 4/2016-6/2017, most recently hospitalized in 1/2021 for psychosis, followed by ACT; brought to the hospital by EMS activated by her residence for decompensation in setting of med non adherence. Per ACT supervisor, she was unresponsive, staring, very rigid, and different from baseline function/presentation.    --Restart psychotropic medications:  Invega 3mg PO at bedtime, Zyprexa changed to 5mg PO BID, Cogentin 0.5mg PO BID, Depakote ER changed to Depakote DR 250mg PO daily and 500mg PO at bedtime with Invega Trinza 819mg IM due on 3/15/21  --Group therapy as tolerated
46 F, Mandarin-speaking, domiciled at a group home [facility], PMHx of allergic rhinitis, GERD, PPHx of schizoaffective disorder, multiple hospitalizations including a 440-day hospitalization from 4/2016-6/2017, most recently hospitalized in 1/2021 for psychosis, followed by ACT; brought to the hospital by EMS activated by her residence for decompensation in setting of med non adherence. Per ACT supervisor, she was unresponsive, staring, very rigid, and different from baseline function/presentation.    --Restart psychotropic medications:  Invega 3mg PO at bedtime, Zyprexa titrated and changed to 15mg PO at bedtime, Cogentin 0.5mg PO BID, Depakote ER changed to Depakote DR  500mg PO at BID with Invega Trinza 819mg IM due on 3/15/21  --Group therapy as tolerated
46 F, Mandarin-speaking, domiciled at a group home [facility], PMHx of allergic rhinitis, GERD, PPHx of schizoaffective disorder, multiple hospitalizations including a 440-day hospitalization from 4/2016-6/2017, most recently hospitalized in 1/2021 for psychosis, followed by ACT; brought to the hospital by EMS activated by her residence for decompensation in setting of med non adherence. Per ACT supervisor, she was unresponsive, staring, very rigid, and different from baseline function/presentation.    --Restart psychotropic medications:  Invega 3mg PO at bedtime, Zyprexa titrated and changed to 25mg PO at bedtime, Cogentin 0.5mg PO BID, Depakote ER changed to Depakote DR  titrated to 750mg PO at BID with Invega Trinza 819mg IM due on 3/15/21, Klonopin titrated to 0.5mg PO daily and 1mg PO at bedtime  --Group therapy as tolerated
46 F, Mandarin-speaking, domiciled at a group home [facility], PMHx of allergic rhinitis, GERD, PPHx of schizoaffective disorder, multiple hospitalizations including a 440-day hospitalization from 4/2016-6/2017, most recently hospitalized in 1/2021 for psychosis, followed by ACT; brought to the hospital by EMS activated by her residence for decompensation in setting of med non adherence. Per ACT supervisor, she was unresponsive, staring, very rigid, and different from baseline function/presentation.    --Restart psychotropic medications:  Invega 3mg PO at bedtime, Zyprexa changed to 5mg PO BID, Cogentin 0.5mg PO BID, Depakote ER changed to Depakote DR  500mg PO at BID with Invega Trinza 819mg IM due on 3/15/21  --Group therapy as tolerated
46 F, Mandarin-speaking, domiciled at a group home [email or web address] PMHx of allergic rhinitis, GERD, PPHx of schizoaffective disorder, multiple hospitalizations including a 440-day hospitalization from 4/2016-6/2017, most recently hospitalized in 1/2021 for psychosis, followed by ACT; brought to the hospital by EMS activated by her residence for decompensation in setting of med non adherence. Per ACT supervisor, she was unresponsive, staring, very rigid, and different from baseline function/presentation.    --Restart psychotropic medications:  Invega 3mg PO at bedtime, Zyprexa Zydis 10mg PO at bedtime, Cogentin 0.5mg PO BID, Depakote ER 750mg PO at bedtime with Invega Trinza 819mg IM due on 3/15/21  --Group therapy as tolerated
>Obs: Routine, no current SI. no need for CO, patient not expected to pose risk to self or others in controlled inpatient setting  >Psychiatric Meds: Continue with current medication regimen. Encouraged compliance  >Social: milieu/structured therapy  >Treatment: Group therapy and individual therapy, coping skills development for emotion management and mood regulation. Motivational counseling for substance abuse related issues.   >Continue to provide therapeutic interventions in support of treatment goals.  >Dispo: Continues to require inpatient level of care for treatment of psychotic symptoms.   
46 F, Mandarin-speaking, domiciled at a group home [facility], PMHx of allergic rhinitis, GERD, PPHx of schizoaffective disorder, multiple hospitalizations including a 440-day hospitalization from 4/2016-6/2017, most recently hospitalized in 1/2021 for psychosis, followed by ACT; brought to the hospital by EMS activated by her residence for decompensation in setting of med non adherence. Per ACT supervisor, she was unresponsive, staring, very rigid, and different from baseline function/presentation.    --Restart psychotropic medications:  Invega 3mg PO at bedtime, Zyprexa changed to 5mg PO BID, Cogentin 0.5mg PO BID, Depakote ER changed to Depakote DR 250mg PO BID with Invega Trinza 819mg IM due on 3/15/21  --Group therapy as tolerated
46 F, Mandarin-speaking, domiciled at a group home [facility], PMHx of allergic rhinitis, GERD, PPHx of schizoaffective disorder, multiple hospitalizations including a 440-day hospitalization from 4/2016-6/2017, most recently hospitalized in 1/2021 for psychosis, followed by ACT; brought to the hospital by EMS activated by her residence for decompensation in setting of med non adherence. Per ACT supervisor, she was unresponsive, staring, very rigid, and different from baseline function/presentation.    --Restart psychotropic medications:  Invega 3mg PO at bedtime, Zyprexa titrated and changed to 20mg PO at bedtime, Cogentin 0.5mg PO BID, Depakote ER changed to Depakote DR  500mg PO at BID with Invega Trinza 819mg IM due on 3/15/21, will attempt valproic acid level again tomorrow AM  --Group therapy as tolerated
46 F, Mandarin-speaking, domiciled at a group home [facility], PMHx of allergic rhinitis, GERD, PPHx of schizoaffective disorder, multiple hospitalizations including a 440-day hospitalization from 4/2016-6/2017, most recently hospitalized in 1/2021 for psychosis, followed by ACT; brought to the hospital by EMS activated by her residence for decompensation in setting of med non adherence. Per ACT supervisor, she was unresponsive, staring, very rigid, and different from baseline function/presentation.    --Restart psychotropic medications:  Invega 3mg PO at bedtime, Zyprexa changed to 5mg PO BID, Cogentin 0.5mg PO BID, Depakote ER changed to Depakote DR 250mg PO BID with Invega Trinza 819mg IM due on 3/15/21  --Group therapy as tolerated
46 F, Mandarin-speaking, domiciled at a group home [facility], PMHx of allergic rhinitis, GERD, PPHx of schizoaffective disorder, multiple hospitalizations including a 440-day hospitalization from 4/2016-6/2017, most recently hospitalized in 1/2021 for psychosis, followed by ACT; brought to the hospital by EMS activated by her residence for decompensation in setting of med non adherence. Per ACT supervisor, she was unresponsive, staring, very rigid, and different from baseline function/presentation.    --Restart psychotropic medications:  Invega 3mg PO at bedtime, Zyprexa titrated and changed to 25mg PO at bedtime, Cogentin 0.5mg PO BID, Depakote ER changed to Depakote DR  titrated to 750mg PO at BID with Invega Trinza 819mg IM due on 3/15/21, Klonopin titrated to 0.5mg PO daily and 1mg PO at bedtime  --Group therapy as tolerated
46 F, Mandarin-speaking, domiciled at a group home [facility], PMHx of allergic rhinitis, GERD, PPHx of schizoaffective disorder, multiple hospitalizations including a 440-day hospitalization from 4/2016-6/2017, most recently hospitalized in 1/2021 for psychosis, followed by ACT; brought to the hospital by EMS activated by her residence for decompensation in setting of med non adherence. Per ACT supervisor, she was unresponsive, staring, very rigid, and different from baseline function/presentation.    --Restart psychotropic medications:  Invega 3mg PO at bedtime, Zyprexa titrated and changed to 25mg PO at bedtime, Cogentin 0.5mg PO BID, Depakote ER changed to Depakote DR  titrated to 750mg PO at BID with Invega Trinza 819mg IM due on 3/15/21, Klonopin titrated to 0.5mg PO daily and 1mg PO at bedtime  --Group therapy as tolerated
46 F, Mandarin-speaking, domiciled at a group home [facility], PMHx of allergic rhinitis, GERD, PPHx of schizoaffective disorder, multiple hospitalizations including a 440-day hospitalization from 4/2016-6/2017, most recently hospitalized in 1/2021 for psychosis, followed by ACT; brought to the hospital by EMS activated by her residence for decompensation in setting of med non adherence. Per ACT supervisor, she was unresponsive, staring, very rigid, and different from baseline function/presentation.    --Restart psychotropic medications:  Invega titrated to 6mg PO at bedtime, Zyprexa changed to 20mg PO at bedtime, Cogentin 0.5mg PO BID, Depakote ER changed to Depakote DR  titrated to 750mg PO at BID with Invega Trinza 819mg IM due on 3/15/21, Klonopin titrated to 0.5mg PO daily and 1mg PO at bedtime  --Group therapy as tolerated
46 F, Mandarin-speaking, domiciled at a group home [facility], PMHx of allergic rhinitis, GERD, PPHx of schizoaffective disorder, multiple hospitalizations including a 440-day hospitalization from 4/2016-6/2017, most recently hospitalized in 1/2021 for psychosis, followed by ACT; brought to the hospital by EMS activated by her residence for decompensation in setting of med non adherence. Per ACT supervisor, she was unresponsive, staring, very rigid, and different from baseline function/presentation.    --Restart psychotropic medications:  Invega 3mg PO at bedtime, Zyprexa titrated and changed to 20mg PO at bedtime, Cogentin 0.5mg PO BID, Depakote ER changed to Depakote DR  titrated to 750mg PO at BID with Invega Trinza 819mg IM due on 3/15/21  --Group therapy as tolerated
46 F, Mandarin-speaking, domiciled at a group home [facility], PMHx of allergic rhinitis, GERD, PPHx of schizoaffective disorder, multiple hospitalizations including a 440-day hospitalization from 4/2016-6/2017, most recently hospitalized in 1/2021 for psychosis, followed by ACT; brought to the hospital by EMS activated by her residence for decompensation in setting of med non adherence. Per ACT supervisor, she was unresponsive, staring, very rigid, and different from baseline function/presentation.    --Restart psychotropic medications:  Invega 3mg PO at bedtime, Zyprexa Zydis 10mg PO at bedtime, Cogentin 0.5mg PO BID, Depakote ER 750mg PO at bedtime with Invega Trinza 819mg IM due on 3/15/21  --Group therapy as tolerated
46 F, Mandarin-speaking, domiciled at a group [facility], PMHx of allergic rhinitis, GERD, PPHx of schizoaffective disorder, multiple hospitalizations including a 440-day hospitalization from 4/2016-6/2017, most recently hospitalized in 1/2021 for psychosis, followed by ACT; brought to the hospital by EMS activated by her residence for decompensation in setting of med non adherence. Per ACT supervisor, she was unresponsive, staring, very rigid, and different from baseline function/presentation.    --Restart psychotropic medications:  Invega tapered back down to 3mg PO at bedtime for patient sedation, Zyprexa changed to 20mg PO at bedtime, Cogentin 0.5mg PO BID, Depakote ER changed to Depakote DR  titrated to 750mg PO at BID with Invega Trinza 819mg IM due on 3/15/21, Klonopin titrated to 0.5mg PO daily and 1mg PO at bedtime  --discharge back to residence today with ACT and AOT
46 F, Mandarin-speaking, domiciled at a group home [facility], PMHx of allergic rhinitis, GERD, PPHx of schizoaffective disorder, multiple hospitalizations including a 440-day hospitalization from 4/2016-6/2017, most recently hospitalized in 1/2021 for psychosis, followed by ACT; brought to the hospital by EMS activated by her residence for decompensation in setting of med non adherence. Per ACT supervisor, she was unresponsive, staring, very rigid, and different from baseline function/presentation.    --Restart psychotropic medications:  Invega 3mg PO at bedtime, Zyprexa titrated and changed to 20mg PO at bedtime, Cogentin 0.5mg PO BID, Depakote ER changed to Depakote DR  titrated to 750mg PO at BID with Invega Trinza 819mg IM due on 3/15/21  --Group therapy as tolerated
46 F, Mandarin-speaking, domiciled at a group home [facility], PMHx of allergic rhinitis, GERD, PPHx of schizoaffective disorder, multiple hospitalizations including a 440-day hospitalization from 4/2016-6/2017, most recently hospitalized in 1/2021 for psychosis, followed by ACT; brought to the hospital by EMS activated by her residence for decompensation in setting of med non adherence. Per ACT supervisor, she was unresponsive, staring, very rigid, and different from baseline function/presentation.    --Restart psychotropic medications:  Invega 3mg PO at bedtime, Zyprexa titrated and changed to 25mg PO at bedtime, Cogentin 0.5mg PO BID, Depakote ER changed to Depakote DR  titrated to 750mg PO at BID with Invega Trinza 819mg IM due on 3/15/21  --Group therapy as tolerated
>Obs: Routine, no current SI. no need for CO, patient not expected to pose risk to self or others in controlled inpatient setting  >Psychiatric Meds: Titrate Depakote 750mg tonight. Continue with current medication regimen. Encouraged compliance  >Social: milieu/structured therapy  >Treatment: Group therapy and individual therapy, coping skills development for emotion management and mood regulation. Motivational counseling for substance abuse related issues.   >Continue to provide therapeutic interventions in support of treatment goals.  >Dispo: Continues to require inpatient level of care for treatment of psychotic symptoms.   
46 F, Mandarin-speaking, domiciled at a group home [facility], PMHx of allergic rhinitis, GERD, PPHx of schizoaffective disorder, multiple hospitalizations including a 440-day hospitalization from 4/2016-6/2017, most recently hospitalized in 1/2021 for psychosis, followed by ACT; brought to the hospital by EMS activated by her residence for decompensation in setting of med non adherence. Per ACT supervisor, she was unresponsive, staring, very rigid, and different from baseline function/presentation.    --Restart psychotropic medications:  Invega 3mg PO at bedtime, Zyprexa titrated and changed to 15mg PO at bedtime for tomorrow, Cogentin 0.5mg PO BID, Depakote ER changed to Depakote DR  500mg PO at BID with Invega Trinza 819mg IM due on 3/15/21  --Group therapy as tolerated
46 F, Mandarin-speaking, domiciled at a group home [facility], PMHx of allergic rhinitis, GERD, PPHx of schizoaffective disorder, multiple hospitalizations including a 440-day hospitalization from 4/2016-6/2017, most recently hospitalized in 1/2021 for psychosis, followed by ACT; brought to the hospital by EMS activated by her residence for decompensation in setting of med non adherence. Per ACT supervisor, she was unresponsive, staring, very rigid, and different from baseline function/presentation.    --Restart psychotropic medications:  Invega 3mg PO at bedtime, Zyprexa titrated and changed to 15mg PO at bedtime, Cogentin 0.5mg PO BID, Depakote ER changed to Depakote DR  500mg PO at BID with Invega Trinza 819mg IM due on 3/15/21  --Group therapy as tolerated
46 F, Mandarin-speaking, domiciled at a group home [facility], PMHx of allergic rhinitis, GERD, PPHx of schizoaffective disorder, multiple hospitalizations including a 440-day hospitalization from 4/2016-6/2017, most recently hospitalized in 1/2021 for psychosis, followed by ACT; brought to the hospital by EMS activated by her residence for decompensation in setting of med non adherence. Per ACT supervisor, she was unresponsive, staring, very rigid, and different from baseline function/presentation.    --Restart psychotropic medications:  Invega tapered back down to 3mg PO at bedtime for patient sedation, Zyprexa changed to 20mg PO at bedtime, Cogentin 0.5mg PO BID, Depakote ER changed to Depakote DR  titrated to 750mg PO at BID with Invega Trinza 819mg IM due on 3/15/21, Klonopin titrated to 0.5mg PO daily and 1mg PO at bedtime  --Group therapy as tolerated
46 F, Mandarin-speaking, domiciled at a group home [facility], PMHx of allergic rhinitis, GERD, PPHx of schizoaffective disorder, multiple hospitalizations including a 440-day hospitalization from 4/2016-6/2017, most recently hospitalized in 1/2021 for psychosis, followed by ACT; brought to the hospital by EMS activated by her residence for decompensation in setting of med non adherence. Per ACT supervisor, she was unresponsive, staring, very rigid, and different from baseline function/presentation.    --Restart psychotropic medications:  Invega 3mg PO at bedtime, Zyprexa titrated and changed to 20mg PO at bedtime, Cogentin 0.5mg PO BID, Depakote ER changed to Depakote DR  500mg PO at BID with Invega Trinza 819mg IM due on 3/15/21, will attempt valproic acid level again tomorrow AM  --Group therapy as tolerated
46 F, Mandarin-speaking, domiciled at a group home [facility], PMHx of allergic rhinitis, GERD, PPHx of schizoaffective disorder, multiple hospitalizations including a 440-day hospitalization from 4/2016-6/2017, most recently hospitalized in 1/2021 for psychosis, followed by ACT; brought to the hospital by EMS activated by her residence for decompensation in setting of med non adherence. Per ACT supervisor, she was unresponsive, staring, very rigid, and different from baseline function/presentation.    --Restart psychotropic medications:  Invega 3mg PO at bedtime, Zyprexa titrated and changed to 25mg PO at bedtime, Cogentin 0.5mg PO BID, Depakote ER changed to Depakote DR  titrated to 750mg PO at BID with Invega Trinza 819mg IM due on 3/15/21, Klonopin titrated to 0.5mg PO daily and 1mg PO at bedtime  --Group therapy as tolerated
46 F, Mandarin-speaking, domiciled at a group home [facility], PMHx of allergic rhinitis, GERD, PPHx of schizoaffective disorder, multiple hospitalizations including a 440-day hospitalization from 4/2016-6/2017, most recently hospitalized in 1/2021 for psychosis, followed by ACT; brought to the hospital by EMS activated by her residence for decompensation in setting of med non adherence. Per ACT supervisor, she was unresponsive, staring, very rigid, and different from baseline function/presentation.    --Restart psychotropic medications:  Invega 3mg PO at bedtime, Zyprexa titrated and changed to 25mg PO at bedtime, Cogentin 0.5mg PO BID, Depakote ER changed to Depakote DR  titrated to 750mg PO at BID with Invega Trinza 819mg IM due on 3/15/21, Klonopin titrated to 0.5mg PO daily and 1mg PO at bedtime  --Group therapy as tolerated

## 2021-03-10 NOTE — BH INPATIENT PSYCHIATRY PROGRESS NOTE - NSTXPROBDCOPNO_PSY_ALL_CORE
DISCHARGE ISSUE - NON-ADHERENT WITH OUTPATIENT SERVICES

## 2021-03-10 NOTE — BH INPATIENT PSYCHIATRY PROGRESS NOTE - NSBHMSEINSIGHT_PSY_A_CORE
Poor
Unable to assess
Poor

## 2021-03-10 NOTE — BH INPATIENT PSYCHIATRY PROGRESS NOTE - NSBHMSERECMEM_PSY_A_CORE
Unable to assess
Normal
Unable to assess
Normal
Unable to assess
Normal
Unable to assess
Unable to assess
Normal
Unable to assess

## 2021-03-10 NOTE — BH INPATIENT PSYCHIATRY PROGRESS NOTE - NSBHINPTBILLING_PSY_ALL_CORE
18295 - Inpatient Moderate Complexity
61892 - Inpatient Moderate Complexity
73764 - Inpatient Moderate Complexity
72781 - Inpatient Moderate Complexity
59423 - Inpatient Moderate Complexity
24868 - Inpatient Moderate Complexity
99522 - Inpatient Moderate Complexity
68687 - Inpatient Moderate Complexity
39440 - Hospital Discharge Day Management; 30 min or less
05073 - Inpatient Moderate Complexity
11973 - Inpatient Moderate Complexity
29321 - Inpatient Moderate Complexity
53996 - Inpatient Moderate Complexity
10130 - Inpatient Moderate Complexity
30090 - Inpatient Moderate Complexity
78033 - Inpatient Moderate Complexity
16934 - Inpatient Moderate Complexity
00213 - Inpatient Moderate Complexity
92409 - Inpatient Moderate Complexity
40709 - Inpatient Moderate Complexity
74309 - Inpatient Moderate Complexity
97826 - Inpatient Moderate Complexity
16325 - Inpatient Moderate Complexity
14511 - Inpatient Moderate Complexity

## 2021-03-10 NOTE — BH INPATIENT PSYCHIATRY PROGRESS NOTE - NSTXMEDICDATEEST_PSY_ALL_CORE
05-Feb-2021
06-Feb-2021
06-Feb-2021
05-Feb-2021
06-Feb-2021
06-Feb-2021
05-Feb-2021
06-Feb-2021
05-Feb-2021
05-Feb-2021
06-Feb-2021
06-Feb-2021
05-Feb-2021
24-Feb-2021
06-Feb-2021
06-Feb-2021
24-Feb-2021
06-Feb-2021
05-Feb-2021
06-Feb-2021

## 2021-03-10 NOTE — BH INPATIENT PSYCHIATRY PROGRESS NOTE - NSBHMETABOLIC_PSY_ALL_CORE_FT
BMI: BMI (kg/m2): 35.9 (02-04-21 @ 17:20)  HbA1c:   Glucose:   BP: 120/74 (03-08-21 @ 06:45) (120/74 - 122/81)  Lipid Panel: 
BMI: BMI (kg/m2): 35.9 (02-04-21 @ 17:20)  HbA1c:   Glucose:   BP: 119/81 (03-09-21 @ 06:47) (119/81 - 120/74)  Lipid Panel: 
BMI: BMI (kg/m2): 35.9 (02-04-21 @ 17:20)  HbA1c:   Glucose:   BP: 117/65 (02-07-21 @ 06:28) (117/65 - 117/65)  Lipid Panel: 
BMI: BMI (kg/m2): 35.9 (02-04-21 @ 17:20)  HbA1c:   Glucose:   BP: --  Lipid Panel: 
BMI: BMI (kg/m2): 35.9 (02-04-21 @ 17:20)  HbA1c:   Glucose:   BP: --  Lipid Panel: 
BMI: BMI (kg/m2): 35.9 (02-04-21 @ 17:20)  HbA1c:   Glucose:   BP: 117/65 (02-07-21 @ 06:28) (117/65 - 117/65)  Lipid Panel: 
BMI: BMI (kg/m2): 35.9 (02-04-21 @ 17:20)  HbA1c:   Glucose:   BP: 131/87 (02-27-21 @ 06:32) (131/87 - 131/87)  Lipid Panel: 
BMI: BMI (kg/m2): 35.9 (02-04-21 @ 17:20)  HbA1c:   Glucose:   BP: 101/72 (03-02-21 @ 07:47) (101/72 - 101/72)  Lipid Panel: 
BMI: BMI (kg/m2): 35.9 (02-04-21 @ 17:20)  HbA1c:   Glucose:   BP: 113/70 (02-17-21 @ 06:35) (113/70 - 113/70)  Lipid Panel: 
BMI: BMI (kg/m2): 35.9 (02-04-21 @ 17:20)  HbA1c:   Glucose:   BP: 113/70 (02-17-21 @ 06:35) (113/70 - 113/70)  Lipid Panel: 
BMI: BMI (kg/m2): 35.9 (02-04-21 @ 17:20)  HbA1c:   Glucose:   BP: --  Lipid Panel: 
BMI: BMI (kg/m2): 35.9 (02-04-21 @ 17:20)  HbA1c:   Glucose:   BP: 117/79 (03-04-21 @ 06:39) (101/72 - 117/79)  Lipid Panel: 
BMI: BMI (kg/m2): 35.9 (02-04-21 @ 17:20)  HbA1c:   Glucose:   BP: 120/79 (02-19-21 @ 06:37) (113/70 - 120/79)  Lipid Panel: 
BMI: BMI (kg/m2): 35.9 (02-04-21 @ 17:20)  HbA1c:   Glucose:   BP: 129/66 (02-14-21 @ 06:58) (129/66 - 129/66)  Lipid Panel: 
BMI: BMI (kg/m2): 35.9 (02-04-21 @ 17:20)  HbA1c:   Glucose:   BP: 120/82 (03-05-21 @ 06:25) (101/72 - 120/82)  Lipid Panel: 
BMI: BMI (kg/m2): 35.9 (02-04-21 @ 17:20)  HbA1c:   Glucose:   BP: 105/63 (03-10-21 @ 07:47) (105/63 - 120/74)  Lipid Panel: 
BMI: BMI (kg/m2): 35.9 (02-04-21 @ 17:20)  HbA1c:   Glucose:   BP: 117/74 (02-23-21 @ 07:07) (111/75 - 119/75)  Lipid Panel: 
BMI: BMI (kg/m2): 35.9 (02-04-21 @ 17:20)  HbA1c:   Glucose:   BP: 120/77 (02-24-21 @ 06:24) (117/74 - 120/77)  Lipid Panel: 
BMI: BMI (kg/m2): 35.9 (02-04-21 @ 17:20)  HbA1c:   Glucose:   BP: 117/65 (02-07-21 @ 06:28) (117/65 - 117/65)  Lipid Panel: 
BMI: BMI (kg/m2): 35.9 (02-04-21 @ 17:20)  HbA1c:   Glucose:   BP: 123/72 (02-12-21 @ 08:35) (123/72 - 123/72)  Lipid Panel: 
BMI: BMI (kg/m2): 35.9 (02-04-21 @ 17:20)  HbA1c:   Glucose:   BP: 101/72 (03-02-21 @ 07:47) (101/72 - 101/72)  Lipid Panel: 
BMI: BMI (kg/m2): 35.9 (02-04-21 @ 17:20)  HbA1c:   Glucose:   BP: 107/75 (02-26-21 @ 06:49) (107/75 - 120/77)  Lipid Panel: 
BMI: BMI (kg/m2): 35.9 (02-04-21 @ 17:20)  HbA1c:   Glucose:   BP: 120/77 (02-24-21 @ 06:24) (117/74 - 120/77)  Lipid Panel: 
BMI: BMI (kg/m2): 35.9 (02-04-21 @ 17:20)  HbA1c:   Glucose:   BP: 119/75 (02-22-21 @ 06:39) (110/68 - 119/75)  Lipid Panel:

## 2021-03-10 NOTE — BH INPATIENT PSYCHIATRY PROGRESS NOTE - NSTXPSYCHODATETRGT_PSY_ALL_CORE
10-Mar-2021
12-Feb-2021
10-Mar-2021
12-Feb-2021
10-Mar-2021
10-Mar-2021
12-Feb-2021
10-Mar-2021
12-Feb-2021
10-Mar-2021
12-Feb-2021

## 2021-03-10 NOTE — BH INPATIENT PSYCHIATRY PROGRESS NOTE - NSBHFUPINTERVALHXFT_PSY_A_CORE
Per report patient refused medication again last night and also this morning, but came back and asked for AM medication later.  On interview, patient reports she took medication last night, although she did not, and is agreeable to take it moving forward.  Pt remains disorganized and speaks minimally.  Pt offered headphones to listen to music and patient reports, "I can't hear," although she can hear  fine and answer questions.  
Pt compliant with medication and tolerating it well.  No events reported overnight.  Pt initially refuses  services then is agreeable, but is irritable throughout the interview.   reports he has a hard time understanding her at times because she was speaking as if she was upset and "like a child."  Pt denies SI/HI/I/P or AH/VH.  Pt reports eating and sleeping well.  Pt paranoid and delusional, talking about blood work and how she has no blood.
Pt compliant with medication and tolerating it well.  No events reported overnight.  Pt refused blood work again this morning.  Pt reports eating and sleeping well.  Pt denies SI/HI/I/P or AH/VH or paranoia.  Pt asked about her son's on the unit.  Pt reports her son is on the unit and ate breakfast with her.  Asked patient if her son spoke to her, she said, "yes," when asked what he said she said, "nothing."  Pt reported she has 3 sons on the unit, then reports 2 that turned into six.  When asked what that meant, patient got agitated, said she did not want to speak further and walked out of the room.
Pt compliant with medication and tolerating it well.  No events reported overnight.  Pt continues to refuse valproic acid level. Pt irritable upon approach, talking to writer in Mandarin, brought her to  phone and patient continued to be irritable, speaking in Mandarin, then got up and left the room before  was reached on the phone.  Pt then refused to engage further and go to  phone despite several attempts.
Pt compliant with medication and tolerating it well.  No events reported overnight.  Pt calm and cooperative with interview, remains less irritable.  Pt denies SI/HI/I/P or AH/VH or paranoia.  Pt observed responding to internal stimuli.  Pt reports she used to hear AH, a few months ago, but not now.  Pt reports good sleep and appetite.
Pt compliant with medication and tolerating it well.  Per report, patient refused blood work this morning.  On interview, patient becomes irritable when talking about blood work.  Pt reports she has no blood in her body and if she has blood work done she will die.  Pt refusing to do it during her hospitalization.  Pt denies SI/HI/I/P or AH/VH or paranoia.  Pt continues to report that her 3 sons are on the unit.  Pt then talks about a party she had in her house where her son gave her hot soup.  
Pt compliant with medication.  Per report, over the weekend, patient was wandering into a male room saying she was looking for her son.  Pt continues to be guarded on interview and denies all sx.  Pt denies the event over the weekend.  Pt appears internally preoccupied
Pt compliant with medication and tolerating it well.  No events reported overnight.  Pt reports eating and sleeping well.  Pt denies SI/HI/I/P or AH/VH or paranoia.  Pt continues to report she has seen 3 of her son's on the unit as well as her mother and .  Pt reports her son's are in their 30's.  Pt reports they speak with her, but she does not know what they are saying.
Pt compliant with medication last night and this morning.  No events reported overnight.  Pt observed responding to internal stimuli on the unit.  Pt guarded and speaks minimally on interview.  Pt denies SI/HI/I/P or AH/VH or paranoia.  Pt less disorganized and able to answer questions more directly today.  Pt reports good sleep and appetite.  
Pt compliant with medication and tolerating it well.  No events reported overnight.  Pt denies SI/HI/I/P or AH/VH.  Pt reports she slept well last night.  Pt advised of discharge today and patient agreeable and expresses no concerns.  Pt agrees to put on clothing and gather her belongings for discharge.
Pt compliant with medication and tolerating it well.  No events reported overnight.  Pt rambles on in Mandarin prior to  services connected on phone.  When  services are connected, she talks about "humans turning into cake."  The  said patient kept talking about that and was not answering interview questions.  Pt then was able to say it was something she saw on TV that was scary to her.  Pt then able to better answer interview questions to a point.  Pt denies SI/HI/I/P and AH/VH.  Pt reports eating and sleeping well.  Asked patient if she felt ready to go back to her residence this week.  Pt said she was ready to go back to live with her brother and father.  Informed her that she will go back to her residence and can keep in contact with family members.  Pt then rambled on and walked out of the room.
Pt compliant with medication and tolerating it well.  No events reported over the weekend and no reports of patient wandering into male rooms.  Pt more irritable today, but cooperative with interview.  Pt denies SI/HI/I/P or AH/VH.  Pt reports she no longer has AH.  Pt reports eating and sleeping well.  Pt continues to believe she sees her sons on the unit and has been speaking with them.  Pt denies they are in room during interview.  Pt also reports seeing her father on the unit.  Pt told staff, she would do blood work.
Pt compliant with medication and tolerating it well.  No events reported overnight.  Pt denies current SI/HI/I/P, reporting, "I would never hurt myself or anyone."  Pt reports AH this morning, but denies at time of interview.  Pt denies AH were command or distressing.  Pt reports eating and sleeping well.  Pt replies, "okay," about going back to her residence, then is disorganized and talks about going to the flying to the dave then back down to the ground and  unable to clarify.
Pt refused medication last night.  Discussed medication with patient and patient remains agreeable to take and reports she did take the medication.  Pt remains disorganized, but less irritable today.  Pt reports, "I have to die because I have no clothing."  Advised patient staff can obtain her donated clothing.  Pt reports she needs to call her mother for clothing, reports she talked to her here, but does not have her phone number.  Pt denies active SI/HI/I/P or AH/VH.  Pt appears paranoid.  Pt reports eating and sleeping well.
Patient refused to engage in interview, despite several attempts by writer and her primary nurse.  Patient provided minimal eye contact, says “hi” to writer, she is observed pacing the hallway with head down.  When asked to have  and interview patient shakes head no.  Patient is followed up for psychosis, and suspected catatonia. Chart, medications and labs reviewed.  Patient is discussed with nursing staff. No significant overnight issues.  Per nursing no appreciated change in status today. Patient remains disorganized, with ongoing perceptual disturbances. Patient stays most of the day pacing hallway today.  No signs of catatonia.  SI/HI unable to assess. Per nursing no mention of sleep disturbances or appetite concerns.  Per nursing patient took her Depakote for the first time last night, will titrating Depakote 750mg for tonight. Self- care remains fair.  No behavioral issues on unit.  
Pt compliant with medication and tolerating it well.  Per report, patient wandered into a male room again yesterday and had to be redirected.  Pt reports she was going to the bathroom.  Pt denies SI/HI/I/P or AH/VH or paranoia.  Pt reported the last time she had AH was a month ago of a male and female voice she recognized but she does not remember what they were saying.  Pt also reports her  is coming to pick her up and she has 3 sons that she has seen on the unit.
Pt compliant with medication and tolerating it well.  No events reported overnight.  Pt initially cooperative with interview.  Pt denies SI/HI/I/P or AH/VH.  Pt reports eating and sleeping well.  Pt reports paranoia that her father is trying to hurt her.  Pt denies her father is on the unit.  Pt reports she has not spoken to her father in a long time.  Pt then gets angry, speaks loudly in Mandarin, , said she was speaking a different dialect, sergeyese, attempts to get another , patient continues to be angry, then walks out of room, refusing further  services.
Pt compliant with medication and tolerating it well.  No events reported overnight.  Pt more calm and cooperative upon approach, but can be irritable at times.  Pt wearing clothing with improved self care.  Pt continues to be guarded, denies SI/HI/I/P or AH/VH or paranoia.  Pt appears internally preoccupied and is observed talking to herself at times.  Pt reports good sleep and appetite.
Pt compliant with medication and tolerating it well.  No events reported overnight.  Pt more cooperative with interview today, less irritable.  Pt reports she remembers walking out of the interview yesterday but does not remember what she was saying.  Pt denies SI/HI/I/P or AH/VH.  Pt reports she does not hear voices anymore.  Pt continues to believe she has 3 sons on the unit.  Pt reports they talk to her, but she does not remember what they say.  Pt reports good sleep and appetite.
Pt selectively refusing medication over the weekend per report.  On interview, patient remains disorganized,  reports she is mumbling at times.  Pt somewhat irritable.  Pt denies SI/HI/I/P or AH/VH.  Unclear if patient has been showering as she says she has twice since admission, but says it has been months and patient appear disheveled.  Pt reports she will take medication.  Cannot say who her psychiatrist of residence is due to disorganization.
Patient refused to engage in interview, despite several attempts by writer.  Patient provided no eye contact, sat in dayroom with head down.  When asked to have  and interview patient shakes head no.  Patient is followed up for psychosis, and suspected catatonia. Chart, medications and labs reviewed.  Patient is discussed with nursing staff. No significant overnight issues.  Per nursing no appreciated change in status today. Patient remains disorganized, with ongoing perceptual disturbances. SI/HI unable to assess. Catatonia resolving.  No mention of sleep disturbances or appetite concerns.  Per nursing patient is refusing all standing medications. Self- care remains fair.  No behavioral issues on unit.  Patient offers no complaints. 
Pt compliant with medication and tolerating it well.  No events reported overnight.  Pt more irritable today, yelling something in Mandarin, shaking her hand that she does not want to talk and refuses to go to the  phone for interview.  Both SW and writer attempted to speak with patient at different times with no success as she refused.
Patient sitting with others in the dayroom.;  Attempted using a translation device to ask if the patient would use the  phone.  Indicated no.  By gesture indicates sleep and eating are okay and denies AH.  In view of communication challenges would consider use of an in-person  prior to discharge.  Also given the likelihood of non-compliance being an issue would cross to Invega raising dose to 6mg daily and tapering Zyprexa with possible use of RAMOS Sustenna.  No tremor; normal gait; good eye contact; speech  normal amount and volume; good adls; no distress apparent.  
Interviewed with assistance of Mandarin speaking staff who speaks patient's dialect (patient had declined use of telephone interviewing service).  Sleep and appetite are good. Food is okay.    Patient has no pain complaints; patient just had a bowel movement; hears voices but doesn't understand what they are saying; knows it is 2021 but thinks she is in a [place] from [place] and expects to meet her parents here.  Not clear about why she is in the hospital.  Disheveled; poor eye contact;  sits alone in the day room;  continues to show evidence of psychosis with hallucinations and disorganized thinking.   Zyprexa lowered to 20mg at night because of sedation; consider raising Invega to 6mg daily .

## 2021-03-10 NOTE — BH INPATIENT PSYCHIATRY PROGRESS NOTE - NSTXDISORGINTERMD_PSY_ALL_CORE
Psychotropic medication

## 2021-03-10 NOTE — [facility] INPATIENT PSYCHIATRY PROGRESS NOTE - CURRENT MEDICATION
MEDICATIONS  (STANDING):  benztropine 0.5 milliGRAM(s) Oral two times a day  cholecalciferol 4000 Unit(s) Oral two times a day  clonazePAM  Tablet 1 milliGRAM(s) Oral at bedtime  diVALproex  milliGRAM(s) Oral two times a day  OLANZapine 20 milliGRAM(s) Oral at bedtime  paliperidone ER 3 milliGRAM(s) Oral at bedtime    MEDICATIONS  (PRN):  acetaminophen   Tablet .. 650 milliGRAM(s) Oral every 6 hours PRN Mild Pain (1 - 3), Moderate Pain (4 - 6), Severe Pain (7 - 10)  benzocaine 15 mG/menthol 3.6 mG (Sugar-Free) Lozenge 1 Lozenge Oral three times a day PRN sore throat  diphenhydrAMINE 50 milliGRAM(s) Oral every 8 hours PRN Rash and/or Itching, agitation, EPS  diphenhydrAMINE   Injectable 50 milliGRAM(s) IntraMuscular once PRN agitation  haloperidol     Tablet 5 milliGRAM(s) Oral every 8 hours PRN agitation  haloperidol    Injectable 5 milliGRAM(s) IntraMuscular once PRN agitation  LORazepam     Tablet 2 milliGRAM(s) Oral every 6 hours PRN agitation  LORazepam   Injectable 2 milliGRAM(s) IntraMuscular once PRN agitation  
MEDICATIONS  (STANDING):  benztropine 0.5 milliGRAM(s) Oral two times a day  clonazePAM  Tablet 1 milliGRAM(s) Oral at bedtime  diVALproex  milliGRAM(s) Oral two times a day  OLANZapine 15 milliGRAM(s) Oral at bedtime  paliperidone ER 3 milliGRAM(s) Oral at bedtime    MEDICATIONS  (PRN):  acetaminophen   Tablet .. 650 milliGRAM(s) Oral every 6 hours PRN Mild Pain (1 - 3), Moderate Pain (4 - 6), Severe Pain (7 - 10)  benzocaine 15 mG/menthol 3.6 mG (Sugar-Free) Lozenge 1 Lozenge Oral three times a day PRN sore throat  diphenhydrAMINE 50 milliGRAM(s) Oral every 8 hours PRN Rash and/or Itching, agitation, EPS  diphenhydrAMINE   Injectable 50 milliGRAM(s) IntraMuscular once PRN agitation  haloperidol     Tablet 5 milliGRAM(s) Oral every 8 hours PRN agitation  haloperidol    Injectable 5 milliGRAM(s) IntraMuscular once PRN agitation  LORazepam     Tablet 2 milliGRAM(s) Oral every 6 hours PRN agitation  LORazepam   Injectable 2 milliGRAM(s) IntraMuscular once PRN agitation  
MEDICATIONS  (STANDING):  benztropine 0.5 milliGRAM(s) Oral two times a day  clonazePAM  Tablet 1 milliGRAM(s) Oral at bedtime  diVALproex  milliGRAM(s) Oral two times a day  OLANZapine Disintegrating Tablet 10 milliGRAM(s) Oral at bedtime  paliperidone ER 3 milliGRAM(s) Oral at bedtime    MEDICATIONS  (PRN):  acetaminophen   Tablet .. 650 milliGRAM(s) Oral every 6 hours PRN Mild Pain (1 - 3), Moderate Pain (4 - 6), Severe Pain (7 - 10)  benzocaine 15 mG/menthol 3.6 mG (Sugar-Free) Lozenge 1 Lozenge Oral three times a day PRN sore throat  diphenhydrAMINE 50 milliGRAM(s) Oral every 8 hours PRN Rash and/or Itching, agitation, EPS  diphenhydrAMINE   Injectable 50 milliGRAM(s) IntraMuscular once PRN agitation  haloperidol     Tablet 5 milliGRAM(s) Oral every 8 hours PRN agitation  haloperidol    Injectable 5 milliGRAM(s) IntraMuscular once PRN agitation  LORazepam     Tablet 2 milliGRAM(s) Oral every 6 hours PRN agitation  LORazepam   Injectable 2 milliGRAM(s) IntraMuscular once PRN agitation  
MEDICATIONS  (STANDING):  benztropine 0.5 milliGRAM(s) Oral two times a day  clonazePAM  Tablet 1 milliGRAM(s) Oral two times a day  diVALproex  milliGRAM(s) Oral two times a day  OLANZapine 5 milliGRAM(s) Oral daily  OLANZapine Disintegrating Tablet 5 milliGRAM(s) Oral at bedtime  paliperidone ER 3 milliGRAM(s) Oral at bedtime    MEDICATIONS  (PRN):  acetaminophen   Tablet .. 650 milliGRAM(s) Oral every 6 hours PRN Mild Pain (1 - 3), Moderate Pain (4 - 6), Severe Pain (7 - 10)  benzocaine 15 mG/menthol 3.6 mG (Sugar-Free) Lozenge 1 Lozenge Oral three times a day PRN sore throat  diphenhydrAMINE 50 milliGRAM(s) Oral every 8 hours PRN Rash and/or Itching, agitation, EPS  diphenhydrAMINE   Injectable 50 milliGRAM(s) IntraMuscular once PRN agitation  haloperidol     Tablet 5 milliGRAM(s) Oral every 8 hours PRN agitation  haloperidol    Injectable 5 milliGRAM(s) IntraMuscular once PRN agitation  LORazepam     Tablet 2 milliGRAM(s) Oral every 6 hours PRN agitation  LORazepam   Injectable 2 milliGRAM(s) IntraMuscular once PRN agitation  
MEDICATIONS  (STANDING):  benztropine 0.5 milliGRAM(s) Oral two times a day  cholecalciferol 4000 Unit(s) Oral two times a day  clonazePAM  Tablet 0.5 milliGRAM(s) Oral daily  clonazePAM  Tablet 1 milliGRAM(s) Oral at bedtime  diVALproex  milliGRAM(s) Oral two times a day  OLANZapine 20 milliGRAM(s) Oral at bedtime  paliperidone ER 6 milliGRAM(s) Oral at bedtime    MEDICATIONS  (PRN):  acetaminophen   Tablet .. 650 milliGRAM(s) Oral every 6 hours PRN Mild Pain (1 - 3), Moderate Pain (4 - 6), Severe Pain (7 - 10)  benzocaine 15 mG/menthol 3.6 mG (Sugar-Free) Lozenge 1 Lozenge Oral three times a day PRN sore throat  diphenhydrAMINE 50 milliGRAM(s) Oral every 8 hours PRN Rash and/or Itching, agitation, EPS  diphenhydrAMINE   Injectable 50 milliGRAM(s) IntraMuscular once PRN agitation  haloperidol     Tablet 5 milliGRAM(s) Oral every 8 hours PRN agitation  haloperidol    Injectable 5 milliGRAM(s) IntraMuscular once PRN agitation  LORazepam     Tablet 2 milliGRAM(s) Oral every 6 hours PRN agitation  LORazepam   Injectable 2 milliGRAM(s) IntraMuscular once PRN agitation  
MEDICATIONS  (STANDING):  benztropine 0.5 milliGRAM(s) Oral two times a day  cholecalciferol 4000 Unit(s) Oral two times a day  clonazePAM  Tablet 0.5 milliGRAM(s) Oral daily  clonazePAM  Tablet 1 milliGRAM(s) Oral at bedtime  diVALproex  milliGRAM(s) Oral two times a day  OLANZapine 20 milliGRAM(s) Oral at bedtime  paliperidone ER 3 milliGRAM(s) Oral at bedtime    MEDICATIONS  (PRN):  acetaminophen   Tablet .. 650 milliGRAM(s) Oral every 6 hours PRN Mild Pain (1 - 3), Moderate Pain (4 - 6), Severe Pain (7 - 10)  benzocaine 15 mG/menthol 3.6 mG (Sugar-Free) Lozenge 1 Lozenge Oral three times a day PRN sore throat  diphenhydrAMINE 50 milliGRAM(s) Oral every 8 hours PRN Rash and/or Itching, agitation, EPS  diphenhydrAMINE   Injectable 50 milliGRAM(s) IntraMuscular once PRN agitation  haloperidol     Tablet 5 milliGRAM(s) Oral every 8 hours PRN agitation  haloperidol    Injectable 5 milliGRAM(s) IntraMuscular once PRN agitation  LORazepam     Tablet 2 milliGRAM(s) Oral every 6 hours PRN agitation  LORazepam   Injectable 2 milliGRAM(s) IntraMuscular once PRN agitation  
MEDICATIONS  (STANDING):  benztropine 0.5 milliGRAM(s) Oral two times a day  cholecalciferol 4000 Unit(s) Oral two times a day  clonazePAM  Tablet 1 milliGRAM(s) Oral at bedtime  diVALproex  milliGRAM(s) Oral two times a day  OLANZapine 25 milliGRAM(s) Oral at bedtime  paliperidone ER 3 milliGRAM(s) Oral at bedtime    MEDICATIONS  (PRN):  acetaminophen   Tablet .. 650 milliGRAM(s) Oral every 6 hours PRN Mild Pain (1 - 3), Moderate Pain (4 - 6), Severe Pain (7 - 10)  benzocaine 15 mG/menthol 3.6 mG (Sugar-Free) Lozenge 1 Lozenge Oral three times a day PRN sore throat  diphenhydrAMINE 50 milliGRAM(s) Oral every 8 hours PRN Rash and/or Itching, agitation, EPS  diphenhydrAMINE   Injectable 50 milliGRAM(s) IntraMuscular once PRN agitation  haloperidol     Tablet 5 milliGRAM(s) Oral every 8 hours PRN agitation  haloperidol    Injectable 5 milliGRAM(s) IntraMuscular once PRN agitation  LORazepam     Tablet 2 milliGRAM(s) Oral every 6 hours PRN agitation  LORazepam   Injectable 2 milliGRAM(s) IntraMuscular once PRN agitation  
MEDICATIONS  (STANDING):  benztropine 0.5 milliGRAM(s) Oral two times a day  clonazePAM  Tablet 1 milliGRAM(s) Oral two times a day  diVALproex  milliGRAM(s) Oral two times a day  OLANZapine 5 milliGRAM(s) Oral daily  OLANZapine Disintegrating Tablet 5 milliGRAM(s) Oral at bedtime  paliperidone ER 3 milliGRAM(s) Oral at bedtime    MEDICATIONS  (PRN):  acetaminophen   Tablet .. 650 milliGRAM(s) Oral every 6 hours PRN Mild Pain (1 - 3), Moderate Pain (4 - 6), Severe Pain (7 - 10)  diphenhydrAMINE 50 milliGRAM(s) Oral every 8 hours PRN Rash and/or Itching, agitation, EPS  diphenhydrAMINE   Injectable 50 milliGRAM(s) IntraMuscular once PRN agitation  haloperidol     Tablet 5 milliGRAM(s) Oral every 8 hours PRN agitation  haloperidol    Injectable 5 milliGRAM(s) IntraMuscular once PRN agitation  LORazepam     Tablet 2 milliGRAM(s) Oral every 6 hours PRN agitation  LORazepam   Injectable 2 milliGRAM(s) IntraMuscular once PRN agitation  
MEDICATIONS  (STANDING):  benztropine 0.5 milliGRAM(s) Oral two times a day  clonazePAM  Tablet 1 milliGRAM(s) Oral at bedtime  diVALproex  milliGRAM(s) Oral at bedtime  OLANZapine Disintegrating Tablet 10 milliGRAM(s) Oral at bedtime  paliperidone ER 3 milliGRAM(s) Oral at bedtime    MEDICATIONS  (PRN):  acetaminophen   Tablet .. 650 milliGRAM(s) Oral every 6 hours PRN Mild Pain (1 - 3), Moderate Pain (4 - 6), Severe Pain (7 - 10)  diphenhydrAMINE 50 milliGRAM(s) Oral every 8 hours PRN Rash and/or Itching, agitation, EPS  diphenhydrAMINE   Injectable 50 milliGRAM(s) IntraMuscular once PRN agitation  haloperidol     Tablet 5 milliGRAM(s) Oral every 8 hours PRN agitation  haloperidol    Injectable 5 milliGRAM(s) IntraMuscular once PRN agitation  LORazepam     Tablet 2 milliGRAM(s) Oral every 8 hours PRN agitation  LORazepam   Injectable 2 milliGRAM(s) IntraMuscular once PRN agitation  
MEDICATIONS  (STANDING):  benztropine 0.5 milliGRAM(s) Oral two times a day  cholecalciferol 4000 Unit(s) Oral two times a day  clonazePAM  Tablet 1 milliGRAM(s) Oral at bedtime  diVALproex  milliGRAM(s) Oral two times a day  OLANZapine 25 milliGRAM(s) Oral at bedtime  paliperidone ER 3 milliGRAM(s) Oral at bedtime    MEDICATIONS  (PRN):  acetaminophen   Tablet .. 650 milliGRAM(s) Oral every 6 hours PRN Mild Pain (1 - 3), Moderate Pain (4 - 6), Severe Pain (7 - 10)  benzocaine 15 mG/menthol 3.6 mG (Sugar-Free) Lozenge 1 Lozenge Oral three times a day PRN sore throat  diphenhydrAMINE 50 milliGRAM(s) Oral every 8 hours PRN Rash and/or Itching, agitation, EPS  diphenhydrAMINE   Injectable 50 milliGRAM(s) IntraMuscular once PRN agitation  haloperidol     Tablet 5 milliGRAM(s) Oral every 8 hours PRN agitation  haloperidol    Injectable 5 milliGRAM(s) IntraMuscular once PRN agitation  LORazepam     Tablet 2 milliGRAM(s) Oral every 6 hours PRN agitation  LORazepam   Injectable 2 milliGRAM(s) IntraMuscular once PRN agitation  
MEDICATIONS  (STANDING):  benztropine 0.5 milliGRAM(s) Oral two times a day  clonazePAM  Tablet 1 milliGRAM(s) Oral at bedtime  diVALproex  milliGRAM(s) Oral two times a day  OLANZapine 15 milliGRAM(s) Oral at bedtime  paliperidone ER 3 milliGRAM(s) Oral at bedtime    MEDICATIONS  (PRN):  acetaminophen   Tablet .. 650 milliGRAM(s) Oral every 6 hours PRN Mild Pain (1 - 3), Moderate Pain (4 - 6), Severe Pain (7 - 10)  benzocaine 15 mG/menthol 3.6 mG (Sugar-Free) Lozenge 1 Lozenge Oral three times a day PRN sore throat  diphenhydrAMINE 50 milliGRAM(s) Oral every 8 hours PRN Rash and/or Itching, agitation, EPS  diphenhydrAMINE   Injectable 50 milliGRAM(s) IntraMuscular once PRN agitation  haloperidol     Tablet 5 milliGRAM(s) Oral every 8 hours PRN agitation  haloperidol    Injectable 5 milliGRAM(s) IntraMuscular once PRN agitation  LORazepam     Tablet 2 milliGRAM(s) Oral every 6 hours PRN agitation  LORazepam   Injectable 2 milliGRAM(s) IntraMuscular once PRN agitation  
MEDICATIONS  (STANDING):  benztropine 0.5 milliGRAM(s) Oral two times a day  clonazePAM  Tablet 1 milliGRAM(s) Oral two times a day  diVALproex  milliGRAM(s) Oral two times a day  OLANZapine 5 milliGRAM(s) Oral daily  OLANZapine Disintegrating Tablet 5 milliGRAM(s) Oral at bedtime  paliperidone ER 3 milliGRAM(s) Oral at bedtime    MEDICATIONS  (PRN):  acetaminophen   Tablet .. 650 milliGRAM(s) Oral every 6 hours PRN Mild Pain (1 - 3), Moderate Pain (4 - 6), Severe Pain (7 - 10)  diphenhydrAMINE 50 milliGRAM(s) Oral every 8 hours PRN Rash and/or Itching, agitation, EPS  diphenhydrAMINE   Injectable 50 milliGRAM(s) IntraMuscular once PRN agitation  haloperidol     Tablet 5 milliGRAM(s) Oral every 8 hours PRN agitation  haloperidol    Injectable 5 milliGRAM(s) IntraMuscular once PRN agitation  LORazepam     Tablet 2 milliGRAM(s) Oral every 6 hours PRN agitation  LORazepam   Injectable 2 milliGRAM(s) IntraMuscular once PRN agitation  
MEDICATIONS  (STANDING):  benztropine 0.5 milliGRAM(s) Oral two times a day  cholecalciferol 4000 Unit(s) Oral two times a day  clonazePAM  Tablet 0.5 milliGRAM(s) Oral daily  clonazePAM  Tablet 1 milliGRAM(s) Oral at bedtime  diVALproex  milliGRAM(s) Oral two times a day  OLANZapine 20 milliGRAM(s) Oral at bedtime  paliperidone ER 3 milliGRAM(s) Oral at bedtime    MEDICATIONS  (PRN):  acetaminophen   Tablet .. 650 milliGRAM(s) Oral every 6 hours PRN Mild Pain (1 - 3), Moderate Pain (4 - 6), Severe Pain (7 - 10)  benzocaine 15 mG/menthol 3.6 mG (Sugar-Free) Lozenge 1 Lozenge Oral three times a day PRN sore throat  diphenhydrAMINE 50 milliGRAM(s) Oral every 8 hours PRN Rash and/or Itching, agitation, EPS  diphenhydrAMINE   Injectable 50 milliGRAM(s) IntraMuscular once PRN agitation  haloperidol     Tablet 5 milliGRAM(s) Oral every 8 hours PRN agitation  haloperidol    Injectable 5 milliGRAM(s) IntraMuscular once PRN agitation  LORazepam     Tablet 2 milliGRAM(s) Oral every 6 hours PRN agitation  LORazepam   Injectable 2 milliGRAM(s) IntraMuscular once PRN agitation  
MEDICATIONS  (STANDING):  benztropine 0.5 milliGRAM(s) Oral two times a day  cholecalciferol 4000 Unit(s) Oral two times a day  clonazePAM  Tablet 0.5 milliGRAM(s) Oral daily  clonazePAM  Tablet 1 milliGRAM(s) Oral at bedtime  diVALproex  milliGRAM(s) Oral two times a day  OLANZapine 20 milliGRAM(s) Oral at bedtime  paliperidone ER 3 milliGRAM(s) Oral at bedtime    MEDICATIONS  (PRN):  acetaminophen   Tablet .. 650 milliGRAM(s) Oral every 6 hours PRN Mild Pain (1 - 3), Moderate Pain (4 - 6), Severe Pain (7 - 10)  benzocaine 15 mG/menthol 3.6 mG (Sugar-Free) Lozenge 1 Lozenge Oral three times a day PRN sore throat  diphenhydrAMINE 50 milliGRAM(s) Oral every 8 hours PRN Rash and/or Itching, agitation, EPS  diphenhydrAMINE   Injectable 50 milliGRAM(s) IntraMuscular once PRN agitation  haloperidol     Tablet 5 milliGRAM(s) Oral every 8 hours PRN agitation  haloperidol    Injectable 5 milliGRAM(s) IntraMuscular once PRN agitation  LORazepam     Tablet 2 milliGRAM(s) Oral every 6 hours PRN agitation  LORazepam   Injectable 2 milliGRAM(s) IntraMuscular once PRN agitation  
MEDICATIONS  (STANDING):  benztropine 0.5 milliGRAM(s) Oral two times a day  clonazePAM  Tablet 1 milliGRAM(s) Oral at bedtime  diVALproex  milliGRAM(s) Oral at bedtime  OLANZapine Disintegrating Tablet 10 milliGRAM(s) Oral at bedtime  paliperidone ER 3 milliGRAM(s) Oral at bedtime    MEDICATIONS  (PRN):  acetaminophen   Tablet .. 650 milliGRAM(s) Oral every 6 hours PRN Mild Pain (1 - 3), Moderate Pain (4 - 6), Severe Pain (7 - 10)  diphenhydrAMINE 50 milliGRAM(s) Oral every 8 hours PRN Rash and/or Itching, agitation, EPS  diphenhydrAMINE   Injectable 50 milliGRAM(s) IntraMuscular once PRN agitation  haloperidol     Tablet 5 milliGRAM(s) Oral every 8 hours PRN agitation  haloperidol    Injectable 5 milliGRAM(s) IntraMuscular once PRN agitation  LORazepam     Tablet 2 milliGRAM(s) Oral every 8 hours PRN agitation  LORazepam   Injectable 2 milliGRAM(s) IntraMuscular once PRN agitation  
MEDICATIONS  (STANDING):  benztropine 0.5 milliGRAM(s) Oral two times a day  clonazePAM  Tablet 1 milliGRAM(s) Oral at bedtime  diVALproex  milliGRAM(s) Oral at bedtime  OLANZapine Disintegrating Tablet 10 milliGRAM(s) Oral at bedtime  paliperidone ER 3 milliGRAM(s) Oral at bedtime    MEDICATIONS  (PRN):  acetaminophen   Tablet .. 650 milliGRAM(s) Oral every 6 hours PRN Mild Pain (1 - 3), Moderate Pain (4 - 6), Severe Pain (7 - 10)  diphenhydrAMINE 50 milliGRAM(s) Oral every 8 hours PRN Rash and/or Itching, agitation, EPS  diphenhydrAMINE   Injectable 50 milliGRAM(s) IntraMuscular once PRN agitation  haloperidol     Tablet 5 milliGRAM(s) Oral every 8 hours PRN agitation  haloperidol    Injectable 5 milliGRAM(s) IntraMuscular once PRN agitation  LORazepam     Tablet 2 milliGRAM(s) Oral every 8 hours PRN agitation  LORazepam   Injectable 2 milliGRAM(s) IntraMuscular once PRN agitation  
MEDICATIONS  (STANDING):  benztropine 0.5 milliGRAM(s) Oral two times a day  cholecalciferol 4000 Unit(s) Oral two times a day  clonazePAM  Tablet 0.5 milliGRAM(s) Oral daily  clonazePAM  Tablet 1 milliGRAM(s) Oral at bedtime  diVALproex  milliGRAM(s) Oral two times a day  OLANZapine 25 milliGRAM(s) Oral at bedtime  paliperidone ER 3 milliGRAM(s) Oral at bedtime    MEDICATIONS  (PRN):  acetaminophen   Tablet .. 650 milliGRAM(s) Oral every 6 hours PRN Mild Pain (1 - 3), Moderate Pain (4 - 6), Severe Pain (7 - 10)  benzocaine 15 mG/menthol 3.6 mG (Sugar-Free) Lozenge 1 Lozenge Oral three times a day PRN sore throat  diphenhydrAMINE 50 milliGRAM(s) Oral every 8 hours PRN Rash and/or Itching, agitation, EPS  diphenhydrAMINE   Injectable 50 milliGRAM(s) IntraMuscular once PRN agitation  haloperidol     Tablet 5 milliGRAM(s) Oral every 8 hours PRN agitation  haloperidol    Injectable 5 milliGRAM(s) IntraMuscular once PRN agitation  LORazepam     Tablet 2 milliGRAM(s) Oral every 6 hours PRN agitation  LORazepam   Injectable 2 milliGRAM(s) IntraMuscular once PRN agitation  
MEDICATIONS  (STANDING):  benztropine 0.5 milliGRAM(s) Oral two times a day  cholecalciferol 4000 Unit(s) Oral two times a day  clonazePAM  Tablet 1 milliGRAM(s) Oral at bedtime  clonazePAM  Tablet 0.5 milliGRAM(s) Oral daily  diVALproex  milliGRAM(s) Oral two times a day  OLANZapine 25 milliGRAM(s) Oral at bedtime  paliperidone ER 3 milliGRAM(s) Oral at bedtime    MEDICATIONS  (PRN):  acetaminophen   Tablet .. 650 milliGRAM(s) Oral every 6 hours PRN Mild Pain (1 - 3), Moderate Pain (4 - 6), Severe Pain (7 - 10)  benzocaine 15 mG/menthol 3.6 mG (Sugar-Free) Lozenge 1 Lozenge Oral three times a day PRN sore throat  diphenhydrAMINE 50 milliGRAM(s) Oral every 8 hours PRN Rash and/or Itching, agitation, EPS  diphenhydrAMINE   Injectable 50 milliGRAM(s) IntraMuscular once PRN agitation  haloperidol     Tablet 5 milliGRAM(s) Oral every 8 hours PRN agitation  haloperidol    Injectable 5 milliGRAM(s) IntraMuscular once PRN agitation  LORazepam     Tablet 2 milliGRAM(s) Oral every 6 hours PRN agitation  LORazepam   Injectable 2 milliGRAM(s) IntraMuscular once PRN agitation  
MEDICATIONS  (STANDING):  benztropine 0.5 milliGRAM(s) Oral two times a day  clonazePAM  Tablet 1 milliGRAM(s) Oral at bedtime  diVALproex  milliGRAM(s) Oral at bedtime  OLANZapine Disintegrating Tablet 10 milliGRAM(s) Oral at bedtime  paliperidone ER 3 milliGRAM(s) Oral at bedtime    MEDICATIONS  (PRN):  diphenhydrAMINE 50 milliGRAM(s) Oral every 8 hours PRN Rash and/or Itching, agitation, EPS  diphenhydrAMINE   Injectable 50 milliGRAM(s) IntraMuscular once PRN agitation  haloperidol     Tablet 5 milliGRAM(s) Oral every 8 hours PRN agitation  haloperidol    Injectable 5 milliGRAM(s) IntraMuscular once PRN agitation  LORazepam     Tablet 2 milliGRAM(s) Oral every 8 hours PRN agitation  LORazepam   Injectable 2 milliGRAM(s) IntraMuscular once PRN agitation  
MEDICATIONS  (STANDING):  benztropine 0.5 milliGRAM(s) Oral two times a day  cholecalciferol 4000 Unit(s) Oral two times a day  clonazePAM  Tablet 0.5 milliGRAM(s) Oral daily  clonazePAM  Tablet 1 milliGRAM(s) Oral at bedtime  diVALproex  milliGRAM(s) Oral two times a day  OLANZapine 20 milliGRAM(s) Oral at bedtime  paliperidone ER 3 milliGRAM(s) Oral at bedtime    MEDICATIONS  (PRN):  acetaminophen   Tablet .. 650 milliGRAM(s) Oral every 6 hours PRN Mild Pain (1 - 3), Moderate Pain (4 - 6), Severe Pain (7 - 10)  benzocaine 15 mG/menthol 3.6 mG (Sugar-Free) Lozenge 1 Lozenge Oral three times a day PRN sore throat  diphenhydrAMINE 50 milliGRAM(s) Oral every 8 hours PRN Rash and/or Itching, agitation, EPS  diphenhydrAMINE   Injectable 50 milliGRAM(s) IntraMuscular once PRN agitation  haloperidol     Tablet 5 milliGRAM(s) Oral every 8 hours PRN agitation  haloperidol    Injectable 5 milliGRAM(s) IntraMuscular once PRN agitation  LORazepam     Tablet 2 milliGRAM(s) Oral every 6 hours PRN agitation  LORazepam   Injectable 2 milliGRAM(s) IntraMuscular once PRN agitation  
MEDICATIONS  (STANDING):  benztropine 0.5 milliGRAM(s) Oral two times a day  cholecalciferol 4000 Unit(s) Oral two times a day  clonazePAM  Tablet 1 milliGRAM(s) Oral at bedtime  diVALproex  milliGRAM(s) Oral two times a day  OLANZapine 20 milliGRAM(s) Oral at bedtime  paliperidone ER 3 milliGRAM(s) Oral at bedtime    MEDICATIONS  (PRN):  acetaminophen   Tablet .. 650 milliGRAM(s) Oral every 6 hours PRN Mild Pain (1 - 3), Moderate Pain (4 - 6), Severe Pain (7 - 10)  benzocaine 15 mG/menthol 3.6 mG (Sugar-Free) Lozenge 1 Lozenge Oral three times a day PRN sore throat  diphenhydrAMINE 50 milliGRAM(s) Oral every 8 hours PRN Rash and/or Itching, agitation, EPS  diphenhydrAMINE   Injectable 50 milliGRAM(s) IntraMuscular once PRN agitation  haloperidol     Tablet 5 milliGRAM(s) Oral every 8 hours PRN agitation  haloperidol    Injectable 5 milliGRAM(s) IntraMuscular once PRN agitation  LORazepam     Tablet 2 milliGRAM(s) Oral every 6 hours PRN agitation  LORazepam   Injectable 2 milliGRAM(s) IntraMuscular once PRN agitation  
MEDICATIONS  (STANDING):  benztropine 0.5 milliGRAM(s) Oral two times a day  clonazePAM  Tablet 1 milliGRAM(s) Oral two times a day  diVALproex  milliGRAM(s) Oral two times a day  OLANZapine 5 milliGRAM(s) Oral daily  OLANZapine Disintegrating Tablet 5 milliGRAM(s) Oral at bedtime  paliperidone ER 3 milliGRAM(s) Oral at bedtime    MEDICATIONS  (PRN):  acetaminophen   Tablet .. 650 milliGRAM(s) Oral every 6 hours PRN Mild Pain (1 - 3), Moderate Pain (4 - 6), Severe Pain (7 - 10)  diphenhydrAMINE 50 milliGRAM(s) Oral every 8 hours PRN Rash and/or Itching, agitation, EPS  diphenhydrAMINE   Injectable 50 milliGRAM(s) IntraMuscular once PRN agitation  haloperidol     Tablet 5 milliGRAM(s) Oral every 8 hours PRN agitation  haloperidol    Injectable 5 milliGRAM(s) IntraMuscular once PRN agitation  LORazepam     Tablet 2 milliGRAM(s) Oral every 6 hours PRN agitation  LORazepam   Injectable 2 milliGRAM(s) IntraMuscular once PRN agitation  
MEDICATIONS  (STANDING):  benztropine 0.5 milliGRAM(s) Oral two times a day  cholecalciferol 4000 Unit(s) Oral two times a day  clonazePAM  Tablet 1 milliGRAM(s) Oral at bedtime  diVALproex  milliGRAM(s) Oral two times a day  OLANZapine 20 milliGRAM(s) Oral at bedtime  paliperidone ER 3 milliGRAM(s) Oral at bedtime    MEDICATIONS  (PRN):  acetaminophen   Tablet .. 650 milliGRAM(s) Oral every 6 hours PRN Mild Pain (1 - 3), Moderate Pain (4 - 6), Severe Pain (7 - 10)  benzocaine 15 mG/menthol 3.6 mG (Sugar-Free) Lozenge 1 Lozenge Oral three times a day PRN sore throat  diphenhydrAMINE 50 milliGRAM(s) Oral every 8 hours PRN Rash and/or Itching, agitation, EPS  diphenhydrAMINE   Injectable 50 milliGRAM(s) IntraMuscular once PRN agitation  haloperidol     Tablet 5 milliGRAM(s) Oral every 8 hours PRN agitation  haloperidol    Injectable 5 milliGRAM(s) IntraMuscular once PRN agitation  LORazepam     Tablet 2 milliGRAM(s) Oral every 6 hours PRN agitation  LORazepam   Injectable 2 milliGRAM(s) IntraMuscular once PRN agitation  
MEDICATIONS  (STANDING):  benztropine 0.5 milliGRAM(s) Oral two times a day  cholecalciferol 4000 Unit(s) Oral two times a day  clonazePAM  Tablet 1 milliGRAM(s) Oral at bedtime  diVALproex  milliGRAM(s) Oral two times a day  OLANZapine 20 milliGRAM(s) Oral at bedtime  paliperidone ER 3 milliGRAM(s) Oral at bedtime    MEDICATIONS  (PRN):  acetaminophen   Tablet .. 650 milliGRAM(s) Oral every 6 hours PRN Mild Pain (1 - 3), Moderate Pain (4 - 6), Severe Pain (7 - 10)  benzocaine 15 mG/menthol 3.6 mG (Sugar-Free) Lozenge 1 Lozenge Oral three times a day PRN sore throat  diphenhydrAMINE 50 milliGRAM(s) Oral every 8 hours PRN Rash and/or Itching, agitation, EPS  diphenhydrAMINE   Injectable 50 milliGRAM(s) IntraMuscular once PRN agitation  haloperidol     Tablet 5 milliGRAM(s) Oral every 8 hours PRN agitation  haloperidol    Injectable 5 milliGRAM(s) IntraMuscular once PRN agitation  LORazepam     Tablet 2 milliGRAM(s) Oral every 6 hours PRN agitation  LORazepam   Injectable 2 milliGRAM(s) IntraMuscular once PRN agitation

## 2021-03-10 NOTE — BH INPATIENT PSYCHIATRY PROGRESS NOTE - NSTXDCOPNODATETRGT_PSY_ALL_CORE
12-Feb-2021
12-Feb-2021
19-Feb-2021
12-Mar-2021
05-Mar-2021
19-Feb-2021
12-Feb-2021
05-Mar-2021
12-Mar-2021
12-Feb-2021
05-Mar-2021
12-Feb-2021
12-Mar-2021
19-Feb-2021
05-Mar-2021
05-Mar-2021
12-Feb-2021
05-Mar-2021
19-Feb-2021
12-Feb-2021
19-Feb-2021

## 2021-03-10 NOTE — BH INPATIENT PSYCHIATRY PROGRESS NOTE - NSBHCONSULTIPREASON_PSY_A_CORE
danger to self; mental illness expected to respond to inpatient care
danger to self; mental illness expected to respond to inpatient care
other reason
danger to self; mental illness expected to respond to inpatient care

## 2021-03-10 NOTE — BH INPATIENT PSYCHIATRY PROGRESS NOTE - NSTXDISORGGOAL_PSY_ALL_CORE
Will demonstrate related thoughts for 5 min in conversation
Will demonstrate purposeful and predictable thoughts/behaviors by making a request
Will demonstrate purposeful and predictable thoughts/behaviors by making a request
Will demonstrate related thoughts for 5 min in conversation
Will demonstrate purposeful and predictable thoughts/behaviors by making a request
Will demonstrate the ability to maintain reality orientation and communicate clearly with others during 2 conversations with staff daily
Will demonstrate related thoughts for 5 min in conversation
Will demonstrate related thoughts for 5 min in conversation
Will demonstrate purposeful and predictable thoughts/behaviors by making a request
Will demonstrate the ability to maintain reality orientation and communicate clearly with others during 2 conversations with staff daily
Will demonstrate purposeful and predictable thoughts/behaviors by making a request
Will demonstrate related thoughts for 5 min in conversation
Will demonstrate the ability to maintain reality orientation and communicate clearly with others during 2 conversations with staff daily
Will demonstrate purposeful and predictable thoughts/behaviors by making a request
Will demonstrate the ability to maintain reality orientation and communicate clearly with others during 2 conversations with staff daily

## 2021-03-10 NOTE — BH INPATIENT PSYCHIATRY PROGRESS NOTE - NSTXPROBMEDIC_PSY_ALL_CORE
MEDICATION/TREATMENT NON-COMPLIANCE

## 2021-03-10 NOTE — BH INPATIENT PSYCHIATRY PROGRESS NOTE - NSBHCONSBHPROVCNTCTNOFT_PSY_A_CORE
Patient refusing consent

## 2021-03-10 NOTE — BH INPATIENT PSYCHIATRY PROGRESS NOTE - NSBHMSEAFFCONG_PSY_A_CORE
Non-congruent
Congruent
Non-congruent
Congruent
Non-congruent
Congruent
Unable to assess
Congruent
Non-congruent
Congruent
Congruent
Unable to assess
Congruent
Non-congruent

## 2021-03-10 NOTE — BH INPATIENT PSYCHIATRY PROGRESS NOTE - NSTXDCOPNOINTERMD_PSY_ALL_CORE
SW to coordinate

## 2021-03-10 NOTE — BH INPATIENT PSYCHIATRY PROGRESS NOTE - NSBHMSEGAIT_PSY_A_CORE
Normal gait / station

## 2021-03-10 NOTE — BH INPATIENT PSYCHIATRY PROGRESS NOTE - NSTXMEDICPROGRES_PSY_ALL_CORE
No Change
Met - goal discontinued
Met - goal discontinued
No Change
Met - goal discontinued
No Change
Met - goal discontinued
Improving
No Change
Improving
Met - goal discontinued
Met - goal discontinued
Improving
Improving
Met - goal discontinued
No Change
Met - goal discontinued

## 2021-03-10 NOTE — BH INPATIENT PSYCHIATRY PROGRESS NOTE - NSBHMSEATTEN_PSY_A_CORE
Normal
Unable to assess
Normal
Unable to assess
Normal
Normal
Unable to assess
Normal
Normal
Unable to assess
Normal
Unable to assess
Normal
Normal

## 2021-03-10 NOTE — BH INPATIENT PSYCHIATRY PROGRESS NOTE - NSTXPSYCHOGOAL_PSY_ALL_CORE
Will be able to report experiencing hallucinations to staff
Will be able to report experiencing hallucinations to staff
Will ask for PRN medication to manage hallucinations
Will ask for PRN medication to manage hallucinations
Will be able to report experiencing hallucinations to staff
Will ask for PRN medication to manage hallucinations
Will be able to report experiencing hallucinations to staff
Will ask for PRN medication to manage hallucinations
Will be able to report experiencing hallucinations to staff
Will ask for PRN medication to manage hallucinations
Will be able to report experiencing hallucinations to staff
Will ask for PRN medication to manage hallucinations
Will be able to report experiencing hallucinations to staff
Will ask for PRN medication to manage hallucinations
Will be able to report experiencing hallucinations to staff

## 2021-03-10 NOTE — BH INPATIENT PSYCHIATRY PROGRESS NOTE - NSBHMSEBODY_PSY_A_CORE
Well nourished

## 2021-03-10 NOTE — BH INPATIENT PSYCHIATRY PROGRESS NOTE - NSBHMSEGROOM_PSY_A_CORE
Poor
Poor
Fair
Poor
Poor
Fair
Fair
Poor
Fair
Poor
Poor
Fair
Poor
Fair
Poor
Fair
Fair
Poor
Poor
Fair
Poor
Poor

## 2021-03-10 NOTE — BH INPATIENT PSYCHIATRY PROGRESS NOTE - NSTXDISORGDATEEST_PSY_ALL_CORE
05-Feb-2021
03-Mar-2021
03-Mar-2021
05-Feb-2021
05-Feb-2021
03-Mar-2021
05-Feb-2021
05-Feb-2021
03-Mar-2021
05-Feb-2021
03-Mar-2021
05-Feb-2021
05-Feb-2021
03-Mar-2021
05-Feb-2021

## 2021-03-10 NOTE — BH INPATIENT PSYCHIATRY PROGRESS NOTE - GENERAL APPEARANCE
Well developed

## 2021-03-10 NOTE — BH INPATIENT PSYCHIATRY PROGRESS NOTE - NSBHPSYCHOLCOGORIENT_PSY_A_CORE
Oriented to time, place, person, situation
Unable to assess
Unable to assess
Oriented to time, place, person, situation
Unable to assess
Oriented to time, place, person, situation

## 2021-03-10 NOTE — BH INPATIENT PSYCHIATRY PROGRESS NOTE - NSBHMSEINTELL_PSY_A_CORE
Unable to assess

## 2021-03-10 NOTE — BH INPATIENT PSYCHIATRY PROGRESS NOTE - NSTXDCOPNOPROGRES_PSY_ALL_CORE
No Change
No Change
Improving
Met - goal discontinued
Improving
No Change
No Change
Improving
No Change
Improving
No Change
No Change
Improving
Improving

## 2021-03-10 NOTE — BH INPATIENT PSYCHIATRY PROGRESS NOTE - NSTXPSYCHOPROGRES_PSY_ALL_CORE
No Change
Improving
No Change
Improving
Met - goal discontinued
Improving
Improving
Met - goal discontinued
No Change
Improving
Improving
No Change
Improving
No Change
Improving
Improving
No Change
No Change
Improving
No Change
No Change
Improving
No Change
Improving

## 2021-03-10 NOTE — BH INPATIENT PSYCHIATRY PROGRESS NOTE - NSTXDISORGPROGRES_PSY_ALL_CORE
No Change
No Change
Improving
No Change
Improving
No Change
Improving
Improving
No Change
Improving
Improving
No Change
Improving

## 2021-03-10 NOTE — BH INPATIENT PSYCHIATRY PROGRESS NOTE - NSTXDCOPNOGOAL_PSY_ALL_CORE
Other...
Other...
Will agree to participate in appropriate outpatient care
Other...
Will agree to participate in appropriate outpatient care
Other...
Will agree to participate in appropriate outpatient care
Other...
Will agree to participate in appropriate outpatient care
Other...
Will agree to participate in appropriate outpatient care
Will agree to participate in appropriate outpatient care

## 2021-03-10 NOTE — BH INPATIENT PSYCHIATRY PROGRESS NOTE - NSBHMSEEYE_PSY_A_CORE
Poor
Fair
Poor

## 2021-03-10 NOTE — BH INPATIENT PSYCHIATRY PROGRESS NOTE - NSBHATTESTSEENBY_PSY_A_CORE
NP without Attending Psychiatrist
attending Psychiatrist without NP/Trainee
attending Psychiatrist without NP/Trainee
NP without Attending Psychiatrist

## 2021-03-10 NOTE — BH INPATIENT PSYCHIATRY PROGRESS NOTE - NSTXPSYCHOINTERMD_PSY_ALL_CORE
Psychotropic medication

## 2021-03-10 NOTE — BH INPATIENT PSYCHIATRY PROGRESS NOTE - PRN MEDS
MEDICATIONS  (PRN):  acetaminophen   Tablet .. 650 milliGRAM(s) Oral every 6 hours PRN Mild Pain (1 - 3), Moderate Pain (4 - 6), Severe Pain (7 - 10)  benzocaine 15 mG/menthol 3.6 mG (Sugar-Free) Lozenge 1 Lozenge Oral three times a day PRN sore throat  diphenhydrAMINE 50 milliGRAM(s) Oral every 8 hours PRN Rash and/or Itching, agitation, EPS  diphenhydrAMINE   Injectable 50 milliGRAM(s) IntraMuscular once PRN agitation  haloperidol     Tablet 5 milliGRAM(s) Oral every 8 hours PRN agitation  haloperidol    Injectable 5 milliGRAM(s) IntraMuscular once PRN agitation  LORazepam     Tablet 2 milliGRAM(s) Oral every 6 hours PRN agitation  LORazepam   Injectable 2 milliGRAM(s) IntraMuscular once PRN agitation  
MEDICATIONS  (PRN):  acetaminophen   Tablet .. 650 milliGRAM(s) Oral every 6 hours PRN Mild Pain (1 - 3), Moderate Pain (4 - 6), Severe Pain (7 - 10)  diphenhydrAMINE 50 milliGRAM(s) Oral every 8 hours PRN Rash and/or Itching, agitation, EPS  diphenhydrAMINE   Injectable 50 milliGRAM(s) IntraMuscular once PRN agitation  haloperidol     Tablet 5 milliGRAM(s) Oral every 8 hours PRN agitation  haloperidol    Injectable 5 milliGRAM(s) IntraMuscular once PRN agitation  LORazepam     Tablet 2 milliGRAM(s) Oral every 6 hours PRN agitation  LORazepam   Injectable 2 milliGRAM(s) IntraMuscular once PRN agitation  
MEDICATIONS  (PRN):  acetaminophen   Tablet .. 650 milliGRAM(s) Oral every 6 hours PRN Mild Pain (1 - 3), Moderate Pain (4 - 6), Severe Pain (7 - 10)  benzocaine 15 mG/menthol 3.6 mG (Sugar-Free) Lozenge 1 Lozenge Oral three times a day PRN sore throat  diphenhydrAMINE 50 milliGRAM(s) Oral every 8 hours PRN Rash and/or Itching, agitation, EPS  diphenhydrAMINE   Injectable 50 milliGRAM(s) IntraMuscular once PRN agitation  haloperidol     Tablet 5 milliGRAM(s) Oral every 8 hours PRN agitation  haloperidol    Injectable 5 milliGRAM(s) IntraMuscular once PRN agitation  LORazepam     Tablet 2 milliGRAM(s) Oral every 6 hours PRN agitation  LORazepam   Injectable 2 milliGRAM(s) IntraMuscular once PRN agitation  
MEDICATIONS  (PRN):  acetaminophen   Tablet .. 650 milliGRAM(s) Oral every 6 hours PRN Mild Pain (1 - 3), Moderate Pain (4 - 6), Severe Pain (7 - 10)  diphenhydrAMINE 50 milliGRAM(s) Oral every 8 hours PRN Rash and/or Itching, agitation, EPS  diphenhydrAMINE   Injectable 50 milliGRAM(s) IntraMuscular once PRN agitation  haloperidol     Tablet 5 milliGRAM(s) Oral every 8 hours PRN agitation  haloperidol    Injectable 5 milliGRAM(s) IntraMuscular once PRN agitation  LORazepam     Tablet 2 milliGRAM(s) Oral every 6 hours PRN agitation  LORazepam   Injectable 2 milliGRAM(s) IntraMuscular once PRN agitation  
MEDICATIONS  (PRN):  acetaminophen   Tablet .. 650 milliGRAM(s) Oral every 6 hours PRN Mild Pain (1 - 3), Moderate Pain (4 - 6), Severe Pain (7 - 10)  benzocaine 15 mG/menthol 3.6 mG (Sugar-Free) Lozenge 1 Lozenge Oral three times a day PRN sore throat  diphenhydrAMINE 50 milliGRAM(s) Oral every 8 hours PRN Rash and/or Itching, agitation, EPS  diphenhydrAMINE   Injectable 50 milliGRAM(s) IntraMuscular once PRN agitation  haloperidol     Tablet 5 milliGRAM(s) Oral every 8 hours PRN agitation  haloperidol    Injectable 5 milliGRAM(s) IntraMuscular once PRN agitation  LORazepam     Tablet 2 milliGRAM(s) Oral every 6 hours PRN agitation  LORazepam   Injectable 2 milliGRAM(s) IntraMuscular once PRN agitation  
MEDICATIONS  (PRN):  diphenhydrAMINE 50 milliGRAM(s) Oral every 8 hours PRN Rash and/or Itching, agitation, EPS  diphenhydrAMINE   Injectable 50 milliGRAM(s) IntraMuscular once PRN agitation  haloperidol     Tablet 5 milliGRAM(s) Oral every 8 hours PRN agitation  haloperidol    Injectable 5 milliGRAM(s) IntraMuscular once PRN agitation  LORazepam     Tablet 2 milliGRAM(s) Oral every 8 hours PRN agitation  LORazepam   Injectable 2 milliGRAM(s) IntraMuscular once PRN agitation  
MEDICATIONS  (PRN):  acetaminophen   Tablet .. 650 milliGRAM(s) Oral every 6 hours PRN Mild Pain (1 - 3), Moderate Pain (4 - 6), Severe Pain (7 - 10)  diphenhydrAMINE 50 milliGRAM(s) Oral every 8 hours PRN Rash and/or Itching, agitation, EPS  diphenhydrAMINE   Injectable 50 milliGRAM(s) IntraMuscular once PRN agitation  haloperidol     Tablet 5 milliGRAM(s) Oral every 8 hours PRN agitation  haloperidol    Injectable 5 milliGRAM(s) IntraMuscular once PRN agitation  LORazepam     Tablet 2 milliGRAM(s) Oral every 6 hours PRN agitation  LORazepam   Injectable 2 milliGRAM(s) IntraMuscular once PRN agitation  
MEDICATIONS  (PRN):  acetaminophen   Tablet .. 650 milliGRAM(s) Oral every 6 hours PRN Mild Pain (1 - 3), Moderate Pain (4 - 6), Severe Pain (7 - 10)  benzocaine 15 mG/menthol 3.6 mG (Sugar-Free) Lozenge 1 Lozenge Oral three times a day PRN sore throat  diphenhydrAMINE 50 milliGRAM(s) Oral every 8 hours PRN Rash and/or Itching, agitation, EPS  diphenhydrAMINE   Injectable 50 milliGRAM(s) IntraMuscular once PRN agitation  haloperidol     Tablet 5 milliGRAM(s) Oral every 8 hours PRN agitation  haloperidol    Injectable 5 milliGRAM(s) IntraMuscular once PRN agitation  LORazepam     Tablet 2 milliGRAM(s) Oral every 6 hours PRN agitation  LORazepam   Injectable 2 milliGRAM(s) IntraMuscular once PRN agitation  
MEDICATIONS  (PRN):  acetaminophen   Tablet .. 650 milliGRAM(s) Oral every 6 hours PRN Mild Pain (1 - 3), Moderate Pain (4 - 6), Severe Pain (7 - 10)  diphenhydrAMINE 50 milliGRAM(s) Oral every 8 hours PRN Rash and/or Itching, agitation, EPS  diphenhydrAMINE   Injectable 50 milliGRAM(s) IntraMuscular once PRN agitation  haloperidol     Tablet 5 milliGRAM(s) Oral every 8 hours PRN agitation  haloperidol    Injectable 5 milliGRAM(s) IntraMuscular once PRN agitation  LORazepam     Tablet 2 milliGRAM(s) Oral every 8 hours PRN agitation  LORazepam   Injectable 2 milliGRAM(s) IntraMuscular once PRN agitation  
MEDICATIONS  (PRN):  acetaminophen   Tablet .. 650 milliGRAM(s) Oral every 6 hours PRN Mild Pain (1 - 3), Moderate Pain (4 - 6), Severe Pain (7 - 10)  benzocaine 15 mG/menthol 3.6 mG (Sugar-Free) Lozenge 1 Lozenge Oral three times a day PRN sore throat  diphenhydrAMINE 50 milliGRAM(s) Oral every 8 hours PRN Rash and/or Itching, agitation, EPS  diphenhydrAMINE   Injectable 50 milliGRAM(s) IntraMuscular once PRN agitation  haloperidol     Tablet 5 milliGRAM(s) Oral every 8 hours PRN agitation  haloperidol    Injectable 5 milliGRAM(s) IntraMuscular once PRN agitation  LORazepam     Tablet 2 milliGRAM(s) Oral every 6 hours PRN agitation  LORazepam   Injectable 2 milliGRAM(s) IntraMuscular once PRN agitation  
MEDICATIONS  (PRN):  acetaminophen   Tablet .. 650 milliGRAM(s) Oral every 6 hours PRN Mild Pain (1 - 3), Moderate Pain (4 - 6), Severe Pain (7 - 10)  benzocaine 15 mG/menthol 3.6 mG (Sugar-Free) Lozenge 1 Lozenge Oral three times a day PRN sore throat  diphenhydrAMINE 50 milliGRAM(s) Oral every 8 hours PRN Rash and/or Itching, agitation, EPS  diphenhydrAMINE   Injectable 50 milliGRAM(s) IntraMuscular once PRN agitation  haloperidol     Tablet 5 milliGRAM(s) Oral every 8 hours PRN agitation  haloperidol    Injectable 5 milliGRAM(s) IntraMuscular once PRN agitation  LORazepam     Tablet 2 milliGRAM(s) Oral every 6 hours PRN agitation  LORazepam   Injectable 2 milliGRAM(s) IntraMuscular once PRN agitation  
MEDICATIONS  (PRN):  acetaminophen   Tablet .. 650 milliGRAM(s) Oral every 6 hours PRN Mild Pain (1 - 3), Moderate Pain (4 - 6), Severe Pain (7 - 10)  diphenhydrAMINE 50 milliGRAM(s) Oral every 8 hours PRN Rash and/or Itching, agitation, EPS  diphenhydrAMINE   Injectable 50 milliGRAM(s) IntraMuscular once PRN agitation  haloperidol     Tablet 5 milliGRAM(s) Oral every 8 hours PRN agitation  haloperidol    Injectable 5 milliGRAM(s) IntraMuscular once PRN agitation  LORazepam     Tablet 2 milliGRAM(s) Oral every 8 hours PRN agitation  LORazepam   Injectable 2 milliGRAM(s) IntraMuscular once PRN agitation  
MEDICATIONS  (PRN):  acetaminophen   Tablet .. 650 milliGRAM(s) Oral every 6 hours PRN Mild Pain (1 - 3), Moderate Pain (4 - 6), Severe Pain (7 - 10)  diphenhydrAMINE 50 milliGRAM(s) Oral every 8 hours PRN Rash and/or Itching, agitation, EPS  diphenhydrAMINE   Injectable 50 milliGRAM(s) IntraMuscular once PRN agitation  haloperidol     Tablet 5 milliGRAM(s) Oral every 8 hours PRN agitation  haloperidol    Injectable 5 milliGRAM(s) IntraMuscular once PRN agitation  LORazepam     Tablet 2 milliGRAM(s) Oral every 8 hours PRN agitation  LORazepam   Injectable 2 milliGRAM(s) IntraMuscular once PRN agitation  
MEDICATIONS  (PRN):  acetaminophen   Tablet .. 650 milliGRAM(s) Oral every 6 hours PRN Mild Pain (1 - 3), Moderate Pain (4 - 6), Severe Pain (7 - 10)  benzocaine 15 mG/menthol 3.6 mG (Sugar-Free) Lozenge 1 Lozenge Oral three times a day PRN sore throat  diphenhydrAMINE 50 milliGRAM(s) Oral every 8 hours PRN Rash and/or Itching, agitation, EPS  diphenhydrAMINE   Injectable 50 milliGRAM(s) IntraMuscular once PRN agitation  haloperidol     Tablet 5 milliGRAM(s) Oral every 8 hours PRN agitation  haloperidol    Injectable 5 milliGRAM(s) IntraMuscular once PRN agitation  LORazepam     Tablet 2 milliGRAM(s) Oral every 6 hours PRN agitation  LORazepam   Injectable 2 milliGRAM(s) IntraMuscular once PRN agitation

## 2021-03-10 NOTE — BH INPATIENT PSYCHIATRY PROGRESS NOTE - NSTXDISORGDATETRGT_PSY_ALL_CORE
10-Mar-2021
10-Mar-2021
12-Feb-2021
10-Mar-2021
12-Feb-2021
10-Mar-2021
12-Feb-2021
10-Mar-2021
12-Feb-2021
10-Mar-2021
12-Feb-2021

## 2021-03-10 NOTE — BH INPATIENT PSYCHIATRY PROGRESS NOTE - NSTXPROBDISORG_PSY_ALL_CORE
DISORGANIZATION OF THOUGHT/BEHAVIOR

## 2021-03-10 NOTE — BH INPATIENT PSYCHIATRY PROGRESS NOTE - NSTXMEDICDATETRGT_PSY_ALL_CORE
11-Feb-2021
26-Feb-2021
11-Feb-2021
12-Mar-2021
26-Feb-2021
20-Feb-2021
11-Feb-2021
12-Mar-2021
03-Mar-2021
11-Feb-2021
05-Mar-2021
20-Feb-2021
12-Mar-2021
03-Mar-2021
05-Mar-2021
20-Feb-2021
26-Feb-2021
26-Feb-2021
20-Feb-2021
11-Feb-2021
05-Mar-2021
12-Mar-2021

## 2021-03-10 NOTE — SOCIAL WORK POST DISCHARGE FOLLOW UP NOTE - NSBHSWFOLLOWUP_PSY_ALL_CORE_FT
Suzanne sent d/c summary to ACT team Fax# [phone] attn: Anna ROTHMAN;  Residence attn: Abby fax# [phone], and to [facility] AOT: [email or web address], as she I noted to be remote.     Treatment team felt patient was psychiatrically stable at discharge, familiar with emergency contacts and community supports. Treatment team felt at discharge patient was not a danger to self or others.

## 2021-03-10 NOTE — BH INPATIENT PSYCHIATRY PROGRESS NOTE - NSBHMSEJUDGE_PSY_A_CORE
Poor
Unable to assess
Poor
Poor

## 2021-03-10 NOTE — BH INPATIENT PSYCHIATRY PROGRESS NOTE - NSBHMSELANG_PSY_A_CORE
Unable to assess
No abnormalities noted/Unable to assess
No abnormalities noted/Unable to assess
Unable to assess
No abnormalities noted/Unable to assess
No abnormalities noted/Unable to assess
Unable to assess
No abnormalities noted/Unable to assess
Unable to assess
No abnormalities noted/Unable to assess
Unable to assess
No abnormalities noted/Unable to assess
Unable to assess
No abnormalities noted/Unable to assess
Unable to assess
No abnormalities noted/Unable to assess
Unable to assess

## 2021-03-10 NOTE — BH INPATIENT PSYCHIATRY PROGRESS NOTE - NSBHMSEIMPULSE_PSY_A_CORE
Normal
Normal
Impaired
Impaired
Normal
Impaired
Normal
Impaired
Normal
Impaired
Normal
Impaired
Normal

## 2021-03-23 ENCOUNTER — EMERGENCY (EMERGENCY)
Facility: HOSPITAL | Age: 46
LOS: 1 days | Discharge: ANOTHER HOSPITAL | End: 2021-03-23
Attending: INTERNAL MEDICINE | Admitting: INTERNAL MEDICINE
Payer: MEDICAID

## 2021-03-23 VITALS — HEIGHT: 56 IN

## 2021-03-23 DIAGNOSIS — F25.9 SCHIZOAFFECTIVE DISORDER, UNSPECIFIED: ICD-10-CM

## 2021-03-23 DIAGNOSIS — F79 UNSPECIFIED INTELLECTUAL DISABILITIES: ICD-10-CM

## 2021-03-23 LAB
ANION GAP SERPL CALC-SCNC: 11 MMOL/L — SIGNIFICANT CHANGE UP (ref 5–17)
APAP SERPL-MCNC: <1 UG/ML — LOW (ref 10–30)
BASOPHILS # BLD AUTO: 0.04 K/UL — SIGNIFICANT CHANGE UP (ref 0–0.2)
BASOPHILS NFR BLD AUTO: 0.5 % — SIGNIFICANT CHANGE UP (ref 0–2)
BUN SERPL-MCNC: 12 MG/DL — SIGNIFICANT CHANGE UP (ref 7–23)
CALCIUM SERPL-MCNC: 9.1 MG/DL — SIGNIFICANT CHANGE UP (ref 8.4–10.5)
CHLORIDE SERPL-SCNC: 106 MMOL/L — SIGNIFICANT CHANGE UP (ref 96–108)
CO2 SERPL-SCNC: 21 MMOL/L — LOW (ref 22–31)
CREAT SERPL-MCNC: 0.42 MG/DL — LOW (ref 0.5–1.3)
EOSINOPHIL # BLD AUTO: 0.03 K/UL — SIGNIFICANT CHANGE UP (ref 0–0.5)
EOSINOPHIL NFR BLD AUTO: 0.3 % — SIGNIFICANT CHANGE UP (ref 0–6)
ETHANOL SERPL-MCNC: <3 MG/DL — SIGNIFICANT CHANGE UP (ref 0–3)
GLUCOSE SERPL-MCNC: 101 MG/DL — HIGH (ref 70–99)
HCT VFR BLD CALC: 41 % — SIGNIFICANT CHANGE UP (ref 34.5–45)
HGB BLD-MCNC: 13.1 G/DL — SIGNIFICANT CHANGE UP (ref 11.5–15.5)
IMM GRANULOCYTES NFR BLD AUTO: 0.6 % — SIGNIFICANT CHANGE UP (ref 0–1.5)
LYMPHOCYTES # BLD AUTO: 1.34 K/UL — SIGNIFICANT CHANGE UP (ref 1–3.3)
LYMPHOCYTES # BLD AUTO: 15.2 % — SIGNIFICANT CHANGE UP (ref 13–44)
MCHC RBC-ENTMCNC: 29.1 PG — SIGNIFICANT CHANGE UP (ref 27–34)
MCHC RBC-ENTMCNC: 32 GM/DL — SIGNIFICANT CHANGE UP (ref 32–36)
MCV RBC AUTO: 91.1 FL — SIGNIFICANT CHANGE UP (ref 80–100)
MONOCYTES # BLD AUTO: 0.58 K/UL — SIGNIFICANT CHANGE UP (ref 0–0.9)
MONOCYTES NFR BLD AUTO: 6.6 % — SIGNIFICANT CHANGE UP (ref 2–14)
NEUTROPHILS # BLD AUTO: 6.78 K/UL — SIGNIFICANT CHANGE UP (ref 1.8–7.4)
NEUTROPHILS NFR BLD AUTO: 76.8 % — SIGNIFICANT CHANGE UP (ref 43–77)
NRBC # BLD: 0 /100 WBCS — SIGNIFICANT CHANGE UP (ref 0–0)
PLATELET # BLD AUTO: 229 K/UL — SIGNIFICANT CHANGE UP (ref 150–400)
POTASSIUM SERPL-MCNC: 4.6 MMOL/L — SIGNIFICANT CHANGE UP (ref 3.5–5.3)
POTASSIUM SERPL-SCNC: 4.6 MMOL/L — SIGNIFICANT CHANGE UP (ref 3.5–5.3)
RBC # BLD: 4.5 M/UL — SIGNIFICANT CHANGE UP (ref 3.8–5.2)
RBC # FLD: 13.9 % — SIGNIFICANT CHANGE UP (ref 10.3–14.5)
SALICYLATES SERPL-MCNC: 1.1 MG/DL — LOW (ref 3–30)
SARS-COV-2 RNA SPEC QL NAA+PROBE: SIGNIFICANT CHANGE UP
SODIUM SERPL-SCNC: 138 MMOL/L — SIGNIFICANT CHANGE UP (ref 135–145)
WBC # BLD: 8.82 K/UL — SIGNIFICANT CHANGE UP (ref 3.8–10.5)
WBC # FLD AUTO: 8.82 K/UL — SIGNIFICANT CHANGE UP (ref 3.8–10.5)

## 2021-03-23 PROCEDURE — 71045 X-RAY EXAM CHEST 1 VIEW: CPT | Mod: 26

## 2021-03-23 PROCEDURE — 99285 EMERGENCY DEPT VISIT HI MDM: CPT

## 2021-03-23 PROCEDURE — 90792 PSYCH DIAG EVAL W/MED SRVCS: CPT | Mod: 95

## 2021-03-23 PROCEDURE — 93010 ELECTROCARDIOGRAM REPORT: CPT

## 2021-03-23 RX ORDER — DIPHENHYDRAMINE HCL 50 MG
50 CAPSULE ORAL ONCE
Refills: 0 | Status: DISCONTINUED | OUTPATIENT
Start: 2021-03-23 | End: 2021-03-23

## 2021-03-23 RX ORDER — HALOPERIDOL DECANOATE 100 MG/ML
5 INJECTION INTRAMUSCULAR ONCE
Refills: 0 | Status: DISCONTINUED | OUTPATIENT
Start: 2021-03-23 | End: 2021-03-23

## 2021-03-23 RX ORDER — OLANZAPINE 15 MG/1
20 TABLET, FILM COATED ORAL ONCE
Refills: 0 | Status: COMPLETED | OUTPATIENT
Start: 2021-03-23 | End: 2021-03-23

## 2021-03-23 RX ORDER — CLONAZEPAM 1 MG
1 TABLET ORAL ONCE
Refills: 0 | Status: DISCONTINUED | OUTPATIENT
Start: 2021-03-23 | End: 2021-03-23

## 2021-03-23 RX ORDER — BENZTROPINE MESYLATE 1 MG
0.5 TABLET ORAL ONCE
Refills: 0 | Status: COMPLETED | OUTPATIENT
Start: 2021-03-23 | End: 2021-03-23

## 2021-03-23 RX ORDER — DIVALPROEX SODIUM 500 MG/1
750 TABLET, DELAYED RELEASE ORAL ONCE
Refills: 0 | Status: COMPLETED | OUTPATIENT
Start: 2021-03-23 | End: 2021-03-23

## 2021-03-23 RX ADMIN — OLANZAPINE 20 MILLIGRAM(S): 15 TABLET, FILM COATED ORAL at 21:28

## 2021-03-23 RX ADMIN — Medication 0.5 MILLIGRAM(S): at 21:28

## 2021-03-23 RX ADMIN — DIVALPROEX SODIUM 750 MILLIGRAM(S): 500 TABLET, DELAYED RELEASE ORAL at 21:28

## 2021-03-23 RX ADMIN — Medication 1 MILLIGRAM(S): at 21:27

## 2021-03-23 RX ADMIN — Medication 2 MILLIGRAM(S): at 17:28

## 2021-03-23 NOTE — ED PROVIDER NOTE - CLINICAL SUMMARY MEDICAL DECISION MAKING FREE TEXT BOX
47 y/o F with PMH of PMHx of allergic rhinitis, GERD, PPHx of schizoaffective disorder, multiple hospitalizations most recently hospitalized in 3/2021 was BIB EMS for [facility] for psychosis and to be psychiatrically admitted for stabilization. Spoke with ACT team OH Chowdary [phone], he states pt was recently dcd from [facility] for similar, she is noncompliant with her meds and was agitated at the group home today, having AH and appearing internally preoccupied, he states she was in a catatonic state. PE as noted above. pt agitated on exam requiring IM ativan to obtain labs. 45 y/o F with PMH of PMHx of allergic rhinitis, GERD, PPHx of schizoaffective disorder, multiple hospitalizations most recently hospitalized in 3/2021 was BIB EMS for [facility] for psychosis and to be psychiatrically admitted for stabilization. Spoke with ACT team OH Chowdary [phone], he states pt was recently dcd from [facility] for similar, she is noncompliant with her meds and was agitated at the group home today, having AH and appearing internally preoccupied, he states she was in a catatonic state. PE as noted above. pt agitated on exam requiring IM ativan to obtain labs.  case s/o dr perez 47 y/o F with PMH of PMHx of allergic rhinitis, GERD, PPHx of schizoaffective disorder, multiple hospitalizations most recently hospitalized in 3/2021 was BIB EMS for [facility] for psychosis and to be psychiatrically admitted for stabilization. Spoke with ACT team OH Chowdary [phone], he states pt was recently dcd from [facility] for similar, she is noncompliant with her meds and was agitated at the group home today, having AH and appearing internally preoccupied, he states she was in a catatonic state. PE as noted above. pt agitated on exam requiring IM ativan to obtain labs.  case s/o dr ana perez: pt took evening po meds. slept without incidence. awaiting psych reassessment/additional collateral for final dispo. signed out to Dr Feldman [phone].

## 2021-03-23 NOTE — ED BEHAVIORAL HEALTH ASSESSMENT NOTE - HPI (INCLUDE ILLNESS QUALITY, SEVERITY, DURATION, TIMING, CONTEXT, MODIFYING FACTORS, ASSOCIATED SIGNS AND SYMPTOMS)
This is a 46 year old single, unemployed female, Mandarin-speaking, domiciled at [facility], with past psychiatric history of Intellectual disability and Schizoaffective disorder vs Schizophrenia, multiple hospitalizations (including a 440-day hospitalization from 4/2016-6/2017, most recently hospitalized from 2/21-3/9/21 for psychosis at [facility] [place]; hospitalized in 1/2021 prior to that), followed by ACT, unclear chart history of possible suicide attempt, chronic history of medication compliance issues with history of aggression when medication non-compliant, no substance use, no legal history and past medical history of allergic rhinitis, GERD and cardiomyopathy who presents to the ED BIB EMS activated by her residence for agitation and medication non-compliance. She is agitated with staff and uncooperative on arrival requiring IM Ativan for chemical sedation. She is otherwise unresponsive with ED provider. Psychiatry consulted for evaluation.     On assessment, patient waves hello in response to this writer and exclaims "good" when asked how she is doing in English. With use of mandarin  services, she relays "I'm feeling very well" and relays being in the hospital on orientation questions. She relays that she is hard of hearing and it is initially difficult for the  to hear her responses. She eventually says she is in the hospital because she "got a cold." Initial attempt at ROS is limited as  indicates her responses are nonsensical.  confirms with patient whether she speaks mandarin and patient affirms that she does. With further encouragement, she begins responding sensibly relaying that her sleep has been "good" but then states "I do not understand" when asked when she last ate. She says she has been taking her medications on time and refutes the staff's claims of medication non-compliance. She is told she will be offered her medications here and she asks if it will be something the doctor prescribes. She is reassured it will be the same mediations she was receiving when last hospitalized and she asks why it would be the same medications then says this writer is not a doctor. Despite reassurance and displayed identification, she repeatedly states not wanting this writer to prescribe her medicines. She stops responding to further questions.     COVID Exposure Screen- Patient  Have you had a COVID-19 test in the last 90 days? Unable to assess   Have you tested positive for COVID-19 antibodies? Unable to assess   Have you received 2 doses of the COVID-19 vaccine? Unable to assess   In the past 10 days, have you been around anyone with a positive COVID-19 Unable to assess  Have you been out of [place] within the past 10 days? Unable to assess     Collateral from [facility] [place] [facility] ([phone]) is as such:   Collateral relays that his colleague reported to him today that patient was screaming incoherently upon staff's arrival to the residence. An  could not make sense of what patient was saying. As patient was calmed by staff, patient then became unresponsive, making no eye contact, was non-verbal, briefly nodded her head in response to wanting to go somewhere as she was holding onto her coat but would not elaborate. Patient later resumed screaming incoherently. She appeared internally preoccupied. She then tried to run out of the building and staff intervened and redirected her. She has been at this residence for over a year now and collateral confirms the name and number as [facility] ([phone]). Collateral relays that medication compliance has been an issue for the patient and she has had prior instances of running out into the street impulsively without regard for her safety. She has refused all her medications for the last 3 days and so staff felt she should go to the hospital for a psychiatric evaluation. Collateral is unaware of her sleeping and eating habits otherwise. Collateral confirms that she was discharged from [facility] on 3/10 and notes that since discharge, patient has had intermittent poor communication relaying that patient is a poor communicator at baseline and often refuses to speak with an  but then will speak without it. Her intellectual understanding is limited and she fails to grasp some basic concepts. Collateral notes, for example, that patient has a portable interpretation device but doesn't know how use it and doesn't retain the education on how to use it. Collateral relays that the residence may have more information about patient's recent behaviors / functioning otherwise.     COVID Exposure Screen- Collateral  Has the patient had a COVID-19 test in the last 90 days? Yes; Negative tests prior to recent hospitalization  Has the patient tested positive for COVID-19 antibodies? Collateral does not believe so; though notes he is unaware of what occurred within the hospital admissions in regards to antibody testing  Has the patient received 2 doses of the COVID-19 vaccine? Kem does not believe so; he relays that staff have had difficulty educating patient and getting her to consent to the vaccine   In the past 10 days, has the patient been around anyone with a positive COVID-19 test? No  Has the patient been out of [place] within the past 10 days? No

## 2021-03-23 NOTE — ED BEHAVIORAL HEALTH ASSESSMENT NOTE - OTHER
peers selective Unable to assess "good" [facility], [facility], [facility], [facility], [facility], [facility], [facility], [facility] Out[facility] BH, [facility] ED, [facility], [facility] Inpatient, [facility] Outpatient, [facility] In[facility], [facility] E&A, [facility] Inpatient, [facility] ED, [facility], [facility], [facility], [facility], [facility] In[facility], [facility], [facility], Social Media (For example - [employer], [employer], [employer]), Web search, [facility] ACT SW selectively cooperative/uncooperative; oppositional; regressed minimizing, dismissive, ?suspicious impoverished

## 2021-03-23 NOTE — ED BEHAVIORAL HEALTH ASSESSMENT NOTE - SUMMARY
This is a 46 year old single, unemployed female, Mandarin-speaking, domiciled at [facility], with past psychiatric history of Intellectual disability and Schizoaffective disorder vs Schizophrenia, multiple hospitalizations (including a 440-day hospitalization from 4/2016-6/2017, most recently hospitalized from 2/21-3/9/21 for psychosis at [facility]; hospitalized in 1/2021 prior to that), followed by ACT, unclear chart history of possible suicide attempt, chronic history of medication compliance issues with history of aggression when medication non-compliant, no substance use, no legal history and past medical history of allergic rhinitis, GERD and cardiomyopathy who presents to the ED BIB EMS activated by her residence for agitation and medication non-compliance. She is agitated with staff and uncooperative on arrival requiring IM Ativan for chemical sedation. She is otherwise unresponsive with ED provider. Psychiatry consulted for evaluation.     Unlike previous documented presentations, patient presents as selectively non-verbal, volitionally oppositional and behaviorally regressed. She refutes the claims of medication non-compliance yet readily opposes the offer to receive her medications here in the ED. Her medication compliance issues a long-standing as evidenced in chart review and her regimen including a long acting once every 3 months injectable antipsychotic. She has also been recurrently admitted in recent months yet returns to a pattern of non-compliance soon after discharge. At this time, it is unclear whether patient is in fact acutely decompensated due to her thought disorder vs exhibiting baseline frustration intolerance and impulse control difficulties that correspond with her intellectual disability. As such, recommend continued observation in the ED, additional collateral from staff who are physically present in her GH, and correspondence with inpatient team at [facility] from her most recent admission prior to finalizing disposition recommendations.

## 2021-03-23 NOTE — ED BEHAVIORAL HEALTH ASSESSMENT NOTE - OTHER PAST PSYCHIATRIC HISTORY (INCLUDE DETAILS REGARDING ONSET, COURSE OF ILLNESS, INPATIENT/OUTPATIENT TREATMENT)
As per HPI  Additional chart review from 2/21 documentation is as such:  AOT order from 6/28/2019 to 6/28/2020  ACT team: Merrill Burgos.   Inpatient Hosp: [facility] 1/2016, [facility] 2/2016 (66 days), [facility] 4/2016 (433 days), [facility] 1/2020 (27 days); [facility] 12/17/2020-1/14/2021 (28 days).

## 2021-03-23 NOTE — ED BEHAVIORAL HEALTH ASSESSMENT NOTE - NSSUICRSKFACTOR_PSY_ALL_CORE
96
Current and Past Psychiatric Diagnoses/Presenting Symptoms/Historical Factors/Treatment Related Factors/Activating Events/Stressors

## 2021-03-23 NOTE — ED BEHAVIORAL HEALTH ASSESSMENT NOTE - PAST PSYCHOTROPIC MEDICATION
Per chart review; has been on Prozac, Seroquel, Invega Sustenna, Risperidone and Risperdal Consta before

## 2021-03-23 NOTE — ED ADULT NURSE NOTE - CHIEF COMPLAINT QUOTE
pt refusing to speak to . Sent by group home for psych eval and refusing to take her meds x 3 days    MD: PLEASE CALL ACT CRISIS TEAM [phone]

## 2021-03-23 NOTE — ED PROVIDER NOTE - ATTENDING CONTRIBUTION TO CARE
47 y/o F with PMH of PMHx of allergic rhinitis, GERD, PPHx of schizoaffective disorder, multiple hospitalizations most recently hospitalized in 3/2021 was BIB EMS for [facility] for psychosis and to be psychiatrically admitted for stabilization. Spoke with ACT team [place] Chowdary [phone], he states pt was recently dcd from [facility] for similar, she is noncompliant with her meds and was agitated at the group home today, having AH and appearing internally preoccupied, he states she was in a catatonic state. PE as noted above. pt agitated on exam requiring IM ativan to obtain labs.  Dr. Feldman:  I have reviewed and discussed with the PA/ resident the case specifics, including the history, physical assessment, evaluation, conclusion, laboratory results, and medical plan. I agree with the contents, and conclusions. I have personally examined, and interviewed the patient.

## 2021-03-23 NOTE — ED BEHAVIORAL HEALTH ASSESSMENT NOTE - NSPRESENTSXS_PSY_ALL_CORE
Psychosis/Impulsivity/Severe anxiety, agitation or panic/Refusal or inability to complete safety plan

## 2021-03-23 NOTE — ED BEHAVIORAL HEALTH ASSESSMENT NOTE - PSYCHIATRIC ISSUES AND PLAN (INCLUDE STANDING AND PRN MEDICATION)
Offer HS psychotropic medications as such: Klonopin 1 mg, Paliperidone 3 mg, Cogentin 0.5 mg, Depakote 750 mg and Zyprexa 20 mg once now

## 2021-03-23 NOTE — ED BEHAVIORAL HEALTH ASSESSMENT NOTE - DETAILS
[facility] admission x1 month (2/5-3/10/21) hx of aggression in setting of med noncompliance in past per chart and upon arrival to ED Discussed with ED provider See Collateral from [facility] SW / Additional collateral from GH pending Unable to assess pt reports having a cold but unable to elaborate further symptoms Chart documentation readily available; however will also reach out to Dr. Serrato (discharging MD during [facility] admission) Message left with Dr. Serrato (discharging MD during [facility] admission) at [phone] requesting callback

## 2021-03-23 NOTE — ED BEHAVIORAL HEALTH NOTE - BEHAVIORAL HEALTH NOTE
========================  COLLATERAL  ========================    NAME: Belem    NUMBER: Nura Nunez TRAN    RELATIONSHIP: Staff counselor    RELIABILITY: Good    COMMENTS: Present during incident    ========================  HPI  ========================    BASELINE FUNCTIONING: Patient resides at the group home with 13 other residents, there is normally 1-2 counselors on staff and patient's aren't monitored 1:1.     DATE HPI STARTED: ~3 days ago    DECOMPENSATION: Patient has been refusing to take her medication for the past 3 days, also counselor doesn't have documentation that patient received 3/15 injection. Patient had been doing relatively well despite not taking her medication until today when she was found screaming/crying, patient was seen by her therapist but patient was not responding and appeared almost catatonic or responding to internal stimuli. Patient at one point exited the group home and walked towards the street which she has done in the past. Patient didn't voice SI/HI or voice psychiatric complaints. They reached out the lead psychiatrist of the program Dr. Odom ([phone]) who suggested patient be sent to ED and per counselor seemed to think patient would need admission. Counselor states this is a pattern for her, she will be admitted and remain compliant for some time but then refuses medication leading to decompensation/hospitalization.     SUICIDALITY: None    VIOLENCE: None    SUBSTANCE: None    COVID Exposure Screen- collateral (i.e. third-party, chart review, belongings, etc; include EMS and ED staff)  1.	*Has the patient had a COVID-19 test in the last 90 days?  (  ) Yes   (  ) No   ( x ) Unknown- Reason: _Collateral unsure____  IF YES PROCEED TO QUESTION #2. IF NO OR UNKNOWN, PLEASE SKIP TO QUESTION #3.  2.	Date of test(s) and result(s): ________  3.	*Has the patient tested positive for COVID-19 antibodies? (  ) Yes   (  ) No   (x  ) Unknown- Reason: _____  IF YES PROCEED TO QUESTION #4. IF NO or UNKNOWN, PLEASE SKIP TO QUESTION #5.  4.	Date of positive antibody test: ________  5.	*Has the patient received 2 doses of the COVID-19 vaccine? (  ) Yes   (  ) No   ( x ) Unknown- Reason: _____  IF YES PROCEED TO QUESTION #6. IF NO or UNKNOWN, PLEASE SKIP TO QUESTION #7.  6.	 Date of second dose: ________  7.	*In the past 10 days, has the patient been around anyone with a positive COVID-19 test?* (  ) Yes   (  ) No   ( x ) Unknown- Reason: __  IF YES PROCEED TO QUESTION #8. IF NO or UNKNOWN, PLEASE SKIP TO QUESTION #13.  8.	Was the patient within 6 feet of them for at least 15 minutes? (  ) Yes   (  ) No   (  ) Unknown- Reason: _____  9.	Did the patient provide care for them? (  ) Yes   (  ) No   (  ) Unknown- Reason: ______  10.	Did the patient have direct physical contact with them (touched, hugged, or kissed them)? (  ) Yes   (  ) No    (  ) Unknown- Reason: __  11.	Did the patient share eating or drinking utensils with them? (  ) Yes   (  ) No    (  ) Unknown- Reason: ____  12.	Did they sneeze, cough, or somehow get respiratory droplets on the patient? (  ) Yes   (  ) No    (  ) Unknown- Reason: ______  13.	*Has the patient been out of [place] within the past 10 days?* (  ) Yes   ( x ) No   (  ) Unknown- Reason: _____  IF YES PLEASE ANSWER THE FOLLOWING QUESTIONS:  14.	Which state/country did they go to? ______  15.	Were they there over 24 hours? (  ) Yes   (  ) No    (  ) Unknown- Reason: ______  16.	Date of return to [facility]: ______

## 2021-03-23 NOTE — ED ADULT NURSE NOTE - NSIMPLEMENTINTERV_GEN_ALL_ED
Implemented All Fall Risk Interventions:  [place] to call system. Call bell, personal items and telephone within reach. Instruct patient to call for assistance. Room bathroom lighting operational. Non-slip footwear when patient is off stretcher. Physically safe environment: no spills, clutter or unnecessary equipment. Stretcher in lowest position, wheels locked, appropriate side rails in place. Provide visual cue, wrist band, yellow gown, etc. Monitor gait and stability. Monitor for mental status changes and reorient to person, place, and time. Review medications for side effects contributing to fall risk. Reinforce activity limits and safety measures with patient and family.

## 2021-03-23 NOTE — ED ADULT TRIAGE NOTE - CHIEF COMPLAINT QUOTE
pt refusing to speak to . Sent by group home for psych eval and refusing to take her meds x 3 days    MD: PLEASE CALL [facility] CRISIS TEAM [phone]

## 2021-03-23 NOTE — ED BEHAVIORAL HEALTH ASSESSMENT NOTE - NS ED BHA PLAN HOLD IN ED BH CONTACTED FT
Will reach out to Dr. Serrato (discharging MD during [facility] admission) Message left with Dr. Serrato (discharging MD during [facility] admission) at [phone] requesting callback

## 2021-03-23 NOTE — ED PROVIDER NOTE - OBJECTIVE STATEMENT
pt refusing to speak with mandarin    47 y/o F with PMH of PMHx of allergic rhinitis, GERD, PPHx of schizoaffective disorder, multiple hospitalizations most recently hospitalized in 3/2021 was BIB EMS for [facility] [place] for psychosis and to be psychiatrically admitted for stabilization. Spoke with ACT team [place] Chowdary [phone], he states pt was recently dcd from [facility] for similar, she is noncompliant with her meds and was agitated at the group home today, having AH and appearing internally preoccupied, he states she was in a catatonic state

## 2021-03-23 NOTE — ED BEHAVIORAL HEALTH ASSESSMENT NOTE - DESCRIPTION
PRE-HOSPITAL COURSE  SOURCE: Triage documentation  DETAILS: pt refusing to speak to . Sent by group home for psych eval and refusing to take her meds x 3 days.    ED COURSE  SOURCE: RN, ED provider and CO documentation / reports  ARRIVAL: EMS activated by   BEHAVIOR: On arrival; Pt is awake, confused, agitated, hearing voices and attempting to hit staff. Pt placed on 1:1 for safety will continue to monitor pt safety maintained. ED provider relays that patient speaks mandarin but has been refusing to talk even with  assistance. She has been agitated on exam, pulled her IV out, was not cooperating with triage  process and required PRN medications. She was subsequently noted to be calm in her stretcher but appeared internally preoccupied.    TREATMENT: Ativan 2 mg IM given at 1709  VISITORS: None as per HPI per chart review; reportedly abandoned by family, pt has her own power of  (as of 1/2020)

## 2021-03-23 NOTE — ED PROVIDER NOTE - NSFOLLOWUPINSTRUCTIONS_ED_ALL_ED_FT
Rest, drink plenty of fluids  Advance activity as tolerated  Continue all previously prescribed medications as directed  Follow up with your PMD 2-3 days- bring copies of your results  Return to the ER for worsening  follow up primary psychiatrist   medications as usual

## 2021-03-23 NOTE — ED ADULT NURSE NOTE - OBJECTIVE STATEMENT
Pt presents to ER from her group home with complaints that pt has not taken her prescribed psychiatric medication for three consecutive days. Pt has been having visual and auditory hallucinations, is at risk to harm herself and others. Pt is extremely agitated and aggressive and attempting to hit staff. Pt belongings removed and placed in the lobby closet next to security. Pt placed on 1:1 monitoring, pt is beginning to calm down, laying in stretcher and staring at wall.

## 2021-03-23 NOTE — ED PROVIDER NOTE - PATIENT PORTAL LINK FT
You can access the FollowMyHealth Patient Portal offered by [facility] by registering at the following website: [email or web address]. By joining [facility]’s FollowMyHealth portal, you will also be able to view your health information using other applications (apps) compatible with our system.

## 2021-03-24 VITALS
HEART RATE: 100 BPM | TEMPERATURE: 98 F | DIASTOLIC BLOOD PRESSURE: 78 MMHG | OXYGEN SATURATION: 96 % | SYSTOLIC BLOOD PRESSURE: 114 MMHG | RESPIRATION RATE: 16 BRPM

## 2021-03-24 LAB
AMPHET UR-MCNC: NEGATIVE — SIGNIFICANT CHANGE UP
APPEARANCE UR: CLEAR — SIGNIFICANT CHANGE UP
BARBITURATES UR SCN-MCNC: NEGATIVE — SIGNIFICANT CHANGE UP
BENZODIAZ UR-MCNC: NEGATIVE — SIGNIFICANT CHANGE UP
BILIRUB UR-MCNC: NEGATIVE — SIGNIFICANT CHANGE UP
COCAINE METAB.OTHER UR-MCNC: NEGATIVE — SIGNIFICANT CHANGE UP
COLOR SPEC: YELLOW — SIGNIFICANT CHANGE UP
COVID-19 SPIKE DOMAIN AB INTERP: NEGATIVE — SIGNIFICANT CHANGE UP
COVID-19 SPIKE DOMAIN ANTIBODY RESULT: 0.4 U/ML — SIGNIFICANT CHANGE UP
DIFF PNL FLD: NEGATIVE — SIGNIFICANT CHANGE UP
GLUCOSE UR QL: NEGATIVE — SIGNIFICANT CHANGE UP
KETONES UR-MCNC: ABNORMAL
LEUKOCYTE ESTERASE UR-ACNC: NEGATIVE — SIGNIFICANT CHANGE UP
METHADONE UR-MCNC: NEGATIVE — SIGNIFICANT CHANGE UP
NITRITE UR-MCNC: NEGATIVE — SIGNIFICANT CHANGE UP
OPIATES UR-MCNC: NEGATIVE — SIGNIFICANT CHANGE UP
PCP SPEC-MCNC: SIGNIFICANT CHANGE UP
PCP UR-MCNC: NEGATIVE — SIGNIFICANT CHANGE UP
PH UR: 7 — SIGNIFICANT CHANGE UP (ref 5–8)
PROT UR-MCNC: NEGATIVE — SIGNIFICANT CHANGE UP
SARS-COV-2 IGG+IGM SERPL QL IA: 0.4 U/ML — SIGNIFICANT CHANGE UP
SARS-COV-2 IGG+IGM SERPL QL IA: NEGATIVE — SIGNIFICANT CHANGE UP
SP GR SPEC: 1.01 — SIGNIFICANT CHANGE UP (ref 1.01–1.02)
THC UR QL: NEGATIVE — SIGNIFICANT CHANGE UP
UROBILINOGEN FLD QL: 1

## 2021-03-24 PROCEDURE — 96372 THER/PROPH/DIAG INJ SC/IM: CPT

## 2021-03-24 PROCEDURE — 87635 SARS-COV-2 COVID-19 AMP PRB: CPT

## 2021-03-24 PROCEDURE — 71045 X-RAY EXAM CHEST 1 VIEW: CPT

## 2021-03-24 PROCEDURE — 36415 COLL VENOUS BLD VENIPUNCTURE: CPT

## 2021-03-24 PROCEDURE — 85025 COMPLETE CBC W/AUTO DIFF WBC: CPT

## 2021-03-24 PROCEDURE — 86769 SARS-COV-2 COVID-19 ANTIBODY: CPT

## 2021-03-24 PROCEDURE — 80048 BASIC METABOLIC PNL TOTAL CA: CPT

## 2021-03-24 PROCEDURE — 99213 OFFICE O/P EST LOW 20 MIN: CPT | Mod: 95

## 2021-03-24 PROCEDURE — 93005 ELECTROCARDIOGRAM TRACING: CPT

## 2021-03-24 PROCEDURE — 99285 EMERGENCY DEPT VISIT HI MDM: CPT | Mod: 25

## 2021-03-24 PROCEDURE — 80307 DRUG TEST PRSMV CHEM ANLYZR: CPT

## 2021-03-24 NOTE — PROGRESS NOTE BEHAVIORAL HEALTH - NSBHCONSULTFOLLOWAFTERCARE_PSY_A_CORE FT
return to housing  close f/u with outpatient providers   behavioral implementations at the home to reward for medication compliance.

## 2021-03-24 NOTE — PROGRESS NOTE BEHAVIORAL HEALTH - NSBHCHARTREVIEWVS_PSY_A_CORE FT
VITAL SIGNS (Last 24 hrs):  T(C): 36.8 (03-24-21 @ 03:03), Max: 36.8 (03-23-21 @ 18:00)  HR: 88 (03-24-21 @ 03:03) (88 - 99)  BP: 112/78 (03-24-21 @ 03:03) (112/78 - 127/80)  RR: 17 (03-24-21 @ 03:03) (17 - 19)  SpO2: 97% (03-24-21 @ 03:03) (95% - 97%)  Wt(kg): --  Daily Height in cm: 142.24 (23 Mar 2021 16:34)

## 2021-03-24 NOTE — PROGRESS NOTE BEHAVIORAL HEALTH - RISK ASSESSMENT
risk factors- chronic psychiatric illness, chronically poor frustration tolerance/impulse control/coping skills, limited understanding of condition with behavioral disturbance contributing to chronically poor medication non-compliance.

## 2021-03-24 NOTE — PROGRESS NOTE BEHAVIORAL HEALTH - OTHER
impoverished Unable to assess pt denies. not acutely responding to internal stimuli. selective "good" impoverished; concrete selectively cooperative/uncooperative; oppositional; regressed minimizing, dismissive

## 2021-03-24 NOTE — PROGRESS NOTE BEHAVIORAL HEALTH - SUMMARY
"This is a 46 year old single, unemployed female, Mandarin-speaking, domiciled at [facility], with past psychiatric history of Intellectual disability and Schizoaffective disorder vs Schizophrenia, multiple hospitalizations (including a 440-day hospitalization from 4/2016-6/2017, most recently hospitalized from 2/21-3/9/21 for psychosis at [facility]; hospitalized in 1/2021 prior to that), followed by ACT, unclear chart history of possible suicide attempt, chronic history of medication compliance issues with history of aggression when medication non-compliant, no substance use, no legal history and past medical history of allergic rhinitis, GERD and cardiomyopathy who presents to the ED BIB EMS activated by her residence for agitation and medication non-compliance. She is agitated with staff and uncooperative on arrival requiring IM Ativan for chemical sedation. She is otherwise unresponsive with ED provider. Psychiatry consulted for evaluation."     as per previous provider, "it is unclear whether patient is in fact acutely decompensated due to her thought disorder vs exhibiting baseline frustration intolerance and impulse control difficulties that correspond with her intellectual disability." plan was to observe pt overnight and obtain additional collateral. "This is a 46 year old single, unemployed female, Mandarin-speaking, domiciled at [facility] group home, with past psychiatric history of Intellectual disability and Schizoaffective disorder vs Schizophrenia, multiple hospitalizations (including a 440-day hospitalization from 4/2016-6/2017, most recently hospitalized from 2/21-3/9/21 for psychosis at [facility]; hospitalized in 1/2021 prior to that), followed by ACT, unclear chart history of possible suicide attempt, chronic history of medication compliance issues with history of aggression when medication non-compliant, no substance use, no legal history and past medical history of allergic rhinitis, GERD and cardiomyopathy who presents to the ED BIB EMS activated by her residence for agitation and medication non-compliance. She is agitated with staff and uncooperative on arrival requiring IM Ativan for chemical sedation. She is otherwise unresponsive with ED provider. Psychiatry consulted for evaluation."     as per previous provider, "it is unclear whether patient is in fact acutely decompensated due to her thought disorder vs exhibiting baseline frustration intolerance and impulse control difficulties that correspond with her intellectual disability." plan was to observe pt overnight and obtain additional collateral.    pt was calm and cooperative overnight and this morning. she has been observed for an extended amount of time. since she accepted her medications willingly last night, her mental status has improved to her baseline. she is euthymic, denying any si/hi, states that she is willing to take her medications and  return to her group home. at baseline she continues to be disorganized (switching between mandarin and ? nonsensical language vs. unknown dialect) and have no insight into illness- however her intellectual disability is also contributing to her lack of understanding. pt is a risk for returning to the ER and decompensation as she has a h/o chronic psychiatric illness (with risk factors below), however at this time she is back to her baseline and acute hospitalization would not change her chronic risk factors.  she has no h/o violence or suicidality. case discussed with her ACT team therapist and housing.

## 2021-03-24 NOTE — ED ADULT NURSE REASSESSMENT NOTE - NS ED NURSE REASSESS COMMENT FT1
pt remains calf at this time s/w tele psych . they will interview pt later today 1 to remains for pt safety.

## 2021-03-24 NOTE — PROGRESS NOTE BEHAVIORAL HEALTH - NSBHCONSULTMEDS_PSY_A_CORE FT
pt received all these standing medications last night:  cogentin 0.5mg PO daily  klonopin 1mg PO daily  depakote DR 750mg daily  zyprexa 20mg qHS

## 2021-03-24 NOTE — ED BEHAVIORAL HEALTH NOTE - BEHAVIORAL HEALTH NOTE
Telepsych reassessment:    Handoff received from previous provider    Pt currently sleeping. Per RN,       A/P:   This is a 46 year old single, unemployed female, Mandarin-speaking, domiciled at [facility], with past psychiatric history of Intellectual disability and Schizoaffective disorder vs Schizophrenia, multiple hospitalizations (including a 440-day hospitalization from 4/2016-6/2017, most recently hospitalized from 2/21-3/9/21 for psychosis at [facility]; hospitalized in 1/2021 prior to that), followed by ACT, unclear chart history of possible suicide attempt, chronic history of medication compliance issues with history of aggression when medication non-compliant, no substance use, no legal history and past medical history of allergic rhinitis, GERD and cardiomyopathy who presents to the ED BIB EMS activated by her residence for agitation and medication non-compliance. She is agitated with staff and uncooperative on arrival requiring IM Ativan for chemical sedation. She is otherwise unresponsive with ED provider. Psychiatry consulted for evaluation.     Unlike previous documented presentations, patient presents as selectively non-verbal, volitionally oppositional and behaviorally regressed. She refutes the claims of medication non-compliance yet readily opposes the offer to receive her medications here in the ED. Her medication compliance issues a long-standing as evidenced in chart review and her regimen including a long acting once every 3 months injectable antipsychotic. She has also been recurrently admitted in recent months yet returns to a pattern of non-compliance soon after discharge. At this time, it is unclear whether patient is in fact acutely decompensated due to her thought disorder vs exhibiting baseline frustration intolerance and impulse control difficulties that correspond with her intellectual disability. As such, recommend continued observation in the ED, additional collateral from staff who are physically present in her , and correspondence with inpatient team at [facility] from her most recent admission prior to finalizing disposition recommendations.    -Continue to hold in ER pending further collateral from   -Continue 1:1 while in ER  - Telepsych reassessment:    Handoff received from previous provider    Pt currently sleeping. Per RN, patient accepted regular nighttime medications without incident. RN reports patient has been sleeping through most of night       A/P:   This is a 46 year old single, unemployed female, Mandarin-speaking, domiciled at [facility], with past psychiatric history of Intellectual disability and Schizoaffective disorder vs Schizophrenia, multiple hospitalizations (including a 440-day hospitalization from 4/2016-6/2017, most recently hospitalized from 2/21-3/9/21 for psychosis at [facility]; hospitalized in 1/2021 prior to that), followed by ACT, unclear chart history of possible suicide attempt, chronic history of medication compliance issues with history of aggression when medication non-compliant, no substance use, no legal history and past medical history of allergic rhinitis, GERD and cardiomyopathy who presents to the ED BIB EMS activated by her residence for agitation and medication non-compliance. She is agitated with staff and uncooperative on arrival requiring IM Ativan for chemical sedation. She is otherwise unresponsive with ED provider. Psychiatry consulted for evaluation.     Unlike previous documented presentations, patient presents as selectively non-verbal, volitionally oppositional and behaviorally regressed. She refutes the claims of medication non-compliance yet readily opposes the offer to receive her medications here in the ED. Her medication compliance issues a long-standing as evidenced in chart review and her regimen including a long acting once every 3 months injectable antipsychotic. She has also been recurrently admitted in recent months yet returns to a pattern of non-compliance soon after discharge. At this time, it is unclear whether patient is in fact acutely decompensated due to her thought disorder vs exhibiting baseline frustration intolerance and impulse control difficulties that correspond with her intellectual disability. As such, recommend continued observation in the ED, additional collateral from staff who are physically present in her , and correspondence with inpatient team at [facility] from her most recent admission prior to finalizing disposition recommendations.    -Continue to hold in ER pending further collateral from   -Continue 1:1 while in ER  - Telepsych reassessment:    Handoff received from previous provider    Pt currently sleeping. Per RN, patient accepted regular nighttime medications without incident. RN reports patient has been sleeping comfortably through most of night. No acute incidents overnight or behavioral prns required.     A/P:   This is a 46 year old single, unemployed female, Mandarin-speaking, domiciled at [facility], with past psychiatric history of Intellectual disability and Schizoaffective disorder vs Schizophrenia, multiple hospitalizations (including a 440-day hospitalization from 4/2016-6/2017, most recently hospitalized from 2/21-3/9/21 for psychosis at [facility]; hospitalized in 1/2021 prior to that), followed by ACT, unclear chart history of possible suicide attempt, chronic history of medication compliance issues with history of aggression when medication non-compliant, no substance use, no legal history and past medical history of allergic rhinitis, GERD and cardiomyopathy who presents to the ED BIB EMS activated by her residence for agitation and medication non-compliance. She is agitated with staff and uncooperative on arrival requiring IM Ativan for chemical sedation. She is otherwise unresponsive with ED provider. Psychiatry consulted for evaluation.     Unlike previous documented presentations, patient presents as selectively non-verbal, volitionally oppositional and behaviorally regressed. She refutes the claims of medication non-compliance yet readily opposes the offer to receive her medications here in the ED. Her medication compliance issues a long-standing as evidenced in chart review and her regimen including a long acting once every 3 months injectable antipsychotic. She has also been recurrently admitted in recent months yet returns to a pattern of non-compliance soon after discharge. At this time, it is unclear whether patient is in fact acutely decompensated due to her thought disorder vs exhibiting baseline frustration intolerance and impulse control difficulties that correspond with her intellectual disability. As such, recommend continued observation in the ED, additional collateral from staff who are physically present in her , and correspondence with inpatient team at [facility] from her most recent admission prior to finalizing disposition recommendations.    -Continue to hold in ER pending additional collateral from [facility],  staff, and outpatient psychiatrist  -Continue 1:1 while in ER

## 2021-03-24 NOTE — PROGRESS NOTE BEHAVIORAL HEALTH - NSBHCHARTREVIEWLAB_PSY_A_CORE FT
LABS:                          13.1   8.82  )-----------( 229      ( 23 Mar 2021 16:45 )             41.0     03-23    138  |  106  |  12  ----------------------------<  101<H>  4.6   |  21<L>  |  0.42<L>    Ca    9.1      23 Mar 2021 16:45

## 2021-03-24 NOTE — ED BEHAVIORAL HEALTH NOTE - BEHAVIORAL HEALTH NOTE
========================  FOR EACH COLLATERAL  ========================  NAME: Beryl Briscoe  NUMBER: [phone] ext. [phone]  RELATIONSHIP: Therapist from [facility] [place]  RELIABILITY: N/A  COMMENTS: BTCM attempted to contact collateral however they were unavailable. BTCM left a v/m requesting a call back.    ========================  FOR EACH COLLATERAL  ========================  NAME: Raman OlivaresJOHNNIE  NUMBER: [phone] ext. [phone]  RELATIONSHIP:   RELIABILITY: N/A  COMMENTS: BTCM attempted to contact collateral however they were unavailable. BTCM left a v/m requesting a call back.    CM spoke with patient’s ACT team OH Oswald [phone] and [facility]  Laisha [phone] to inform of disposition to discharge patient back to group [place]. BTCM informed that as per BTA re-assessment, patient is presenting to be back at her baseline and does not present with any acute psychiatric safety concerns at this time. Both individuals did not express safety concerns and are in agreement with discharge plan. BTCM instructed them to call 911 in the event patient becomes a danger to themselves/others again in the future.

## 2021-03-24 NOTE — PROGRESS NOTE BEHAVIORAL HEALTH - NSBHADMITCOUNSEL_PSY_A_CORE
diagnostic results/impressions and/or recommended studies/instructions for management, treatment and follow up/importance of adherence to chosen treatment

## 2021-03-24 NOTE — PROGRESS NOTE BEHAVIORAL HEALTH - NSBHFUPINTERVALHXFT_PSY_A_CORE
ELIZABETH Fuchs- no PRN needed overnight. slept though the night. breakfast has not yet been served. this morning she has been mostly sleeping; occasionally going up to the bathroom. pt has not demonstrated any odd behavior but also has not been talking much to the ED staff. RN Shila- no PRN needed overnight. slept though the night. breakfast has not yet been served. this morning she has been mostly sleeping; occasionally going up to the bathroom. pt has not demonstrated any odd behavior but also has not been talking much to the ED staff.    writer used mandarin  to speak with the pt. she was childlike and giggled and repeated "Hi" several times.  expressed concern that at times pt was responding appropriately, but at other times appeared to speaking ? a different language. speech was also mumbled at times and difficult to understand. notably, pt did better holding the  phone rather than just talking into the telepsych cart (video and audio from cart still on). the pt also did better with direct simple questions. she denied any SI/I/P. she denied any HI/I/P. she denied any AVH and did not appear internally preoccupied during the assessment. pt states that she would like to rest for a bit in the ER and would then feel ready to return to her residence. pt answered "yes" to feeling safe in the residence. she smiled and said that she willingly took medications that where offered to her in the ER (previously suspicious of Telepsychiatrist last night) and said that she would take them at home too. she continues to have poor insight into her reason for coming to the hospital- believed that she was here for fever.    see [facility] note for additional information for collateral-  writer attempted to call previous [facility] inpatient psychiatrist- second VM left; however D/C summary was reviewed. messages were left for outpatient psychiatrist and therapist.   writer did speak directly with ACT team OH Chowdary who knows the pt well. he confirms hx as previously obtained. he states that pt is doing somewhat better since she has been in current residence this past year (better than when she was hospitalized for a year in 2016 for significant psychosis). when pt does take her medications, she is more organized; however pt has a pattern of taking medications in the hospital, stabilizing and then refusing to take medications when she returns to the residence. she does have limited understanding of her illness but also has difficulty understanding information- such as consenting for covid injection. prior note states that pt even has a difficult time working her  device. at baseline she thought disorder, however is not violent or suicidal. yesterday she wasn't violent in the home, however started yelling and then put on coat to leave. she has done this in the past; trigger is unknown other than pt not having medication in several days. pt still will occasionally ask why her parents do not pick her up and think she has been in the same facility for 20yrs. transferring to [facility] facility has been difficult because of language barrier and pt's chronic psychiatric illness. it is even difficult for them to get official testing on her. they have considered a transfer to group home in a mandarin speaking neighborhood, however family has declined to seek residence in [facility]. discussed that perhaps adjustments could be made to RAMOS which she does take.

## 2021-05-20 NOTE — ED BEHAVIORAL HEALTH ASSESSMENT NOTE - REASON FOR INPATIENT LEVEL CARE
Dear Juan Manuel Hernandez,  Thank you for visiting the [facility] 
Danger to others

## 2021-06-04 NOTE — DISCUSSION/SUMMARY
[FreeTextEntry1] : 44 W who has history of schizophrenia who is here because staff thinks she may benefit from a day program where there are other residents who speak mandarin. She may also benefit from a mandarin speaking neuropsychologist to help determine if she has any cognitive issues. \par \par More than 50% of time spent on counseling and educate patient on differential, workup, disease course, and treatment/management. Education was provided to the patient during this encounter. All questions and concerns were answered and addressed in detail. The patient verbalized understanding and agreed to plan. Patient was advised to continue to monitor for neurologic symptoms and to notify my office or go to the nearest emergency room if there are any changes. \par Side effects of the above medications were discussed in detail including but not limited to applicable black box warning and teratogenicity as appropriate. \par Patient was advised to bring previous records to my office. \par \par 
[FreeTextEntry1] : She is accompanied by her daughter and reports feeling okay overall.  She has been taking Bumex for 4 doses per week with no leg swelling or orthopnea reported.\par She continues to have exertional shortness of breath and uses a walker to steady herself when she walks.\par No pacemaker issues.\par Her pacemaker was interrogated today.\par Recent blood work showed a slight increase in her proBNP level.\par No bleeding or falls.\par \par Prior:\par She is accompanied by her daughter and has been living in [facility].\par She has been somewhat more fatigued as she wakes up around noon and then feels tired in the afternoon requiring a nap.  She reports keeping water near her to remind her to drink but does not always finish her water.\par She denies any orthopnea, PND or leg edema.\par She is taking all of her medications as directed including Bumex qod.\par No falls or bleeding.

## 2021-07-05 ENCOUNTER — APPOINTMENT (OUTPATIENT)
Dept: ULTRASOUND IMAGING | Facility: HOSPITAL | Age: 46
End: 2021-07-05

## 2021-07-23 ENCOUNTER — EMERGENCY (EMERGENCY)
Facility: HOSPITAL | Age: 46
LOS: 1 days | Discharge: HOME | End: 2021-07-23
Attending: EMERGENCY MEDICINE | Admitting: EMERGENCY MEDICINE
Payer: MEDICAID

## 2021-07-23 VITALS
WEIGHT: 172.4 LBS | HEART RATE: 101 BPM | DIASTOLIC BLOOD PRESSURE: 85 MMHG | SYSTOLIC BLOOD PRESSURE: 134 MMHG | HEIGHT: 56 IN | OXYGEN SATURATION: 93 % | RESPIRATION RATE: 18 BRPM | TEMPERATURE: 99 F

## 2021-07-23 VITALS
HEART RATE: 85 BPM | OXYGEN SATURATION: 97 % | RESPIRATION RATE: 16 BRPM | DIASTOLIC BLOOD PRESSURE: 74 MMHG | TEMPERATURE: 99 F | SYSTOLIC BLOOD PRESSURE: 113 MMHG

## 2021-07-23 PROCEDURE — 99282 EMERGENCY DEPT VISIT SF MDM: CPT

## 2021-07-23 PROCEDURE — 99284 EMERGENCY DEPT VISIT MOD MDM: CPT

## 2021-07-23 RX ORDER — BENZTROPINE MESYLATE 1 MG
0 TABLET ORAL
Qty: 0 | Refills: 0 | DISCHARGE

## 2021-07-23 RX ORDER — CLONAZEPAM 1 MG
1 TABLET ORAL
Qty: 0 | Refills: 0 | DISCHARGE

## 2021-07-23 RX ORDER — DIVALPROEX SODIUM 500 MG/1
3 TABLET, DELAYED RELEASE ORAL
Qty: 0 | Refills: 0 | DISCHARGE

## 2021-07-23 RX ORDER — CLONAZEPAM 1 MG
0 TABLET ORAL
Qty: 0 | Refills: 0 | DISCHARGE

## 2021-07-23 RX ORDER — PALIPERIDONE 1.5 MG/1
819 TABLET, EXTENDED RELEASE ORAL
Qty: 0 | Refills: 0 | DISCHARGE

## 2021-07-23 NOTE — ED ADULT NURSE NOTE - OBJECTIVE STATEMENT
pt PIA from [facility] group home, per EMS they were called because pt was sitting watching TV for 10 hours, but when EMS arrived she stood up and tried to run away. Pt reports chronic dizziness x years. Denies all other medical complaints. Reports she ate lunch. Spoke with staff member at the facility she states pt was aggressive and agitated this morning, she saw her psychiatrist a few days ago and they might adjust her medications. pt with PMH of schizophrenia, pt answering questions and cooperative.

## 2021-07-23 NOTE — ED PROVIDER NOTE - PATIENT PORTAL LINK FT
You can access the FollowMyHealth Patient Portal offered by [facility] by registering at the following website: [email or web address]. By joining [facility]’s FollowMyHealth portal, you will also be able to view your health information using other applications (apps) compatible with our system.

## 2021-07-23 NOTE — ED PROVIDER NOTE - ATTENDING CONTRIBUTION TO CARE
Kristopher with [facility]. 47 y/o F with PMH of schizophrenia was BIB EMS from [facility] for medical evaluation. Per EMS they were called because pt was sitting watching TV for 10 hours, but when EMS arrived she stood up and tried to run away. Pt reports chronic dizziness x years. Denies all other medical complaints. Reports she ate lunch.     We spoke with staff member at the facility she states pt was aggressive and agitated this morning, she saw her psychiatrist a few days ago and they might adjust her medications. PE as noted above. pt well appearing and calm and cooperative. pt stable for dc    I performed a face to face bedside interview with patient regarding history of present illness, review of symptoms and past medical history. I completed an independent physical exam.  I have discussed the patient's plan of care with Physician Assistant (PA). I agree with note as stated above, having amended the EMR as needed to reflect my findings.   This includes History of Present Illness, HIV, Past Medical/Surgical/Family/Social History, Allergies and Home Medications, Review of Systems, Physical Exam, and any Progress Notes during the time I functioned as the attending physician for this patient.

## 2021-07-23 NOTE — ED ADULT NURSE NOTE - CHIEF COMPLAINT QUOTE
BIBA from [facility], Nurse called because she was sitting for 10 hours. EMS reports she ran when they arrived. Answering questions and cooperative in triage. Denies complaints.

## 2021-07-23 NOTE — ED PROVIDER NOTE - CLINICAL SUMMARY MEDICAL DECISION MAKING FREE TEXT BOX
45 y/o F with PMH of schizophrenia was BIB EMS from [facility] for medical evaluation. Per EMS they were called because pt was sitting watching TV for 10 hours, but when EMS arrived she stood up and tried to run away. Pt reports chronic dizziness x years. Denies all other medical complaints. Reports she ate lunch. Spoke with staff member at the facility she states pt was aggressive and agitated this morning, she saw her psychiatrist a few days ago and they might adjust her medications. PE as noted above. pt well appearing and calm and cooperative. pt stable for dc 47 y/o F with PMH of schizophrenia was BIB EMS from [facility] for medical evaluation. Per EMS they were called because pt was sitting watching TV for 10 hours, but when EMS arrived she stood up and tried to run away. Pt reports chronic dizziness x years. Denies all other medical complaints. Reports she ate lunch. Spoke with staff member at the facility she states pt was aggressive and agitated this morning, she saw her psychiatrist a few days ago and they might adjust her medications. PE as noted above. pt well appearing and calm and cooperative. pt stable for dc

## 2021-07-23 NOTE — ED ADULT TRIAGE NOTE - CHIEF COMPLAINT QUOTE
BIBA from [facility], Nurse called because she was sitting for 10 hours. EMS reports she ran when they arrived. Answering questions and cooperative in triage. Denies complaints.
no

## 2021-07-23 NOTE — ED PROVIDER NOTE - OBJECTIVE STATEMENT
#[id number]  47 y/o F with PMH of schizophrenia was BIB EMS from [facility] for medical evaluation. Per EMS they were called because pt was sitting watching TV for 10 hours, but when EMS arrived she stood up and tried to run away. Pt reports chronic dizziness x years. Denies all other medical complaints. Reports she ate lunch. Spoke with staff member at the facility she states pt was aggressive and agitated this morning, she saw her psychiatrist a few days ago and they might adjust her medications.

## 2021-07-23 NOTE — ED PROVIDER NOTE - NSFOLLOWUPINSTRUCTIONS_ED_ALL_ED_FT
Follow up with your primary care physician and your psychiatrist within 2-3 days      Stay hydrated    Return to the ER if your symptoms worsen or for any other medical emergencies

## 2021-08-03 ENCOUNTER — APPOINTMENT (OUTPATIENT)
Dept: ULTRASOUND IMAGING | Facility: HOSPITAL | Age: 46
End: 2021-08-03

## 2022-01-25 ENCOUNTER — EMERGENCY (EMERGENCY)
Facility: HOSPITAL | Age: 47
LOS: 1 days | Discharge: HOME | End: 2022-01-25
Attending: EMERGENCY MEDICINE | Admitting: EMERGENCY MEDICINE
Payer: MEDICAID

## 2022-01-25 VITALS
SYSTOLIC BLOOD PRESSURE: 123 MMHG | RESPIRATION RATE: 17 BRPM | TEMPERATURE: 98 F | HEART RATE: 62 BPM | DIASTOLIC BLOOD PRESSURE: 64 MMHG | OXYGEN SATURATION: 98 %

## 2022-01-25 VITALS
RESPIRATION RATE: 16 BRPM | HEART RATE: 61 BPM | HEIGHT: 56 IN | SYSTOLIC BLOOD PRESSURE: 114 MMHG | OXYGEN SATURATION: 96 % | TEMPERATURE: 98 F | DIASTOLIC BLOOD PRESSURE: 63 MMHG | WEIGHT: 160.5 LBS

## 2022-01-25 PROCEDURE — 99284 EMERGENCY DEPT VISIT MOD MDM: CPT

## 2022-01-25 PROCEDURE — 99283 EMERGENCY DEPT VISIT LOW MDM: CPT

## 2022-01-25 RX ORDER — CLONAZEPAM 1 MG
0.5 TABLET ORAL ONCE
Refills: 0 | Status: DISCONTINUED | OUTPATIENT
Start: 2022-01-25 | End: 2022-01-25

## 2022-01-25 RX ORDER — OLANZAPINE 15 MG/1
20 TABLET, FILM COATED ORAL ONCE
Refills: 0 | Status: COMPLETED | OUTPATIENT
Start: 2022-01-25 | End: 2022-01-25

## 2022-01-25 RX ORDER — DIVALPROEX SODIUM 500 MG/1
1000 TABLET, DELAYED RELEASE ORAL ONCE
Refills: 0 | Status: COMPLETED | OUTPATIENT
Start: 2022-01-25 | End: 2022-01-25

## 2022-01-25 RX ORDER — BENZTROPINE MESYLATE 1 MG
1 TABLET ORAL ONCE
Refills: 0 | Status: COMPLETED | OUTPATIENT
Start: 2022-01-25 | End: 2022-01-25

## 2022-01-25 RX ADMIN — DIVALPROEX SODIUM 1000 MILLIGRAM(S): 500 TABLET, DELAYED RELEASE ORAL at 16:47

## 2022-01-25 RX ADMIN — Medication 0.5 MILLIGRAM(S): at 16:46

## 2022-01-25 RX ADMIN — Medication 1 MILLIGRAM(S): at 16:46

## 2022-01-25 RX ADMIN — OLANZAPINE 20 MILLIGRAM(S): 15 TABLET, FILM COATED ORAL at 16:46

## 2022-01-25 NOTE — ED PROVIDER NOTE - CLINICAL SUMMARY MEDICAL DECISION MAKING FREE TEXT BOX
pt 48 yo f hx schizophrenia and schizoaffective disorder sent from [facility] because she refused her meds the last 3 days (which as per old notes- pt has a hx of non compliance and is part of her dx) pt also took a pts dose of folic acid and thiamine. has hx of refusing  services. speaks mandarin.  called ACT team SW [facility] [phone] and [facility]  Laisha [phone] ext [phone] and LM for return call. in the meantime pt took her medications without any issue. spoke to SHIRLEY from [facility] and ED will arrange transport back to facility

## 2022-01-25 NOTE — ED PROVIDER NOTE - OBJECTIVE STATEMENT
pt 48 yo f hx schizophrenia and schizoaffective disorder sent from [facility] because she refused her meds the last 3 days (which as per old notes- pt has a hx of non compliance and is part of her dx) pt also took a pts dose of folic acid and thiamine. has hx of refusing  services. speaks mandarin.

## 2022-01-25 NOTE — ED ADULT NURSE NOTE - CHIEF COMPLAINT QUOTE
BIB EMS from [facility] for noncompliance with medications x3 days. Pt also took another residents medications (thiamine and folic acid)

## 2022-01-25 NOTE — ED PROVIDER NOTE - ATTENDING CONTRIBUTION TO CARE
Kristopher with [facility]. pt 46 yo f hx schizophrenia and schizoaffective disorder sent from [facility] because she refused her meds the last 3 days (which as per old notes- pt has a hx of non compliance and is part of her dx) pt also took a pts dose of folic acid and thiamine. has hx of refusing  services. speaks mandarin.  called ACT team [facility] [phone] and [facility]  Laisha [phone] ext [phone] and LM for return call. in the meantime pt took her medications without any issue. spoke to SHIRLEY from [facility] and ED will arrange transport back to facility    I performed a face to face bedside interview with patient regarding history of present illness, review of symptoms and past medical history. I completed an independent physical exam.  I have discussed the patient's plan of care with Physician Assistant (PA). I agree with note as stated above, having amended the EMR as needed to reflect my findings.   This includes History of Present Illness, HIV, Past Medical/Surgical/Family/Social History, Allergies and Home Medications, Review of Systems, Physical Exam, and any Progress Notes during the time I functioned as the attending physician for this patient.

## 2022-01-25 NOTE — ED PROVIDER NOTE - NSFOLLOWUPINSTRUCTIONS_ED_ALL_ED_FT
Please follow-up with your doctor(s) within the next 3 days, but see medical sooner if your symptoms persist or worsen.  Please call tomorrow for an appointment.    You were given a copy of your labs and/or imaging.  Please go-over these with your doctor(s).     If you have any worsening of symptoms or any other concerns please see your doctor or return to the ED immediately.    Please continue taking your home medications as directed.  Do not use alcohol when taking any medication (especially antibiotics, tylenol or other pain medication) unless you check with the doctor or pharmacist. PATIENT TOOK HER MEDICATIONS IN THE EMERGENCY DEPARTMENT WITHOUT ANY ISSUES     Please follow-up with your doctor(s) within the next 3 days, but see medical sooner if your symptoms persist or worsen.  Please call tomorrow for an appointment.    You were given a copy of your labs and/or imaging.  Please go-over these with your doctor(s).     If you have any worsening of symptoms or any other concerns please see your doctor or return to the ED immediately.    Please continue taking your home medications as directed.  Do not use alcohol when taking any medication (especially antibiotics, tylenol or other pain medication) unless you check with the doctor or pharmacist.

## 2022-01-25 NOTE — ED ADULT TRIAGE NOTE - CHIEF COMPLAINT QUOTE
BIB EMS from [facility] for noncompliance with medications x3 days. Pt also took another residents medications (thiamine and folic acid) BIB EMS from [facility] for noncompliance with medications x3 days. Pt also took another resident's medications (thiamine and folic acid)

## 2022-01-25 NOTE — ED PROVIDER NOTE - PATIENT PORTAL LINK FT
You can access the FollowMyHealth Patient Portal offered by [facility] by registering at the following website: [email or web address]. By joining [facility]’s FollowMyHealth portal, you will also be able to view your health information using other applications (apps) compatible with our system.

## 2022-01-25 NOTE — ED ADULT NURSE NOTE - OBJECTIVE STATEMENT
pt BIBEMS from group home for reported noncompliance with medications x 3 days and reported taking of other patients medications (thiamine and folic acid). per pt via , pt denies taking other patients medications and reports taking hers as given. pt denies all complaints. calm and cooperative at this time.

## 2022-02-15 NOTE — BH INPATIENT PSYCHIATRY PROGRESS NOTE - NSBHFUPINTERVALHXFT_PSY_A_CORE
Pt explains he is not using mail order anymore and would like refills sent to [facility].    No overnight events. No PRNs over past 24 hours. Per addendum to yesterday's progress note, patient's medications were changed based on collateral from patient's ACT team. Patient compliant with medications. Patient seen this am by writer and attending in quiet room, history taken with Mandarin  #[id number]. Pt states she feels "very good." Says she is doing "pretty well." Endorses good sleep and appetite. Feels safe on unit. Denies depressed mood, anxiety, SI/HI, AH/VH. Denies pain. Denies feeling restless. Is tolerating medications without side effects. No complaints.

## 2022-05-16 NOTE — BH DISCHARGE NOTE NURSING/SOCIAL WORK/PSYCH REHAB - NSCDUDCCRISIS_PSY_A_CORE
98.7 [facility] Well  1 (532) [facility]-WELL ([phone])  Text "WELL" to 89689  Website: www.eFuneral/.Safe Horizons 1 (808) 401-UHJN (4914) Website: www.safehorizon.org/.National Suicide Prevention Lifeline 6 (255) 891-7780/.  [facility]et  1 (525) LIFENET (770-0113)/.  [facility]’s Behavioral Health Crisis Center  [address] [place] [address] [phone]   Hours:  Monday through Friday from 9 AM to 3 PM/.  U.S. Dept of  Affairs - Veterans Crisis Line  4 (110) 911-6815, Option 1 [facility] Well  1 (348) [facility]-WELL ([phone])  Text "WELL" to 61823  Website: www.91JinRong/.Safe Horizons 1 (982) 740-CGTC (3855) Website: www.safehorizon.org/.National Suicide Prevention Lifeline 7 (036) 387-2972/.  [facility] Behavioral Health Helpline / [facility] Mobile Crisis  (783) 479-UCBZ (0777)/.  [facility] Behavioral Health Crisis Center  [address] [place] [address] [phone]   Hours:  Monday through Friday from 9 AM to 3 PM/.  [place] Dept of [place] - [place] Crisis Line  5 [phone]) 391-9611, Option 1 [facility] Well  1 (207) [facility]-WELL ([phone])  Text "WELL" to 35293  Website: www.Cymax/.Safe Horizons 1 (015) 301-OGJC (9141) Website: www.safehorizon.org/.National Suicide Prevention Lifeline 8 (582) 732-3695/.  Lifenet  1 (190) LIFENET (322-0914)/.  [facility]’s Behavioral Health Crisis Center  [address] [place] [address] [phone]   Hours:  Monday through Friday from 9 AM to 3 PM/.  U.S. Dept of  Affairs - Veterans Crisis Line  2 [phone]) 711-5516, Option 1

## 2022-06-07 NOTE — BH INPATIENT PSYCHIATRY PROGRESS NOTE - NSBHADMITIPREASONDETAILS_PSY_A_CORE FT
Spiritual Plan of Care    Pt Name: Kiersten Blackburn  Pt : 1970  Date: 2022    Visit Type: In person    Referral Source: Interdisciplinary team    Reason for Visit: Advance Directives    Visited With: Patient    Length of Visit: 25 minutes    Requires Follow-up: Yes    Follow-up Date: 22    Follow-up Reason:  (POA Completion)     Spiritual Care Consult Needed: Spiritual Care eval completed    Spiritual Care Visit Preference:     Taxonomy:    · Intended Effects: Convey a calming presence, Demonstrate caring and concern, Helping someone feel comforted, Promote a sense of peace, Aligning care plan with patient's values  · Methods: Encourage story-telling, Encourage sharing of feelings, Offer support  · Interventions: Ask guided questions, Active listening, Acknowledge current situation, Assist patients with documenting choices, Ask questions to bring forth feelings, Facilitate communication, Identify supportive relationship(s)      Patient Affect at Time of Visit: Alert, Expressive  Patient Assessment: Unable to assess  Patient  Intervention: Emotional Support, Empathic Listening    Family/Friend Name: Facundo Duckwotrh     Followed up referral from morning  to assist patient with POAHC/SDM. Patient was quite alert and talkative and told me lots of stories about her family. I was able to get patient's trusted neighbor - Facundo Duckworth on the phone and patient was able to speak with him of the need to have access to her apartment and get out her phone and keys. Only Mr. Duckworth has patient's spare keys but he is 82 years old per patient and might not be able to travel to the hospital. There is still need for the hospital to find someone to go to patient's apartment to get her phone and door keys.    Rev.. Clifford Nava;Ph.D, [facility]  Staff   [facility]  [phone]          
safe dispo planning.

## 2022-06-13 ENCOUNTER — EMERGENCY (EMERGENCY)
Facility: HOSPITAL | Age: 47
LOS: 1 days | Discharge: HOME | End: 2022-06-13
Attending: EMERGENCY MEDICINE | Admitting: EMERGENCY MEDICINE
Payer: MEDICAID

## 2022-06-13 PROCEDURE — 99283 EMERGENCY DEPT VISIT LOW MDM: CPT

## 2022-06-13 PROCEDURE — 99285 EMERGENCY DEPT VISIT HI MDM: CPT

## 2022-06-14 VITALS
RESPIRATION RATE: 18 BRPM | DIASTOLIC BLOOD PRESSURE: 86 MMHG | WEIGHT: 149.91 LBS | HEART RATE: 103 BPM | SYSTOLIC BLOOD PRESSURE: 122 MMHG | HEIGHT: 57.09 IN | OXYGEN SATURATION: 96 % | TEMPERATURE: 98 F

## 2022-06-14 VITALS
HEART RATE: 98 BPM | RESPIRATION RATE: 16 BRPM | TEMPERATURE: 99 F | DIASTOLIC BLOOD PRESSURE: 84 MMHG | SYSTOLIC BLOOD PRESSURE: 124 MMHG | OXYGEN SATURATION: 96 %

## 2022-06-14 NOTE — ED ADULT NURSE NOTE - NSIMPLEMENTINTERV_GEN_ALL_ED
Implemented All Fall Risk Interventions:  [place] to call system. Call bell, personal items and telephone within reach. Instruct patient to call for assistance. Room bathroom lighting operational. Non-slip footwear when patient is off stretcher. Physically safe environment: no spills, clutter or unnecessary equipment. Stretcher in lowest position, wheels locked, appropriate side rails in place. Provide visual cue, wrist band, yellow gown, etc. Monitor gait and stability. Monitor for mental status changes and reorient to person, place, and time. Review medications for side effects contributing to fall risk. Reinforce activity limits and safety measures with patient and family.

## 2022-06-14 NOTE — ED PROVIDER NOTE - OBJECTIVE STATEMENT
pt BIBEMS after PD called them because pt was wondering the streets and PD thought that pt was altered.  attempted to use [facility]  services but only limited translation available as pt is speaking an unusual dialect of mandarin.  now found out that pt is a resident of [facility] and was filed as a missing person today around 8pm.  [facility] contacted and awaiting there .

## 2022-06-14 NOTE — ED ADULT NURSE NOTE - OBJECTIVE STATEMENT
pt BIBEMS after PD called them because pt was wandering the streets and PD thought that pt was altered.  attempted to use [employer]  services but only limited translation available as pt is speaking an unusual dialect of mandarin.  now found out that pt is a resident of [facility] and was filed as a missing person today around 8pm.  [facility] contacted and awaiting there

## 2022-06-14 NOTE — ED ADULT NURSE REASSESSMENT NOTE - NS ED NURSE REASSESS COMMENT FT1
Pt being discharged back to [facility], Legal guardian, Abby Kim at bedside for , vs wnl see flow sheet.  phone used for mandarin translation ID #[id number]. No s/s of distress noted, patient safety maintained.

## 2022-06-14 NOTE — ED PROVIDER NOTE - CLINICAL SUMMARY MEDICAL DECISION MAKING FREE TEXT BOX
pt is resident of [facility] will return there. pt is resident of [facility] will return there.  [facility] to  at 8am. [place]: pt is resident of [facility] will return there.  [facility] to  at 8am.

## 2022-06-14 NOTE — ED PROVIDER NOTE - NSFOLLOWUPINSTRUCTIONS_ED_ALL_ED_FT
-- Follow up with your primary care physician in 48 hours.    -- Return to the ER for worsening or persistent symptoms, and/or ANY NEW OR CONCERNING SYMPTOMS.    -- If you have difficulty following up, please call: [phone] to obtain a [facility] doctor or specialist who takes your insurance in your area.

## 2022-06-14 NOTE — ED ADULT NURSE REASSESSMENT NOTE - NS ED NURSE REASSESS COMMENT FT1
Pt received, asleep resting in stretcher awaiting  and transfer back to living facility ([facility]). No s/s of distress noted, will continue to monitor, patient safety maintained.

## 2022-06-14 NOTE — ED ADULT NURSE REASSESSMENT NOTE - NS ED NURSE REASSESS COMMENT FT1
Other hospital records indicate that patient may be from the [facility]. Writer RN called center- office is closed, but spoke to someone there that is escalating it to his boss. MD made aware. PD updated.

## 2022-06-14 NOTE — ED ADULT TRIAGE NOTE - CHIEF COMPLAINT QUOTE
Pt brought in by EMS after being found wandering and staring off in the [employer] parking lot.  on scene states Pt was wandering and trying to talk to people. Police state they tried to use an .  believed patient may have been confused or speaks a different dialect. Pt denies any complaints of pain or discomfort. Pt does not appear to have any injuries. Does not appear to be in distress.     Hospital  utilized. Pt does not know phone number or address. Pt states her family member was with her at [employer]. At one point during the interpretation, Pt states she takes medication with side effects-2 red pills, 1 white pill. Pt follows direction and is agreeable.

## 2022-06-14 NOTE — ED PROVIDER NOTE - PATIENT PORTAL LINK FT
Patient Seen in: [facility] AND [facility] Emergency Department    History   Patient presents with:  Laceration Abrasion (integumentary)    Stated Complaint: head laceration     HPI    Patient here with complaint of head injury.   Injury occurred PTA when she hit all extremities. Reflexes 2+ in all extremitites. Normal sensation to light touch in all extremities. Cranial Nerves: Cranial nerves 2-12 intact  Cerebellar: Normal gait.  Normal as tested  Resp/CV: no chest tenderness, no resp distres, regular rate  Back You can access the FollowMyHealth Patient Portal offered by [facility] by registering at the following website: [email or web address]. By joining [facility]’s FollowMyHealth portal, you will also be able to view your health information using other applications (apps) compatible with our system.

## 2022-08-14 NOTE — ED BEHAVIORAL HEALTH ASSESSMENT NOTE - ACCESS TO FIREARM
Hypothyroidism
Unable to assess

## 2022-08-30 NOTE — ED BEHAVIORAL HEALTH ASSESSMENT NOTE - PREFERRED LANGUAGE
OT referral received and evaluation administered 8/30/22  Pt currently limited by severe pain with repositioning where croft is inserted, symptomatic orthostatic hypotension, instability on feet and environmental barriers  Pt would benefit from skilled OT to address functional deficits and educate on compensatory strategies, environmental mods, ECTs and safe use of DME/AE although pt uninterested in further skilled OT at this time  He i s content with assist from his girlfriend prn  OT eval only pt preference 
Chinese Mandarin

## 2022-10-06 ENCOUNTER — EMERGENCY (EMERGENCY)
Facility: HOSPITAL | Age: 47
LOS: 1 days | Discharge: HOME | End: 2022-10-06
Attending: EMERGENCY MEDICINE | Admitting: EMERGENCY MEDICINE
Payer: MEDICAID

## 2022-10-06 VITALS
HEIGHT: 56 IN | RESPIRATION RATE: 16 BRPM | DIASTOLIC BLOOD PRESSURE: 83 MMHG | SYSTOLIC BLOOD PRESSURE: 135 MMHG | HEART RATE: 95 BPM | OXYGEN SATURATION: 97 % | TEMPERATURE: 99 F

## 2022-10-06 LAB
ALBUMIN SERPL ELPH-MCNC: 3.9 G/DL — SIGNIFICANT CHANGE UP (ref 3.3–5)
ALP SERPL-CCNC: 85 U/L — SIGNIFICANT CHANGE UP (ref 40–120)
ALT FLD-CCNC: 120 U/L — HIGH (ref 10–45)
ANION GAP SERPL CALC-SCNC: 10 MMOL/L — SIGNIFICANT CHANGE UP (ref 5–17)
APAP SERPL-MCNC: <1 UG/ML — LOW (ref 10–30)
AST SERPL-CCNC: 61 U/L — HIGH (ref 10–40)
BASOPHILS # BLD AUTO: 0.03 K/UL — SIGNIFICANT CHANGE UP (ref 0–0.2)
BASOPHILS NFR BLD AUTO: 0.4 % — SIGNIFICANT CHANGE UP (ref 0–2)
BILIRUB SERPL-MCNC: 1.2 MG/DL — SIGNIFICANT CHANGE UP (ref 0.2–1.2)
BUN SERPL-MCNC: 14 MG/DL — SIGNIFICANT CHANGE UP (ref 7–23)
CALCIUM SERPL-MCNC: 9.2 MG/DL — SIGNIFICANT CHANGE UP (ref 8.4–10.5)
CHLORIDE SERPL-SCNC: 107 MMOL/L — SIGNIFICANT CHANGE UP (ref 96–108)
CO2 SERPL-SCNC: 25 MMOL/L — SIGNIFICANT CHANGE UP (ref 22–31)
CREAT SERPL-MCNC: 0.55 MG/DL — SIGNIFICANT CHANGE UP (ref 0.5–1.3)
EGFR: 113 ML/MIN/1.73M2 — SIGNIFICANT CHANGE UP
EOSINOPHIL # BLD AUTO: 0.06 K/UL — SIGNIFICANT CHANGE UP (ref 0–0.5)
EOSINOPHIL NFR BLD AUTO: 0.8 % — SIGNIFICANT CHANGE UP (ref 0–6)
ETHANOL SERPL-MCNC: <3 MG/DL — SIGNIFICANT CHANGE UP (ref 0–3)
GLUCOSE SERPL-MCNC: 97 MG/DL — SIGNIFICANT CHANGE UP (ref 70–99)
HCG SERPL-ACNC: <1 MIU/ML — SIGNIFICANT CHANGE UP
HCT VFR BLD CALC: 40.4 % — SIGNIFICANT CHANGE UP (ref 34.5–45)
HGB BLD-MCNC: 13.3 G/DL — SIGNIFICANT CHANGE UP (ref 11.5–15.5)
IMM GRANULOCYTES NFR BLD AUTO: 0.4 % — SIGNIFICANT CHANGE UP (ref 0–0.9)
LYMPHOCYTES # BLD AUTO: 1.74 K/UL — SIGNIFICANT CHANGE UP (ref 1–3.3)
LYMPHOCYTES # BLD AUTO: 23.8 % — SIGNIFICANT CHANGE UP (ref 13–44)
MCHC RBC-ENTMCNC: 29.9 PG — SIGNIFICANT CHANGE UP (ref 27–34)
MCHC RBC-ENTMCNC: 32.9 GM/DL — SIGNIFICANT CHANGE UP (ref 32–36)
MCV RBC AUTO: 90.8 FL — SIGNIFICANT CHANGE UP (ref 80–100)
MONOCYTES # BLD AUTO: 0.57 K/UL — SIGNIFICANT CHANGE UP (ref 0–0.9)
MONOCYTES NFR BLD AUTO: 7.8 % — SIGNIFICANT CHANGE UP (ref 2–14)
NEUTROPHILS # BLD AUTO: 4.89 K/UL — SIGNIFICANT CHANGE UP (ref 1.8–7.4)
NEUTROPHILS NFR BLD AUTO: 66.8 % — SIGNIFICANT CHANGE UP (ref 43–77)
NRBC # BLD: 0 /100 WBCS — SIGNIFICANT CHANGE UP (ref 0–0)
PLATELET # BLD AUTO: 256 K/UL — SIGNIFICANT CHANGE UP (ref 150–400)
POTASSIUM SERPL-MCNC: 4 MMOL/L — SIGNIFICANT CHANGE UP (ref 3.5–5.3)
POTASSIUM SERPL-SCNC: 4 MMOL/L — SIGNIFICANT CHANGE UP (ref 3.5–5.3)
PROT SERPL-MCNC: 7.2 G/DL — SIGNIFICANT CHANGE UP (ref 6–8.3)
RBC # BLD: 4.45 M/UL — SIGNIFICANT CHANGE UP (ref 3.8–5.2)
RBC # FLD: 12.6 % — SIGNIFICANT CHANGE UP (ref 10.3–14.5)
SALICYLATES SERPL-MCNC: <0.2 MG/DL — LOW (ref 3–30)
SARS-COV-2 RNA SPEC QL NAA+PROBE: SIGNIFICANT CHANGE UP
SODIUM SERPL-SCNC: 142 MMOL/L — SIGNIFICANT CHANGE UP (ref 135–145)
WBC # BLD: 7.32 K/UL — SIGNIFICANT CHANGE UP (ref 3.8–10.5)
WBC # FLD AUTO: 7.32 K/UL — SIGNIFICANT CHANGE UP (ref 3.8–10.5)

## 2022-10-06 PROCEDURE — 80307 DRUG TEST PRSMV CHEM ANLYZR: CPT

## 2022-10-06 PROCEDURE — 80053 COMPREHEN METABOLIC PANEL: CPT

## 2022-10-06 PROCEDURE — 84702 CHORIONIC GONADOTROPIN TEST: CPT

## 2022-10-06 PROCEDURE — 87635 SARS-COV-2 COVID-19 AMP PRB: CPT

## 2022-10-06 PROCEDURE — 90792 PSYCH DIAG EVAL W/MED SRVCS: CPT | Mod: 95

## 2022-10-06 PROCEDURE — 93005 ELECTROCARDIOGRAM TRACING: CPT

## 2022-10-06 PROCEDURE — 93010 ELECTROCARDIOGRAM REPORT: CPT

## 2022-10-06 PROCEDURE — 99285 EMERGENCY DEPT VISIT HI MDM: CPT

## 2022-10-06 PROCEDURE — 85025 COMPLETE CBC W/AUTO DIFF WBC: CPT

## 2022-10-06 PROCEDURE — 36415 COLL VENOUS BLD VENIPUNCTURE: CPT

## 2022-10-06 RX ORDER — OLANZAPINE 15 MG/1
20 TABLET, FILM COATED ORAL ONCE
Refills: 0 | Status: COMPLETED | OUTPATIENT
Start: 2022-10-06 | End: 2022-10-06

## 2022-10-06 RX ORDER — CLONAZEPAM 1 MG
0.5 TABLET ORAL ONCE
Refills: 0 | Status: DISCONTINUED | OUTPATIENT
Start: 2022-10-06 | End: 2022-10-06

## 2022-10-06 RX ADMIN — Medication 0.5 MILLIGRAM(S): at 18:45

## 2022-10-06 RX ADMIN — OLANZAPINE 20 MILLIGRAM(S): 15 TABLET, FILM COATED ORAL at 18:44

## 2022-10-06 NOTE — ED BEHAVIORAL HEALTH NOTE - BEHAVIORAL HEALTH NOTE
===================  PRE-HOSPITAL COURSE  ===================  SOURCE: ED RN and secondhand documentation   DETAILS:  Patient BIBEMS from [facility] for aggression and refusal to take medications at group home.      ============  ED COURSE   ============  SOURCE: ED RN and secondhand documentation  BELONGINGS:  No belongings of note. All belongings were provided to hospital security, and patient currently in a gown with a 1:1 staff member.  BEHAVIOR: Patient is Mandarin speaking, calm and cooperative in the ED. As per group home, patient was agitated and aggressive- trying to scratch group home staff which is why EMS was activated. Patient remains in good behavioral and impulse control, is not currently violent/aggressive. Patient denying HI/SI/VH/AH.   TREATMENT: Patient did not require PRNs in ED.    VISITORS: None at bedside.

## 2022-10-06 NOTE — ED BEHAVIORAL HEALTH ASSESSMENT NOTE - HPI (INCLUDE ILLNESS QUALITY, SEVERITY, DURATION, TIMING, CONTEXT, MODIFYING FACTORS, ASSOCIATED SIGNS AND SYMPTOMS)
This is a 46 year old single, unemployed female, Mandarin-speaking, domiciled at [facility], with past psychiatric history of Intellectual disability and Schizoaffective disorder vs Schizophrenia, multiple hospitalizations (including a 440-day hospitalization from 4/2016-6/2017, most recently hospitalized from 2/21-3/9/21 for psychosis at [facility]; hospitalized in 1/2021 prior to that), followed by [facility], unclear chart history of possible suicide attempt, chronic history of medication compliance issues with history of aggression when medication non-compliant, no substance use, no legal history and past medical history of allergic rhinitis, GERD and cardiomyopathy who presents to the ED BIB EMS activated by her residence for agitation and medication non-compliance. She is agitated with staff and uncooperative on arrival requiring IM Ativan for chemical sedation. She is otherwise unresponsive with ED provider. Psychiatry consulted for evaluation.     On assessment, patient waves hello in response to this writer and exclaims "good" when asked how she is doing in English. With use of mandarin  services, she relays "I'm feeling very well" and relays being in the hospital on orientation questions. She relays that she is hard of hearing and it is initially difficult for the  to hear her responses. She eventually says she is in the hospital because she "got a cold." Initial attempt at ROS is limited as  indicates her responses are nonsensical.  confirms with patient whether she speaks mandarin and patient affirms that she does. With further encouragement, she begins responding sensibly relaying that her sleep has been "good" but then states "I do not understand" when asked when she last ate. She says she has been taking her medications on time and refutes the staff's claims of medication non-compliance. She is told she will be offered her medications here and she asks if it will be something the doctor prescribes. She is reassured it will be the same mediations she was receiving when last hospitalized and she asks why it would be the same medications then says this writer is not a doctor. Despite reassurance and displayed identification, she repeatedly states not wanting this writer to prescribe her medicines. She stops responding to further questions.     COVID Exposure Screen- Patient  Have you had a COVID-19 test in the last 90 days? Unable to assess   Have you tested positive for COVID-19 antibodies? Unable to assess   Have you received 2 doses of the COVID-19 vaccine? Unable to assess   In the past 10 days, have you been around anyone with a positive COVID-19 Unable to assess  Have you been out of [place] within the past 10 days? Unable to assess     Collateral from [facility] ([phone]) is as such:   Collateral relays that his colleague reported to him today that patient was screaming incoherently upon staff's arrival to the residence. An  could not make sense of what patient was saying. As patient was calmed by staff, patient then became unresponsive, making no eye contact, was non-verbal, briefly nodded her head in response to wanting to go somewhere as she was holding onto her coat but would not elaborate. Patient later resumed screaming incoherently. She appeared internally preoccupied. She then tried to run out of the building and staff intervened and redirected her. She has been at this residence for over a year now and collateral confirms the name and number as Cody Johnson ([phone]). Collateral relays that medication compliance has been an issue for the patient and she has had prior instances of running out into the street impulsively without regard for her safety. She has refused all her medications for the last 3 days and so staff felt she should go to the hospital for a psychiatric evaluation. Collateral is unaware of her sleeping and eating habits otherwise. Collateral confirms that she was discharged from [facility] on 3/10 and notes that since discharge, patient has had intermittent poor communication relaying that patient is a poor communicator at baseline and often refuses to speak with an  but then will speak without it. Her intellectual understanding is limited and she fails to grasp some basic concepts. Collateral notes, for example, that patient has a portable interpretation device but doesn't know how use it and doesn't retain the education on how to use it. Collateral relays that the residence may have more information about patient's recent behaviors / functioning otherwise.     COVID Exposure Screen- Collateral  Has the patient had a COVID-19 test in the last 90 days? Yes; Negative tests prior to recent hospitalization  Has the patient tested positive for COVID-19 antibodies? Collateral does not believe so; though notes he is unaware of what occurred within the hospital admissions in regards to antibody testing  Has the patient received 2 doses of the COVID-19 vaccine? Kem does not believe so; he relays that staff have had difficulty educating patient and getting her to consent to the vaccine   In the past 10 days, has the patient been around anyone with a positive COVID-19 test? No  Has the patient been out of [place] within the past 10 days? No This is a 46 year old single, unemployed female, Mandarin-speaking, domiciled at [facility], with past psychiatric history of Intellectual disability and Schizoaffective disorder vs Schizophrenia, multiple hospitalizations (including a 440-day hospitalization from 4/2016-6/2017, most recently hospitalized from 2/21-3/9/21 for psychosis at [facility]; hospitalized in 1/2021 prior to that), followed by [facility], unclear chart history of possible suicide attempt, chronic history of medication compliance issues with history of aggression when medication non-compliant, no substance use, no legal history and past medical history of allergic rhinitis, GERD and cardiomyopathy who presents to the ED BIB EMS activated by her residence for agitation and medication non-compliance. She is agitated with staff and uncooperative on arrival requiring IM Ativan for chemical sedation. She is otherwise unresponsive with ED provider. Psychiatry consulted for evaluation. 47 year old single, unemployed female, Mandarin-speaking, domiciled at [facility], with past psychiatric history of Intellectual disability and Schizoaffective disorder vs Schizophrenia, multiple hospitalizations (including a 440-day hospitalization from 4/2016-6/2017, most recently hospitalized from 2/21-3/9/21 for psychosis at [facility]; hospitalized in 1/2021 prior to that), followed by [facility], unclear chart history of possible suicide attempt, chronic history of medication compliance issues with history of aggression when medication non-compliant, no substance use, no legal history and past medical history of allergic rhinitis, GERD and cardiomyopathy who presents to the ED BIB EMS activated by her residence for agitation and medication non-compliance. She is agitated with staff and uncooperative on arrival requiring IM Ativan for chemical sedation. She is otherwise unresponsive with ED provider. Psychiatry consulted for evaluation.    [facility] admission x1 month (2/5-3/10/21) 47 year old single Mandarin-speaking female, unemployed, domiciled at [facility] group home; pmhx allergic rhinitis, GERD and cardiomyopathy; past psychiatric history of Intellectual disability and schizoaffective disorder vs Schizophrenia, multiple hospitalizations (including a 440-day hospitalization from 4/2016-6/2017, most recently hospitalized from 2/21-3/9/21 for psychosis at [facility]; hospitalized in 1/2021 prior to that), followed by [facility], unclear chart history of possible suicide attempt, chronic history of medication compliance issues with history of aggression when medication non-compliant, no substance use, no legal history who presents to the ED BIB EMS activated by her residence for agitation and medication non-compliance. She is agitated with staff and uncooperative on arrival requiring IM Ativan for chemical sedation. She is otherwise unresponsive with ED provider. Psychiatry consulted for evaluation.    [facility] admission x1 month (2/5-3/10/21) 47 year old single Mandarin-speaking female, unemployed, domiciled at [facility] group home; pmhx allergic rhinitis, GERD and cardiomyopathy; past psychiatric history of Intellectual disability and schizoaffective disorder vs schizophrenia, multiple hospitalizations (including a 440-day hospitalization from 4/2016-6/2017, most recently hospitalized from 2/21-3/9/21 for psychosis at [facility]; hospitalized in 1/2021 prior to that), followed by [facility], unclear chart history/possible suicide attempt, chronic history of medication compliance issues with hx aggression when medication non-compliant; no substance use; no legal history; BIBA activated by residence for agitation in setting of medication non-compliance for one week.     She is agitated with staff and uncooperative on arrival requiring IM Ativan for chemical sedation. She is otherwise unresponsive with ED provider. Psychiatry consulted for evaluation.    [facility] admission x1 month (2/5-3/10/21)

## 2022-10-06 NOTE — ED BEHAVIORAL HEALTH ASSESSMENT NOTE - NSPRESENTSXS_PSY_ALL_CORE
Psychosis/Impulsivity/Severe anxiety, agitation or panic/Refusal or inability to complete safety plan Psychosis/Impulsivity/Severe anxiety, agitation or panic

## 2022-10-06 NOTE — ED BEHAVIORAL HEALTH ASSESSMENT NOTE - RISK ASSESSMENT
Risks: mood/psychotic disorder hx; chronic compliance issues, aggression when medication non-adherent, conduct issues, intellectual disability with poor frustration tolerance and impulsivity, unclear history of SA/NSSI and patient lacking insight, unable to engage in safety/treatment planning. Protective factors: [facility] connected, structured residence, sobriety, supportive treatment/housing network. Low Acute Suicide Risk Chronic risks: mood/psychotic disorder hx; chronic compliance issues, aggression when medication non-adherent, hx conduct issues, intellectual disability with poor frustration tolerance and impulsivity, unclear history? of SA/NSSI, chronic lack of insight, not able to meaningfully engage in safety/treatment planning. Protective factors: [facility] connected, structured residence, sobriety, supportive treatment/housing network.

## 2022-10-06 NOTE — ED BEHAVIORAL HEALTH ASSESSMENT NOTE - DETAILS
pt reports having a cold but unable to elaborate further symptoms Message left with Dr. Serrato (discharging MD during [facility] admission) at [phone] requesting callback [facility] admission x1 month (2/5-3/10/21) Unable to assess hx of aggression in setting of med noncompliance in past per chart and upon arrival to ED hx of aggression in setting of med noncompliance in past per chart denies SI spoke with residence pt not able to meaningfully engage with safety planning

## 2022-10-06 NOTE — ED BEHAVIORAL HEALTH ASSESSMENT NOTE - PAST PSYCHOTROPIC MEDICATION
Per chart review; has been on Prozac, Seroquel, Invega Sustenna, Risperidone and Risperdal Consta before Per chart review, has been on Prozac, Seroquel, Invega Sustenna, Risperidone, Risperdal Consta Per chart review, has been on Prozac, Seroquel, Invega Sustenna, Risperidone, Risperdal Consta, depakote

## 2022-10-06 NOTE — ED BEHAVIORAL HEALTH ASSESSMENT NOTE - CURRENT MEDICATION
Medication list as of 3/9/21: Klonopin 0.5 mg daily and 1 mg HS, Invega 3 mg HS, Cogentin 0.5 mg BID, Depakote 750 mg BID, Zyprexa 20 mg HS, and Invega Trinza 819 mg q3 months (was due on 3/15/21; unclear if received this) Medication list as of 3/9/21: Klonopin 0.5 mg AM and 1 mg PM, Cogentin 1 mg AM, Zyprexa 20 mg HS, Invega Trinza 819 mg q3 months

## 2022-10-06 NOTE — ED PROVIDER NOTE - NSFOLLOWUPINSTRUCTIONS_ED_ALL_ED_FT
Managing Schizophrenia      If you have been diagnosed with schizophrenia, you may be relieved to know why you have felt or behaved a certain way. Still, you may have questions about the treatment ahead, how to get the support you need, and how to deal with the condition day-to-day. With care and support, you can learn to manage your symptoms and live with schizophrenia.      How to manage lifestyle changes      Managing stress      Stress is your body's reaction to life changes and events, both good and bad. For people with schizophrenia, stress can sometimes cause an episode to start (can be a trigger), so it is important to learn ways to manage stress. Your health care provider, therapist, or counselor may suggest some techniques such as:  •Meditation, muscle relaxation, and breathing exercises.      •Music therapy. This can include creating music or listening to music.      •Life skills training. This training is focused on work, self-care, money, house management, and social skills.      Other things you can do to manage stress include:  •Keeping a stress diary. This can help you learn what causes your stress to start and how you can control your response to those triggers.      •Exercising. Even a short daily walk can help.      •Getting enough sleep.      •Making a schedule to manage your time. Knowing what you will do from day to day helps you avoid feeling overwhelmed by tasks and deadlines.      •Spending time on hobbies you enjoy that help you relax.      Medicines     Antipsychotic medicines are usually prescribed to help manage this condition. Make sure you:  •Talk with your pharmacist or health care provider about all medicines that you take, the possible side effects, and which medicines are safe to take together.      •Make it your goal to take part in all treatment decisions (shared decision-making). Ask about possible side effects of medicines that your health care provider recommends, and tell him or her how you feel about having those side effects. It is best if shared decision-making with your health care provider is part of your total treatment plan.      Relationships    Having the support of your family and friends can play a major role in the success of your treatment. The following steps can help you maintain healthy relationships:  •Think about going to couples therapy, family therapy, or family education classes.      •Create a written plan for your treatment, and include close family members and friends in the process.      •Consider bringing your partner or another family member or friend to the appointments you have with your health care provider.        How to recognize changes in your condition    If you find that your condition is getting worse, talk to your health care provider right away. Watch for these signs:  •You hear, see, taste, and things that others do not (hallucinate).      •You cannot set aside persistent, unusual thoughts or beliefs, no matter what others may believe.      •Your speech becomes unclear.      •You withdraw from friends and family members.       •You have racing thoughts and have trouble thinking clearly or staying focused.      •You have poor personal hygiene, weight gain or weight loss, or changes in how you are sleeping or eating.        Follow these instructions at home:    •Take over-the-counter and prescription medicines only as told by your health care provider. Do not start new medicines or stop taking medicines before you ask your health care provider if it is safe to make those changes.      •Avoid caffeine, alcohol, and drugs. They can affect how your medicine works and can make your symptoms worse.      •Eat a healthy diet.      •Look for support groups in your area so you can meet other people with your condition. You will learn different methods that others have used to manage schizophrenia.      •Keep all follow-up visits as told by your health care provider, therapist, or counselor. This is important.        Where to find support    Talking to others     •Reach out to trusted friends or family members, explain your condition, and let them know that you are working with a health care team.      •Consider giving educational materials to friends and family.      •If you are having trouble telling your friends and family about your condition, keep in mind that honest and open communication can make these conversations easier.      Finances     Be sure to check with your insurance carrier to find out what treatment options are covered by your plan. You may also be able to find financial assistance through not-for-profit organizations or with local government-based resources.    If you are taking medicines, you may be able to get the generic form, which may be less expensive than brand-name medicine. Some makers of prescription medicines also offer help to patients who cannot afford the medicines that they need.    Therapy and support groups     •Make sure you find a counselor or therapist who is familiar with schizophrenia. Meet with your counselor or therapist once a week or more often if needed.      •Find support programs for people with schizophrenia.        Where to find more information    National [place] on Mental Illness: www.jennifer.org      Contact a health care provider if:    •You are not able to take your medicines as prescribed.      •Your symptoms get worse.        Get help right away if:    •You have serious thoughts about hurting yourself or others.      If you ever feel like you may hurt yourself or others, or have thoughts about taking your own life, get help right away. You can go to your nearest emergency department or call:   • Your local emergency services (911 in the [place]       • A suicide crisis helpline, such as the National Suicide Prevention Lifeline at 1-257.186.9613 or 031 in the [place] This is open 24 hours a day.         Summary    •Schizophrenia is a lifelong illness. It is best controlled with continuous treatment that includes medicine and therapy.      •Learning ways to deal with stress may help your treatment work better.      •Having the support of your family and friends can help to make your treatment a success.      •If you find that your condition is getting worse, talk to your health care provider right away.      This information is not intended to replace advice given to you by your health care provider. Make sure you discuss any questions you have with your health care provider.

## 2022-10-06 NOTE — ED BEHAVIORAL HEALTH ASSESSMENT NOTE - ADDITIONAL DETAILS ALL
chart history unclear on prior suicide attempts / self injury chart history unclear on prior suicide attempts / self injury but pt denies currently

## 2022-10-06 NOTE — ED BEHAVIORAL HEALTH ASSESSMENT NOTE - DESCRIPTION
per chart review; reportedly abandoned by family, pt has her own power of  (as of 1/2020) as per HPI PRE-HOSPITAL COURSE  SOURCE: Triage documentation  DETAILS: pt refusing to speak to . Sent by group home for psych eval and refusing to take her meds x 3 days.    ED COURSE  SOURCE: RN, ED provider and CO documentation / reports  ARRIVAL: EMS activated by   BEHAVIOR: On arrival; Pt is awake, confused, agitated, hearing voices and attempting to hit staff. Pt placed on 1:1 for safety will continue to monitor pt safety maintained. ED provider relays that patient speaks mandarin but has been refusing to talk even with  assistance. She has been agitated on exam, pulled her IV out, was not cooperating with triage  process and required PRN medications. She was subsequently noted to be calm in her stretcher but appeared internally preoccupied.    TREATMENT: Ativan 2 mg IM given at 1709  VISITORS: None see bt[facility] note see [facility] note    Spoke with [facility] nurse Hakeem [phone]/work [phone].    Spoke with [facility]. States pt has been refusing medications for past week; when this happens, tends to act erratically, aggressive - grabbing staff arm for example, clawing gestures at nurse. gets angry more often, refuses to communicate, "talks in gibberish." At baseline and when taking medications, she is pleasant, happy to speak with people, smiling, engaging in activities/conversations. Then gets to the point of "I'm cured and don't need medication anymore."    Has not lived with parents in several years. allergic rhinitis, GERD and cardiomyopathy allergic rhinitis, GERD, cardiomyopathy per chart review pt has her own power of  (as of 1/2020)

## 2022-10-06 NOTE — ED PROVIDER NOTE - CLINICAL SUMMARY MEDICAL DECISION MAKING FREE TEXT BOX
pt presenting from group home for psych eval due to non compliance with meds and aggression towards staff. pt denies any complaints, denies being aggressive, relates she just doesn't want to take medicine.  plan - ekg/labs/telepsych pt presenting from group home for psych eval due to non compliance with meds and aggression towards staff. pt denies any complaints, denies being aggressive, relates she just doesn't want to take medicine.  plan - ekg/labs/telepsych     Tonya: Recd sign out from Dr. Borjas, per telepsych if pt can tolerate po meds she can go back to the facility. Pt took her medications fine. spoke with telepsych, pt may be dc'ed home. RN to call group home for Dc.

## 2022-10-06 NOTE — ED BEHAVIORAL HEALTH ASSESSMENT NOTE - NSBHMSESPABN_PSY_A_CORE
selective speech production/Soft volume/Decreased productivity Soft volume/Slowed rate/Decreased productivity

## 2022-10-06 NOTE — ED PROVIDER NOTE - OBJECTIVE STATEMENT
xlator #[id number]  pt presenting from group home for non compliance with meds and aggression towards staff. pt denies any complaints, denies being aggressive, relates she just doesn't want to take medicine.  denise silver

## 2022-10-06 NOTE — ED BEHAVIORAL HEALTH ASSESSMENT NOTE - SUMMARY
47 year old single Mandarin-speaking female, unemployed, domiciled at [facility] group home; pmhx allergic rhinitis, GERD and cardiomyopathy; past psychiatric history of Intellectual disability and schizoaffective disorder vs schizophrenia, multiple hospitalizations (including a 440-day hospitalization from 4/2016-6/2017, most recently hospitalized from 2/21-3/9/21 for psychosis at [facility]; hospitalized in 1/2021 prior to that), followed by [facility], unclear chart history/possible suicide attempt, chronic history of medication compliance issues with hx aggression when medication non-compliant; no substance use; no legal history; BIBA activated by residence for agitation in setting of medication non-compliance for one week. Pt appears to be cooperative, limited historian/baseline, denying SI/HI/AVH or other acute concerns. She does agree to taking PO meds olanzapine and klonopin in the ED (these are her outpt meds) - does not demonstrate a need for escalation of care at this time. Collateral from residence is not concerning for imminent safety risk.

## 2022-10-06 NOTE — ED ADULT TRIAGE NOTE - CHIEF COMPLAINT QUOTE
Sent in by group home for psych eval. Pt has been aggressive towards staff and is non compliant with her medication.

## 2022-10-06 NOTE — ED BEHAVIORAL HEALTH ASSESSMENT NOTE - OTHER
Unable to assess [place] hub [facility], [facility] Inpatient, [facility] CL, Alpha ED, Alpha Inpatient, Alpha CL, HIE Outpatient Medical, HIE Outpatient BH, HIE ED, Healthix, CVM Inpatient, CVM Outpatient, Tier Inpatient, Tier E&A, Meditech Inpatient, Meditech ED, Quick Docs, Soarian ED, Soarian CL, Psyckes, One Content Inpatient, One Content CL, [facility], Social Media (For example - Facebook, [employer], [employer]), Web search, Forensic Databases impoverished selectively cooperative/uncooperative; oppositional; regressed selective "good" [facility] minimizing, dismissive, ?suspicious limited/baseline "I'm feeling good!" deferred little thought elaboration

## 2022-10-06 NOTE — ED BEHAVIORAL HEALTH ASSESSMENT NOTE - OTHER PAST PSYCHIATRIC HISTORY (INCLUDE DETAILS REGARDING ONSET, COURSE OF ILLNESS, INPATIENT/OUTPATIENT TREATMENT)
As per HPI  Additional chart review from 2/21 documentation is as such:  AOT order from 6/28/2019 to 6/28/2020  ACT team: Merrill Burgos.   Inpatient Hosp: [facility] 1/2016, [place] 2/2016 (66 days), [facility] 4/2016 (433 days), [facility] 1/2020 (27 days); [facility] 12/17/2020-1/14/2021 (28 days). Inpatient Hosp: [facility] 1/2016, [place] 2/2016 (66 days), [facility] 4/2016 (433 days), [facility] 1/2020 (27 days); [facility] 12/17/2020-1/14/2021 (28 days).

## 2022-10-06 NOTE — ED ADULT NURSE NOTE - OBJECTIVE STATEMENT
Pt sent in from [facility] for aggression and noncompliance with meds. As per the [facility], pt has been refusing all her medication, and this morning pt became aggressive and attempted to scratch staff. Pt with hx schizophrenia and schizoaffective disorder. Pt denies any complaints. Pt denies SI/HI. Pt alert and calm on arrival.

## 2022-10-06 NOTE — ED ADULT TRIAGE NOTE - BP NONINVASIVE SYSTOLIC (MM HG)
135
Render Post-Care Instructions In Note?: no
Detail Level: Detailed
Duration Of Freeze Thaw-Cycle (Seconds): 0
Post-Care Instructions: I reviewed with the patient in detail post-care instructions. Patient is to wear sunprotection, and avoid picking at any of the treated lesions. Pt may apply Vaseline to crusted or scabbing areas.
Consent: The patient's consent was obtained including but not limited to risks of crusting, scabbing, blistering, scarring, darker or lighter pigmentary change, recurrence, incomplete removal and infection.

## 2022-10-06 NOTE — ED PROVIDER NOTE - PATIENT PORTAL LINK FT
You can access the FollowMyHealth Patient Portal offered by [facility] by registering at the following website: [email or web address]. By joining [employer]’s FollowMyHealth portal, you will also be able to view your health information using other applications (apps) compatible with our system.

## 2022-10-25 ENCOUNTER — EMERGENCY (EMERGENCY)
Facility: HOSPITAL | Age: 47
LOS: 1 days | Discharge: HOME | End: 2022-10-25
Attending: EMERGENCY MEDICINE | Admitting: EMERGENCY MEDICINE
Payer: MEDICAID

## 2022-10-25 VITALS
TEMPERATURE: 99 F | HEIGHT: 56 IN | HEART RATE: 102 BPM | OXYGEN SATURATION: 95 % | RESPIRATION RATE: 18 BRPM | DIASTOLIC BLOOD PRESSURE: 80 MMHG | WEIGHT: 169.98 LBS | SYSTOLIC BLOOD PRESSURE: 124 MMHG

## 2022-10-25 LAB
ALBUMIN SERPL ELPH-MCNC: 3.3 G/DL — SIGNIFICANT CHANGE UP (ref 3.3–5)
ALP SERPL-CCNC: 103 U/L — SIGNIFICANT CHANGE UP (ref 40–120)
ALT FLD-CCNC: 208 U/L — HIGH (ref 10–45)
ANION GAP SERPL CALC-SCNC: 8 MMOL/L — SIGNIFICANT CHANGE UP (ref 5–17)
AST SERPL-CCNC: 50 U/L — HIGH (ref 10–40)
BASOPHILS # BLD AUTO: 0.03 K/UL — SIGNIFICANT CHANGE UP (ref 0–0.2)
BASOPHILS NFR BLD AUTO: 0.4 % — SIGNIFICANT CHANGE UP (ref 0–2)
BILIRUB SERPL-MCNC: 0.4 MG/DL — SIGNIFICANT CHANGE UP (ref 0.2–1.2)
BUN SERPL-MCNC: 14 MG/DL — SIGNIFICANT CHANGE UP (ref 7–23)
CALCIUM SERPL-MCNC: 9 MG/DL — SIGNIFICANT CHANGE UP (ref 8.4–10.5)
CHLORIDE SERPL-SCNC: 110 MMOL/L — HIGH (ref 96–108)
CO2 SERPL-SCNC: 28 MMOL/L — SIGNIFICANT CHANGE UP (ref 22–31)
CREAT SERPL-MCNC: 0.58 MG/DL — SIGNIFICANT CHANGE UP (ref 0.5–1.3)
D DIMER BLD IA.RAPID-MCNC: 335 NG/ML DDU — HIGH
EGFR: 111 ML/MIN/1.73M2 — SIGNIFICANT CHANGE UP
EOSINOPHIL # BLD AUTO: 0.16 K/UL — SIGNIFICANT CHANGE UP (ref 0–0.5)
EOSINOPHIL NFR BLD AUTO: 2 % — SIGNIFICANT CHANGE UP (ref 0–6)
GLUCOSE SERPL-MCNC: 226 MG/DL — HIGH (ref 70–99)
HCT VFR BLD CALC: 39.4 % — SIGNIFICANT CHANGE UP (ref 34.5–45)
HGB BLD-MCNC: 12.5 G/DL — SIGNIFICANT CHANGE UP (ref 11.5–15.5)
IMM GRANULOCYTES NFR BLD AUTO: 2.4 % — HIGH (ref 0–0.9)
LIDOCAIN IGE QN: 141 U/L — SIGNIFICANT CHANGE UP (ref 73–393)
LYMPHOCYTES # BLD AUTO: 2.78 K/UL — SIGNIFICANT CHANGE UP (ref 1–3.3)
LYMPHOCYTES # BLD AUTO: 35.1 % — SIGNIFICANT CHANGE UP (ref 13–44)
MAGNESIUM SERPL-MCNC: 2.1 MG/DL — SIGNIFICANT CHANGE UP (ref 1.6–2.6)
MCHC RBC-ENTMCNC: 29.1 PG — SIGNIFICANT CHANGE UP (ref 27–34)
MCHC RBC-ENTMCNC: 31.7 GM/DL — LOW (ref 32–36)
MCV RBC AUTO: 91.6 FL — SIGNIFICANT CHANGE UP (ref 80–100)
MONOCYTES # BLD AUTO: 0.61 K/UL — SIGNIFICANT CHANGE UP (ref 0–0.9)
MONOCYTES NFR BLD AUTO: 7.7 % — SIGNIFICANT CHANGE UP (ref 2–14)
NEUTROPHILS # BLD AUTO: 4.15 K/UL — SIGNIFICANT CHANGE UP (ref 1.8–7.4)
NEUTROPHILS NFR BLD AUTO: 52.4 % — SIGNIFICANT CHANGE UP (ref 43–77)
NRBC # BLD: 0 /100 WBCS — SIGNIFICANT CHANGE UP (ref 0–0)
NT-PROBNP SERPL-SCNC: 28 PG/ML — SIGNIFICANT CHANGE UP (ref 0–300)
PLATELET # BLD AUTO: 247 K/UL — SIGNIFICANT CHANGE UP (ref 150–400)
POTASSIUM SERPL-MCNC: 4.3 MMOL/L — SIGNIFICANT CHANGE UP (ref 3.5–5.3)
POTASSIUM SERPL-SCNC: 4.3 MMOL/L — SIGNIFICANT CHANGE UP (ref 3.5–5.3)
PROT SERPL-MCNC: 6.9 G/DL — SIGNIFICANT CHANGE UP (ref 6–8.3)
RBC # BLD: 4.3 M/UL — SIGNIFICANT CHANGE UP (ref 3.8–5.2)
RBC # FLD: 12.6 % — SIGNIFICANT CHANGE UP (ref 10.3–14.5)
SODIUM SERPL-SCNC: 146 MMOL/L — HIGH (ref 135–145)
TROPONIN I, HIGH SENSITIVITY RESULT: 5.2 NG/L — SIGNIFICANT CHANGE UP
WBC # BLD: 7.89 K/UL — SIGNIFICANT CHANGE UP (ref 3.8–10.5)
WBC # FLD AUTO: 7.89 K/UL — SIGNIFICANT CHANGE UP (ref 3.8–10.5)

## 2022-10-25 PROCEDURE — 99285 EMERGENCY DEPT VISIT HI MDM: CPT

## 2022-10-25 PROCEDURE — 71045 X-RAY EXAM CHEST 1 VIEW: CPT | Mod: 26

## 2022-10-25 PROCEDURE — 93010 ELECTROCARDIOGRAM REPORT: CPT

## 2022-10-25 RX ORDER — SODIUM CHLORIDE 9 MG/ML
1000 INJECTION INTRAMUSCULAR; INTRAVENOUS; SUBCUTANEOUS ONCE
Refills: 0 | Status: DISCONTINUED | OUTPATIENT
Start: 2022-10-25 | End: 2022-10-29

## 2022-10-25 NOTE — ED ADULT TRIAGE NOTE - CHIEF COMPLAINT QUOTE
Pt brought in by EMS c/o chest pain as per facility. Pt speaks a dialect of mandarin or cantonese--EMS is unsure. Pt has covid as per EMS.

## 2022-10-26 VITALS
OXYGEN SATURATION: 98 % | SYSTOLIC BLOOD PRESSURE: 112 MMHG | RESPIRATION RATE: 17 BRPM | HEART RATE: 96 BPM | DIASTOLIC BLOOD PRESSURE: 75 MMHG

## 2022-10-26 LAB
HCG SERPL-ACNC: <1 MIU/ML — SIGNIFICANT CHANGE UP
RAPID RVP RESULT: SIGNIFICANT CHANGE UP
SARS-COV-2 RNA SPEC QL NAA+PROBE: DETECTED

## 2022-10-26 PROCEDURE — 71045 X-RAY EXAM CHEST 1 VIEW: CPT

## 2022-10-26 PROCEDURE — 84702 CHORIONIC GONADOTROPIN TEST: CPT

## 2022-10-26 PROCEDURE — 36415 COLL VENOUS BLD VENIPUNCTURE: CPT

## 2022-10-26 PROCEDURE — 71275 CT ANGIOGRAPHY CHEST: CPT | Mod: 26,MA

## 2022-10-26 PROCEDURE — 93005 ELECTROCARDIOGRAM TRACING: CPT

## 2022-10-26 PROCEDURE — 83880 ASSAY OF NATRIURETIC PEPTIDE: CPT

## 2022-10-26 PROCEDURE — G0480: CPT

## 2022-10-26 PROCEDURE — 83690 ASSAY OF LIPASE: CPT

## 2022-10-26 PROCEDURE — 71275 CT ANGIOGRAPHY CHEST: CPT | Mod: MA

## 2022-10-26 PROCEDURE — 84484 ASSAY OF TROPONIN QUANT: CPT

## 2022-10-26 PROCEDURE — 83735 ASSAY OF MAGNESIUM: CPT

## 2022-10-26 PROCEDURE — 0225U NFCT DS DNA&RNA 21 SARSCOV2: CPT

## 2022-10-26 PROCEDURE — 80053 COMPREHEN METABOLIC PANEL: CPT

## 2022-10-26 PROCEDURE — 99285 EMERGENCY DEPT VISIT HI MDM: CPT

## 2022-10-26 PROCEDURE — 85025 COMPLETE CBC W/AUTO DIFF WBC: CPT

## 2022-10-26 PROCEDURE — 85379 FIBRIN DEGRADATION QUANT: CPT

## 2022-10-26 NOTE — ED PROVIDER NOTE - CARE PROVIDER_API CALL
Fabiano Conteh)  Cardiovascular Disease; Internal Medicine  [address], [address]  [place] [address]  Phone: [phone]  Fax: ()-  Follow Up Time: 1-3 Days

## 2022-10-26 NOTE — ED ADULT NURSE NOTE - OBJECTIVE STATEMENT
Pt is awake, speaks Mandarin who was brought to the ER due to chest pain. Pt is a known Covid  posituve . No SOB noted.. Pt not a good Historian even with  Service as per Dr STEPAN Enrique.

## 2022-10-26 NOTE — ED PROVIDER NOTE - PATIENT PORTAL LINK FT
You can access the FollowMyHealth Patient Portal offered by [facility] by registering at the following website: [email or web address]. By joining [employer]’s FollowMyHealth portal, you will also be able to view your health information using other applications (apps) compatible with our system.

## 2022-10-26 NOTE — ED ADULT NURSE NOTE - NSFALLRSKINDICATORS_ED_ALL_ED
ASPIRIN and NSAID PRODUCTS    The following is a list of medications that either contain aspirin or nonsteroidal anti-inflammatory agents (NSAID's). Please do not take these medications.    OVER-THE-COUNTER:  Do not take five (5) days prior to procedure.  Do not take for two (2) weeks after procedure.  · Aspirin (generic):  Brand Names:  Anacin, Pancho, Herrin, Aspergum, Aspercin, Aspermin, Aspertab, Back-quell, Duradyne, Empirin, Gemnisyn, Genprin, Gensan, Magnaprin, McNess Pain Tab, Momentum, P-A-C, Pain Reliever Tabs, Tri-Pain Caplets, Vanquish Caplet.  · Buffered Aspirin (generic):  Brand Names:  Ascriptin, Bufferin, Ecotrin, Buffaprin, Buffasal, Buffinol, COPE.  · BC Powders/Tablets  · Goodys Powders  · Stanback Powders or Tablets  · Excedrin, Excedrin Extra, Excedrin IB  · Teresita Dexter or Bromoseltzer  · Ibuprofen (generic):  Brand Names:  Advil, Nuprin, Motrin IB, Addapin, Genpril, Ibufen 200, Menadol, Midol IB, Dristan Sinus, Ursinus Inlay Tabs, Dimetapp Sinus, Valprin, Haltran Tabs, Isai's Pills, Jose.  · Aspirin Suppositories (generic, any strength)  · Naproxen (generic)  Brand Name: Aleve  · Pepto Bismol    PRESCRIPTION:  Brand Name Generic Name    Fiorinal  butalbital, aspirin, caffeine    Naprosyn, Anaprox  naproxen    Voltaren, Cataflam  diclofenac    Feldene  piroxicam    Motrin (Rx), Rufen  ibuprofen    Ansaid  flurbiprofen    Orudis  ketoprofen    Dolobid  diflunisol    Clinoril  sulindac    Indocin  indomethacin    Tolectin  tolmetin    Azdone Tabs  aspirin plus hydrocodone    Percodan  aspirin plus oxycodone    Synalgos  aspirin plus dihydrocodeine    Daypro  oxaprozin    Lodine  etodolac    Meclomen  meclofenamate    Nalfon  fenoprofen    Ponstel (mefenamic acid)     Relafen  nabumetone   Toradol ketorolac   Mobic  meloxicam     If you have a headache, backache, arthritis or other painful condition, please take Tylenol, Datril or some other non aspirin-containing product.   yes

## 2022-10-26 NOTE — ED PROVIDER NOTE - OBJECTIVE STATEMENT
47-year-old female with history of schizoaffective disorder brought in by EMS for evaluation of chest pain over the past few days.  Patient is known COVID-positive.  Patient is unable to provide a full HPI or review of systems due to her psychiatric illness and baseline mental status.  I attempted to gather HPI and review of systems from the family using a Mandarin  and they were able to tell me that the patient has been having the pain for about the last 5 days intermittently but unable to describe the pain.

## 2022-10-26 NOTE — ED ADULT NURSE REASSESSMENT NOTE - NS ED NURSE REASSESS COMMENT FT1
Called group home and spoke with staff Lucila. As per Lucila, okay for patient to be transported back to facility in cab.

## 2022-11-02 LAB — OLANZAPINE SERPL-MCNC: 69 NG/ML — SIGNIFICANT CHANGE UP (ref 10–80)

## 2022-11-10 ENCOUNTER — APPOINTMENT (OUTPATIENT)
Dept: CARDIOLOGY | Facility: CLINIC | Age: 47
End: 2022-11-10

## 2023-01-18 NOTE — BH INPATIENT PSYCHIATRY ASSESSMENT NOTE - NSBHMSELANG_PSY_A_CORE
Unable to assess

## 2023-03-27 NOTE — ED PROVIDER NOTE - DISCHARGE REVIEW MATERIAL PRESENTED
. Dutasteride Male Counseling: Dustasteride Counseling:  I discussed with the patient the risks of use of dutasteride including but not limited to decreased libido, decreased ejaculate volume, and gynecomastia. Women who can become pregnant should not handle medication.  All of the patient's questions and concerns were addressed. Dutasteride Counseling: Dustasteride Counseling:  I discussed with the patient the risks of use of dutasteride including but not limited to decreased libido, decreased ejaculate volume, and gynecomastia. Women who can become pregnant should not handle medication.  All of the patient's questions and concerns were addressed.

## 2023-03-29 NOTE — ED PROVIDER NOTE - DATE/TIME 2
Please contact your Oncologist if you have any questions regarding the chemotherapy teaching that you received today. 24-Jan-2020 23:50

## 2023-07-20 NOTE — ED ADULT NURSE NOTE - HIV OFFER
Opt out Topical Steroids Applications Pregnancy And Lactation Text: Most topical steroids are considered safe to use during pregnancy and lactation.  Any topical steroid applied to the breast or nipple should be washed off before breastfeeding.

## 2023-08-01 NOTE — BH DISCHARGE NOTE NURSING/SOCIAL WORK/PSYCH REHAB - NSDCINSTRUCTNUMBER_PSY_ALL_CORE
07/31/23 1710   Service Assessment   Patient Orientation Unable to Assess   History Provided By [facility]   Primary Caregiver Self   Support Systems Spouse/Significant Other   Patient's Healthcare Decision Maker is: Legal Next of [address] [facility]   PCP Verified by CM Yes   Last Visit to PCP Within last 6 months   Prior Functional Level Independent in ADLs/IADLs; Bathing;Dressing  (wife assist occassionally)   Can patient return to prior living arrangement Yes   Ability to make needs known: Good   Social/Functional History   Lives With Spouse   Type of [address] Two level   Home Access Stairs to enter with rails   Bathroom Equipment None   Bathroom Accessibility Accessible   ADL Assistance Needs assistance  (dependent on pain level)   Bath Minimal assistance   Dressing Contact guard assistance   Feeding Independent   Toileting Independent   Homemaking Responsibilities No   Ambulation Assistance Needs assistance  (occassionally with significant pain)   Active  Yes   Mode of Transportation Car   Occupation Retired   Discharge Planning   Type of [address] Prior To Admission None   DME Ordered? No   Potential Assistance Purchasing Medications No   One/Two Story Residence Two story   # of Interior Steps [address] [facility] [facility] Available No       Met with spouse at bedside- patient in procedure. Explained role of CM. Lives with wife at home- wife states would be able to stay on 1st floor if necessary on temporary basis. Has MPOA with wife and daughter named as decision makers-  wife thinks it is scanned in system from previous admossions- CM to validate. Demographics confirmed- added daughter Olga Long as secondary contact ([phone]). States has [address] and uses [address] . No issues with securing any meds.     Wife noted cardiac ablation at [address] several years ago and colon surgery at [facility]  [phone]

## 2023-08-28 NOTE — ED ADULT NURSE NOTE - SUICIDE SCREENING QUESTION 3
Vital Signs Last 24 Hrs  T(C): 36.6 (28 Aug 2023 12:47), Max: 36.8 (28 Aug 2023 06:23)  T(F): 97.9 (28 Aug 2023 12:47), Max: 98.2 (28 Aug 2023 06:23)  HR: 126 (28 Aug 2023 12:47) (65 - 126)  BP: 112/88 (28 Aug 2023 12:47) (112/88 - 149/89)  BP(mean): --  RR: 20 (28 Aug 2023 12:47) (18 - 20)  SpO2: 94% (28 Aug 2023 12:47) (94% - 97%)    Parameters below as of 28 Aug 2023 12:47  Patient On (Oxygen Delivery Method): room air    GENERAL: NAD, pleasantly confused, nontoxic appearing    HEENT:  Atraumatic, Normocephalic, Conjunctiva and sclera clear, oral mucosa moist, clear w/o any exudate   NECK: Supple, No JVD  CHEST/LUNG: Clear to auscultation bilaterally; No wheeze  HEART: Regular rate and rhythm; No murmurs, rubs, or gallops  ABDOMEN: Soft, Nontender, Nondistended; Bowel sounds present  EXTREMITIES:  2+ Peripheral Pulses, No clubbing, cyanosis, or edema  PSYCH: AAOx2  NEUROLOGY: non-focal, moving all extremities   SKIN: No rashes or lesions No

## 2023-12-07 NOTE — ED BEHAVIORAL HEALTH ASSESSMENT NOTE - BODY HABITUS
Initiate Treatment: Tac cream to aa bid prn flare Render In Strict Bullet Format?: No Detail Level: Zone Average build

## 2023-12-21 NOTE — ED ADULT NURSE NOTE - PAIN RATING/NUMBER SCALE (0-10): ACTIVITY
Procedure note: Trigger point injections  Procedure Diagnosis: Myofascial pain    Summary of Procedure:   Risks and benefits of the procedure were discussed with the patient and consent was obtained. Trigger points were palpated and marked along the bilateral thoracic paraspinals and lower trapezius, 6 sites in total. Using alcohol swabs, injection sites were cleaned in sterile fashion. Using a 27 gauge 1 1/2\" needle 20mg Kenolog diluted into 5.5cc of 1% Lidocaine was injected into the trigger points with 1 ml into each trigger point. There was negative aspiration of heme and no paresthesias were elicited. Patient tolerated the procedure well.    Beatrice Paredes DO  Physical Medicine and [address]

## 2024-01-10 NOTE — ED ADULT TRIAGE NOTE - NS ED NURSE BANDS TYPE
"  Assessment & Plan   Type 2 diabetes mellitus with hyperglycemia, without long-term current use of insulin (H)  Unfortunately his A1c has increased by over 1 percentage point since his last recheck.  He notes much worse choices for food especially over the holidays.  He has been struggling with limiting his portions as well.  We discussed ongoing management of his diabetes and made the informed decision to start a GLP-1 agonist.  Rx for Mounjaro titration.  Will also check all other routine diabetic labs and adjust therapy as needed. Fu in 3 months for recheck.   - Hemoglobin A1c; Future  - Comprehensive metabolic panel; Future  - Albumin Random Urine Quantitative with Creat Ratio; Future  - Lipid panel reflex to direct LDL Fasting; Future  - tirzepatide (MOUNJARO) 2.5 MG/0.5ML pen; Inject 2.5 mg Subcutaneous every 7 days for 28 days  - tirzepatide (MOUNJARO) 5 MG/0.5ML pen; Inject 5 mg Subcutaneous every 7 days for 28 days  - tirzepatide (MOUNJARO) 7.5 MG/0.5ML pen; Inject 7.5 mg Subcutaneous every 7 days    Hyperlipidemia LDL goal <70  Due for recheck and refill of Crestor.   - Lipid panel reflex to direct LDL Fasting; Future     BMI:   Estimated body mass index is 27.35 kg/m  as calculated from the following:    Height as of this encounter: 1.88 m (6' 2\").    Weight as of this encounter: 96.6 kg (213 lb).     REYNALDO Echavarria [facility]    Roopa Monroy is a 46 year old, presenting for the following health issues:  Diabetes        1/10/2024     7:29 AM   Additional Questions   Roomed by Susan BASS CMA       History of Present Illness       Diabetes:   He presents for follow up of diabetes.   He is checking home blood glucose with a continuous glucose monitor.   He checks blood glucose before and after meals.  Blood glucose is never over 200 and never under 70. He is aware of hypoglycemia symptoms including none.   He is concerned about other.    He is not experiencing " "numbness or burning in feet, excessive thirst, blurry vision, weight changes or redness, sores or blisters on feet. The patient has had a diabetic eye exam in the last 12 months. Eye exam performed on 1/2/2023. Location of last eye exam [place].            Review of Systems   See Hpi       Objective    /78 (BP Location: Right arm, Patient Position: Sitting, Cuff Size: Adult Large)   Pulse 80   Temp 98.6  F (37  C) (Tympanic)   Resp 18   Ht 1.88 m (6' 2\")   Wt 96.6 kg (213 lb)   SpO2 97%   BMI 27.35 kg/m    Body mass index is 27.35 kg/m .  Physical Exam   Constitutional: healthy, alert, and no distress  Head: Normocephalic. Atraumatic  Eyes: No conjunctival injection, sclera anicteric  Neck: supple, no thyromegaly, nodules or asymmetry of the thyroid. No cervical LAD.  Cardiovascular: RRR. No murmurs, clicks, gallops, or rubs. No peripheral edema.   Respiratory: No resp distress. Lungs CTAB bilaterally.   Musculoskeletal: extremities normal- no gross deformities noted, and normal muscle tone  Skin: no suspicious lesions or rashes  Neurologic: Gait normal. CN 2-12 grossly intact  Psychiatric: mentation appears normal and affect normal/bright     Results for orders placed or performed in visit on 01/10/24   Hemoglobin A1c     Status: Abnormal   Result Value Ref Range    Hemoglobin A1C 7.4 (H) 0.0 - 5.6 %   Extra Tube     Status: None    Narrative    The following orders were created for panel order Extra Tube.  Procedure                               Abnormality         Status                     ---------                               -----------         ------                     Extra Serum Separator Tu...[[id number]]                      Final result               Extra Purple Top Tube[[id number]]                            Final result                 Please view results for these tests on the individual orders.   Extra Serum Separator Tube (SST)     Status: None   Result Value Ref Range    Hold " Specimen JIC    Extra Purple Top Tube     Status: None   Result Value Ref Range    Hold Specimen JIC                    Name band;

## 2024-06-14 NOTE — ED ADULT TRIAGE NOTE - PAIN: PRESENCE, MLM
non-verbal indicators of pain/discomfort absent
Initiate Treatment: OTC Rogaine
Render In Strict Bullet Format?: No
Detail Level: Zone

## 2025-03-21 ENCOUNTER — EMERGENCY (EMERGENCY)
Facility: HOSPITAL | Age: 50
LOS: 1 days | Discharge: HOME | End: 2025-03-21
Attending: EMERGENCY MEDICINE | Admitting: EMERGENCY MEDICINE
Payer: MEDICAID

## 2025-03-21 VITALS
HEART RATE: 98 BPM | WEIGHT: 164.91 LBS | SYSTOLIC BLOOD PRESSURE: 146 MMHG | RESPIRATION RATE: 19 BRPM | DIASTOLIC BLOOD PRESSURE: 93 MMHG | HEIGHT: 60 IN | OXYGEN SATURATION: 97 % | TEMPERATURE: 97 F

## 2025-03-21 VITALS
WEIGHT: 134.92 LBS | OXYGEN SATURATION: 97 % | HEIGHT: 60 IN | HEART RATE: 98 BPM | DIASTOLIC BLOOD PRESSURE: 84 MMHG | RESPIRATION RATE: 19 BRPM | SYSTOLIC BLOOD PRESSURE: 135 MMHG | TEMPERATURE: 98 F

## 2025-03-21 DIAGNOSIS — Z01.89 ENCOUNTER FOR OTHER SPECIFIED SPECIAL EXAMINATIONS: ICD-10-CM

## 2025-03-21 PROCEDURE — 99283 EMERGENCY DEPT VISIT LOW MDM: CPT

## 2025-03-21 PROCEDURE — L9995: CPT

## 2025-03-21 NOTE — ED PROVIDER NOTE - CLINICAL SUMMARY MEDICAL DECISION MAKING FREE TEXT BOX
50-year-old female brought to the emergency department because she was wandering in the streets.  Patient lives in a group home.  She has no complaints.  Patient has no trauma.  Kaushik  utilized for interaction.  Calm/cooperative. SW will assist with discharge.

## 2025-03-21 NOTE — ED PROVIDER NOTE - CLINICAL SUMMARY MEDICAL DECISION MAKING FREE TEXT BOX
50-year-old female presents emergency department brought in by EMS for wandering in the streets.  Patient lives at a local group home.  Patient has no complaints.    Sera  Carmela [phone]      Exam as stated. Pt stable. SW contacted group home. PT will be discharged.

## 2025-03-21 NOTE — ED ADULT TRIAGE NOTE - CHIEF COMPLAINT QUOTE
presents  via EMS from street found wandering from [facility]. pt denies complaints

## 2025-03-21 NOTE — ED PROVIDER NOTE - PATIENT PORTAL LINK FT
You can access the FollowMyHealth Patient Portal offered by [facility] by registering at the following website: [email or web address]. By joining [employer]’s FollowMyHealth portal, you will also be able to view your health information using other applications (apps) compatible with our system.

## 2025-03-21 NOTE — ED PROVIDER NOTE - PATIENT PORTAL LINK FT
You can access the FollowMyHealth Patient Portal offered by [facility] by registering at the following website: [email or web address]. By joining [employer]’s FollowMyHealth portal, you will also be able to view your health information using other applications (apps) compatible with our system.

## 2025-03-21 NOTE — ED PROVIDER NOTE - OBJECTIVE STATEMENT
50-year-old female presents emergency department brought in by EMS for wandering in the streets.  Patient lives at a local group home.  Patient has no complaints.    Sera  Carmela [phone]

## 2025-03-21 NOTE — ED ADULT NURSE NOTE - CHIEF COMPLAINT QUOTE
brought in via EMS from street as per EMS " pt was found wandering the streets found on someone's front steps, she is from the [facility]" as per pt " I don't know why I am here today " pt speaks mandarin Scribe Attestation (For Scribes USE Only)... Scribe Attestation (For Scribes USE Only).../Attending Attestation (For Attendings USE Only)...

## 2025-03-21 NOTE — ED PROVIDER NOTE - OBJECTIVE STATEMENT
50-year-old female brought to the emergency department because she was wandering in the streets.  Patient lives in a group home.  She has no complaints.  Patient has no trauma.  Kaushik  utilized for interaction.

## 2025-03-21 NOTE — ED ADULT NURSE NOTE - OBJECTIVE STATEMENT
50-year-old female presents emergency department brought in by EMS for wandering in the streets.  Patient lives at a local group home.  Patient has no complaints.

## 2025-03-21 NOTE — ED ADULT TRIAGE NOTE - CHIEF COMPLAINT QUOTE
brought in via EMS from street as per EMS " pt was found wandering the streets found on someone's front steps, she is from the [facility]" as per pt " I don't know why I am here today " pt speaks mandarin

## 2025-03-21 NOTE — CHART NOTE - NSCHARTNOTEFT_GEN_A_CORE
Patient was returned to the ED by EMS for wandering the streets once again.  SW spoke with Cecil, Resident Counselor, [phone] who stated that he was on his way to pick her up from the ED.  He was not aware that she left the facility once more.

## 2025-03-21 NOTE — CHART NOTE - NSCHARTNOTEFT_GEN_A_CORE
50 y o female presenting to the ED for medical screening as per chart.  The patient was brought in by EMS.  She was found on someone's front steps as per chart.  SW called [facility] [phone] and spoke with Devang Narayanan, staff.  Mr. Narayanan was advise of the patient's discharge to the [facility] and was agreeable. 50 y o female presenting to the ED for medical screening as per chart.  The patient was brought in by EMS.  She was found on someone's front steps as per chart.  SW called [facility] [phone] and spoke with Devang Narayanan, staff.  Mr. Narayanan was advise of the patient's discharge to the [facility] and was agreeable. Discharge document faxed to [phone].  Patient transported by [employer].

## 2025-03-25 DIAGNOSIS — Z91.83 WANDERING IN DISEASES CLASSIFIED ELSEWHERE: ICD-10-CM

## 2025-08-20 ENCOUNTER — EMERGENCY (EMERGENCY)
Facility: HOSPITAL | Age: 50
LOS: 1 days | End: 2025-08-20
Attending: EMERGENCY MEDICINE | Admitting: EMERGENCY MEDICINE
Payer: MEDICAID

## 2025-08-20 VITALS
TEMPERATURE: 99 F | HEART RATE: 97 BPM | RESPIRATION RATE: 18 BRPM | DIASTOLIC BLOOD PRESSURE: 72 MMHG | HEIGHT: 59 IN | WEIGHT: 134.92 LBS | SYSTOLIC BLOOD PRESSURE: 112 MMHG | OXYGEN SATURATION: 97 %

## 2025-08-20 PROCEDURE — 99282 EMERGENCY DEPT VISIT SF MDM: CPT

## 2025-08-20 PROCEDURE — 99284 EMERGENCY DEPT VISIT MOD MDM: CPT
